# Patient Record
Sex: FEMALE | Race: BLACK OR AFRICAN AMERICAN | Employment: UNEMPLOYED | URBAN - METROPOLITAN AREA
[De-identification: names, ages, dates, MRNs, and addresses within clinical notes are randomized per-mention and may not be internally consistent; named-entity substitution may affect disease eponyms.]

---

## 2018-04-25 ENCOUNTER — APPOINTMENT (OUTPATIENT)
Dept: GENERAL RADIOLOGY | Age: 63
End: 2018-04-25
Payer: MEDICARE

## 2018-04-25 ENCOUNTER — HOSPITAL ENCOUNTER (EMERGENCY)
Age: 63
Discharge: HOME | End: 2018-04-25
Attending: EMERGENCY MEDICINE
Payer: MEDICARE

## 2018-04-25 ENCOUNTER — APPOINTMENT (OUTPATIENT)
Dept: CT IMAGING | Age: 63
End: 2018-04-25
Payer: MEDICARE

## 2018-04-25 VITALS
OXYGEN SATURATION: 98 % | TEMPERATURE: 97.8 F | WEIGHT: 110 LBS | HEART RATE: 90 BPM | HEIGHT: 64 IN | RESPIRATION RATE: 18 BRPM | BODY MASS INDEX: 18.78 KG/M2

## 2018-04-25 DIAGNOSIS — R51.9 NONINTRACTABLE HEADACHE, UNSPECIFIED CHRONICITY PATTERN, UNSPECIFIED HEADACHE TYPE: Primary | ICD-10-CM

## 2018-04-25 LAB
ABSOLUTE EOS #: 0.2 K/UL (ref 0–0.4)
ABSOLUTE IMMATURE GRANULOCYTE: ABNORMAL K/UL (ref 0–0.3)
ABSOLUTE LYMPH #: 1.6 K/UL (ref 1–4.8)
ABSOLUTE MONO #: 0.5 K/UL (ref 0.2–0.8)
ANION GAP SERPL CALCULATED.3IONS-SCNC: 14 MMOL/L (ref 9–17)
BASOPHILS # BLD: 0 % (ref 0–2)
BASOPHILS ABSOLUTE: 0 K/UL (ref 0–0.2)
BUN BLDV-MCNC: 23 MG/DL (ref 8–23)
BUN/CREAT BLD: 31 (ref 9–20)
CALCIUM SERPL-MCNC: 9.7 MG/DL (ref 8.6–10.4)
CHLORIDE BLD-SCNC: 98 MMOL/L (ref 98–107)
CO2: 26 MMOL/L (ref 20–31)
CREAT SERPL-MCNC: 0.75 MG/DL (ref 0.5–0.9)
DIFFERENTIAL TYPE: ABNORMAL
EKG ATRIAL RATE: 75 BPM
EKG P AXIS: 9 DEGREES
EKG P-R INTERVAL: 150 MS
EKG Q-T INTERVAL: 386 MS
EKG QRS DURATION: 84 MS
EKG QTC CALCULATION (BAZETT): 431 MS
EKG R AXIS: -42 DEGREES
EKG T AXIS: 28 DEGREES
EKG VENTRICULAR RATE: 75 BPM
EOSINOPHILS RELATIVE PERCENT: 3 % (ref 1–4)
GFR AFRICAN AMERICAN: >60 ML/MIN
GFR NON-AFRICAN AMERICAN: >60 ML/MIN
GFR SERPL CREATININE-BSD FRML MDRD: ABNORMAL ML/MIN/{1.73_M2}
GFR SERPL CREATININE-BSD FRML MDRD: ABNORMAL ML/MIN/{1.73_M2}
GLUCOSE BLD-MCNC: 188 MG/DL (ref 70–99)
HCT VFR BLD CALC: 39.7 % (ref 36–46)
HEMOGLOBIN: 13.3 G/DL (ref 12–16)
IMMATURE GRANULOCYTES: ABNORMAL %
INR BLD: 0.9
LYMPHOCYTES # BLD: 25 % (ref 24–44)
MCH RBC QN AUTO: 32.4 PG (ref 26–34)
MCHC RBC AUTO-ENTMCNC: 33.6 G/DL (ref 31–37)
MCV RBC AUTO: 96.4 FL (ref 80–100)
MONOCYTES # BLD: 8 % (ref 1–7)
MYOGLOBIN: <21 NG/ML (ref 25–58)
NRBC AUTOMATED: ABNORMAL PER 100 WBC
PARTIAL THROMBOPLASTIN TIME: 23.7 SEC (ref 23–31)
PDW BLD-RTO: 13.4 % (ref 11.5–14.5)
PLATELET # BLD: 247 K/UL (ref 130–400)
PLATELET ESTIMATE: ABNORMAL
PMV BLD AUTO: 8.6 FL (ref 6–12)
POTASSIUM SERPL-SCNC: 4 MMOL/L (ref 3.7–5.3)
PROTHROMBIN TIME: 9 SEC (ref 9.7–11.6)
RBC # BLD: 4.11 M/UL (ref 4–5.2)
RBC # BLD: ABNORMAL 10*6/UL
SEG NEUTROPHILS: 64 % (ref 36–66)
SEGMENTED NEUTROPHILS ABSOLUTE COUNT: 3.9 K/UL (ref 1.8–7.7)
SODIUM BLD-SCNC: 138 MMOL/L (ref 135–144)
TROPONIN INTERP: ABNORMAL
TROPONIN T: <0.03 NG/ML
WBC # BLD: 6.2 K/UL (ref 3.5–11)
WBC # BLD: ABNORMAL 10*3/UL

## 2018-04-25 PROCEDURE — 85730 THROMBOPLASTIN TIME PARTIAL: CPT

## 2018-04-25 PROCEDURE — 93005 ELECTROCARDIOGRAM TRACING: CPT

## 2018-04-25 PROCEDURE — 96374 THER/PROPH/DIAG INJ IV PUSH: CPT

## 2018-04-25 PROCEDURE — 84484 ASSAY OF TROPONIN QUANT: CPT

## 2018-04-25 PROCEDURE — 99284 EMERGENCY DEPT VISIT MOD MDM: CPT

## 2018-04-25 PROCEDURE — 70450 CT HEAD/BRAIN W/O DYE: CPT

## 2018-04-25 PROCEDURE — 83874 ASSAY OF MYOGLOBIN: CPT

## 2018-04-25 PROCEDURE — 6360000002 HC RX W HCPCS: Performed by: NURSE PRACTITIONER

## 2018-04-25 PROCEDURE — 80048 BASIC METABOLIC PNL TOTAL CA: CPT

## 2018-04-25 PROCEDURE — 85025 COMPLETE CBC W/AUTO DIFF WBC: CPT

## 2018-04-25 PROCEDURE — 85610 PROTHROMBIN TIME: CPT

## 2018-04-25 PROCEDURE — 71045 X-RAY EXAM CHEST 1 VIEW: CPT

## 2018-04-25 RX ORDER — BUTALBITAL, ACETAMINOPHEN AND CAFFEINE 300; 40; 50 MG/1; MG/1; MG/1
1 CAPSULE ORAL EVERY 4 HOURS PRN
Qty: 20 CAPSULE | Refills: 0 | Status: SHIPPED | OUTPATIENT
Start: 2018-04-25 | End: 2021-06-29

## 2018-04-25 RX ORDER — LISINOPRIL AND HYDROCHLOROTHIAZIDE 20; 12.5 MG/1; MG/1
1 TABLET ORAL DAILY
COMMUNITY

## 2018-04-25 RX ADMIN — Medication 1 MG: at 16:02

## 2018-04-25 ASSESSMENT — ENCOUNTER SYMPTOMS
VOMITING: 0
SHORTNESS OF BREATH: 0
NAUSEA: 0
PHOTOPHOBIA: 0
DIARRHEA: 0
ABDOMINAL PAIN: 0
COUGH: 0
BACK PAIN: 0

## 2018-04-25 ASSESSMENT — PAIN DESCRIPTION - LOCATION
LOCATION: HEAD
LOCATION: HEAD

## 2018-04-25 ASSESSMENT — PAIN SCALES - GENERAL
PAINLEVEL_OUTOF10: 6
PAINLEVEL_OUTOF10: 3
PAINLEVEL_OUTOF10: 6

## 2018-04-25 ASSESSMENT — PAIN DESCRIPTION - PAIN TYPE: TYPE: ACUTE PAIN

## 2018-04-25 ASSESSMENT — PAIN DESCRIPTION - DESCRIPTORS: DESCRIPTORS: POUNDING;PRESSURE

## 2020-09-16 ENCOUNTER — HOSPITAL ENCOUNTER (INPATIENT)
Age: 65
LOS: 7 days | Discharge: HOME | DRG: 065 | End: 2020-09-23
Attending: EMERGENCY MEDICINE | Admitting: PSYCHIATRY & NEUROLOGY
Payer: COMMERCIAL

## 2020-09-16 ENCOUNTER — APPOINTMENT (OUTPATIENT)
Dept: GENERAL RADIOLOGY | Age: 65
DRG: 065 | End: 2020-09-16
Payer: COMMERCIAL

## 2020-09-16 ENCOUNTER — APPOINTMENT (OUTPATIENT)
Dept: CT IMAGING | Age: 65
DRG: 065 | End: 2020-09-16
Payer: COMMERCIAL

## 2020-09-16 PROBLEM — I63.9 STROKE DETERMINED BY CLINICAL ASSESSMENT (HCC): Status: ACTIVE | Noted: 2020-09-16

## 2020-09-16 LAB
% CKMB: 3.7 % (ref 0–3)
-: NORMAL
ABSOLUTE EOS #: 0.18 K/UL (ref 0–0.44)
ABSOLUTE IMMATURE GRANULOCYTE: 0.07 K/UL (ref 0–0.3)
ABSOLUTE LYMPH #: 2.16 K/UL (ref 1.1–3.7)
ABSOLUTE MONO #: 0.54 K/UL (ref 0.1–1.2)
ANION GAP SERPL CALCULATED.3IONS-SCNC: 14 MMOL/L (ref 9–17)
BASOPHILS # BLD: 0 % (ref 0–2)
BASOPHILS ABSOLUTE: 0.03 K/UL (ref 0–0.2)
BUN BLDV-MCNC: 21 MG/DL (ref 8–23)
BUN/CREAT BLD: ABNORMAL (ref 9–20)
CALCIUM SERPL-MCNC: 9.5 MG/DL (ref 8.6–10.4)
CHLORIDE BLD-SCNC: 97 MMOL/L (ref 98–107)
CK MB: 2.3 NG/ML
CKMB INTERPRETATION: ABNORMAL
CO2: 22 MMOL/L (ref 20–31)
CREAT SERPL-MCNC: 0.75 MG/DL (ref 0.5–0.9)
DIFFERENTIAL TYPE: ABNORMAL
EOSINOPHILS RELATIVE PERCENT: 2 % (ref 1–4)
GFR AFRICAN AMERICAN: >60 ML/MIN
GFR NON-AFRICAN AMERICAN: >60 ML/MIN
GFR SERPL CREATININE-BSD FRML MDRD: ABNORMAL ML/MIN/{1.73_M2}
GFR SERPL CREATININE-BSD FRML MDRD: ABNORMAL ML/MIN/{1.73_M2}
GLUCOSE BLD-MCNC: 431 MG/DL (ref 70–99)
HCT VFR BLD CALC: 41.5 % (ref 36.3–47.1)
HEMOGLOBIN: 13.8 G/DL (ref 11.9–15.1)
IMMATURE GRANULOCYTES: 1 %
INR BLD: 0.9
INR BLD: ABNORMAL
LYMPHOCYTES # BLD: 25 % (ref 24–43)
MCH RBC QN AUTO: 31 PG (ref 25.2–33.5)
MCHC RBC AUTO-ENTMCNC: 33.3 G/DL (ref 28.4–34.8)
MCV RBC AUTO: 93.3 FL (ref 82.6–102.9)
MONOCYTES # BLD: 6 % (ref 3–12)
MYOGLOBIN: <21 NG/ML (ref 25–58)
NRBC AUTOMATED: 0 PER 100 WBC
PARTIAL THROMBOPLASTIN TIME: 19.7 SEC (ref 20.5–30.5)
PARTIAL THROMBOPLASTIN TIME: ABNORMAL SEC
PDW BLD-RTO: 12 % (ref 11.8–14.4)
PLATELET # BLD: ABNORMAL K/UL (ref 138–453)
PLATELET ESTIMATE: ABNORMAL
PLATELET, FLUORESCENCE: NORMAL K/UL (ref 138–453)
PLATELET, IMMATURE FRACTION: NORMAL % (ref 1.1–10.3)
PMV BLD AUTO: ABNORMAL FL (ref 8.1–13.5)
POTASSIUM SERPL-SCNC: 4.1 MMOL/L (ref 3.7–5.3)
PROTHROMBIN TIME: 9.4 SEC (ref 9–12)
PROTHROMBIN TIME: ABNORMAL SEC
RBC # BLD: 4.45 M/UL (ref 3.95–5.11)
RBC # BLD: ABNORMAL 10*6/UL
REASON FOR REJECTION: NORMAL
SEG NEUTROPHILS: 66 % (ref 36–65)
SEGMENTED NEUTROPHILS ABSOLUTE COUNT: 5.73 K/UL (ref 1.5–8.1)
SODIUM BLD-SCNC: 133 MMOL/L (ref 135–144)
TOTAL CK: 63 U/L (ref 26–192)
TROPONIN INTERP: ABNORMAL
TROPONIN T: ABNORMAL NG/ML
TROPONIN, HIGH SENSITIVITY: 6 NG/L (ref 0–14)
WBC # BLD: 8.7 K/UL (ref 3.5–11.3)
WBC # BLD: ABNORMAL 10*3/UL
ZZ NTE CLEAN UP: ORDERED TEST: NORMAL
ZZ NTE WITH NAME CLEAN UP: SPECIMEN SOURCE: NORMAL

## 2020-09-16 PROCEDURE — 80048 BASIC METABOLIC PNL TOTAL CA: CPT

## 2020-09-16 PROCEDURE — 2060000000 HC ICU INTERMEDIATE R&B

## 2020-09-16 PROCEDURE — 83874 ASSAY OF MYOGLOBIN: CPT

## 2020-09-16 PROCEDURE — 70496 CT ANGIOGRAPHY HEAD: CPT

## 2020-09-16 PROCEDURE — 85730 THROMBOPLASTIN TIME PARTIAL: CPT

## 2020-09-16 PROCEDURE — 82435 ASSAY OF BLOOD CHLORIDE: CPT

## 2020-09-16 PROCEDURE — 82553 CREATINE MB FRACTION: CPT

## 2020-09-16 PROCEDURE — 6370000000 HC RX 637 (ALT 250 FOR IP): Performed by: STUDENT IN AN ORGANIZED HEALTH CARE EDUCATION/TRAINING PROGRAM

## 2020-09-16 PROCEDURE — 82803 BLOOD GASES ANY COMBINATION: CPT

## 2020-09-16 PROCEDURE — 85055 RETICULATED PLATELET ASSAY: CPT

## 2020-09-16 PROCEDURE — 84295 ASSAY OF SERUM SODIUM: CPT

## 2020-09-16 PROCEDURE — 82330 ASSAY OF CALCIUM: CPT

## 2020-09-16 PROCEDURE — 82550 ASSAY OF CK (CPK): CPT

## 2020-09-16 PROCEDURE — 84132 ASSAY OF SERUM POTASSIUM: CPT

## 2020-09-16 PROCEDURE — 2580000003 HC RX 258: Performed by: STUDENT IN AN ORGANIZED HEALTH CARE EDUCATION/TRAINING PROGRAM

## 2020-09-16 PROCEDURE — 85014 HEMATOCRIT: CPT

## 2020-09-16 PROCEDURE — 70450 CT HEAD/BRAIN W/O DYE: CPT

## 2020-09-16 PROCEDURE — 84484 ASSAY OF TROPONIN QUANT: CPT

## 2020-09-16 PROCEDURE — 93005 ELECTROCARDIOGRAM TRACING: CPT | Performed by: STUDENT IN AN ORGANIZED HEALTH CARE EDUCATION/TRAINING PROGRAM

## 2020-09-16 PROCEDURE — 82565 ASSAY OF CREATININE: CPT

## 2020-09-16 PROCEDURE — 71045 X-RAY EXAM CHEST 1 VIEW: CPT

## 2020-09-16 PROCEDURE — 6360000004 HC RX CONTRAST MEDICATION: Performed by: STUDENT IN AN ORGANIZED HEALTH CARE EDUCATION/TRAINING PROGRAM

## 2020-09-16 PROCEDURE — 99222 1ST HOSP IP/OBS MODERATE 55: CPT | Performed by: PSYCHIATRY & NEUROLOGY

## 2020-09-16 PROCEDURE — 85610 PROTHROMBIN TIME: CPT

## 2020-09-16 PROCEDURE — 6360000002 HC RX W HCPCS: Performed by: STUDENT IN AN ORGANIZED HEALTH CARE EDUCATION/TRAINING PROGRAM

## 2020-09-16 PROCEDURE — 85027 COMPLETE CBC AUTOMATED: CPT

## 2020-09-16 PROCEDURE — 85025 COMPLETE CBC W/AUTO DIFF WBC: CPT

## 2020-09-16 PROCEDURE — 99285 EMERGENCY DEPT VISIT HI MDM: CPT

## 2020-09-16 PROCEDURE — 83605 ASSAY OF LACTIC ACID: CPT

## 2020-09-16 RX ORDER — ACETAMINOPHEN 500 MG
1000 TABLET ORAL ONCE
Status: COMPLETED | OUTPATIENT
Start: 2020-09-16 | End: 2020-09-16

## 2020-09-16 RX ORDER — HEPARIN SODIUM 1000 [USP'U]/ML
60 INJECTION, SOLUTION INTRAVENOUS; SUBCUTANEOUS PRN
Status: DISCONTINUED | OUTPATIENT
Start: 2020-09-16 | End: 2020-09-17

## 2020-09-16 RX ORDER — 0.9 % SODIUM CHLORIDE 0.9 %
1000 INTRAVENOUS SOLUTION INTRAVENOUS ONCE
Status: COMPLETED | OUTPATIENT
Start: 2020-09-16 | End: 2020-09-16

## 2020-09-16 RX ORDER — HEPARIN SODIUM 1000 [USP'U]/ML
60 INJECTION, SOLUTION INTRAVENOUS; SUBCUTANEOUS ONCE
Status: COMPLETED | OUTPATIENT
Start: 2020-09-16 | End: 2020-09-16

## 2020-09-16 RX ORDER — HEPARIN SODIUM 1000 [USP'U]/ML
30 INJECTION, SOLUTION INTRAVENOUS; SUBCUTANEOUS PRN
Status: DISCONTINUED | OUTPATIENT
Start: 2020-09-16 | End: 2020-09-17

## 2020-09-16 RX ORDER — CLOPIDOGREL 300 MG/1
300 TABLET, FILM COATED ORAL ONCE
Status: COMPLETED | OUTPATIENT
Start: 2020-09-16 | End: 2020-09-16

## 2020-09-16 RX ORDER — HEPARIN SODIUM 10000 [USP'U]/100ML
12 INJECTION, SOLUTION INTRAVENOUS CONTINUOUS
Status: DISCONTINUED | OUTPATIENT
Start: 2020-09-16 | End: 2020-09-23

## 2020-09-16 RX ADMIN — IOHEXOL 90 ML: 350 INJECTION, SOLUTION INTRAVENOUS at 21:22

## 2020-09-16 RX ADMIN — HEPARIN SODIUM 2990 UNITS: 1000 INJECTION INTRAVENOUS; SUBCUTANEOUS at 23:23

## 2020-09-16 RX ADMIN — ACETAMINOPHEN 1000 MG: 500 TABLET ORAL at 20:43

## 2020-09-16 RX ADMIN — SODIUM CHLORIDE 1000 ML: 9 INJECTION, SOLUTION INTRAVENOUS at 21:20

## 2020-09-16 RX ADMIN — CLOPIDOGREL BISULFATE 300 MG: 300 TABLET, FILM COATED ORAL at 23:22

## 2020-09-16 RX ADMIN — HEPARIN SODIUM AND DEXTROSE 12 UNITS/KG/HR: 10000; 5 INJECTION INTRAVENOUS at 23:23

## 2020-09-16 ASSESSMENT — PAIN DESCRIPTION - ORIENTATION: ORIENTATION: LEFT

## 2020-09-16 ASSESSMENT — PAIN SCALES - GENERAL
PAINLEVEL_OUTOF10: 5
PAINLEVEL_OUTOF10: 5

## 2020-09-16 ASSESSMENT — PAIN DESCRIPTION - LOCATION: LOCATION: SHOULDER

## 2020-09-17 ENCOUNTER — APPOINTMENT (OUTPATIENT)
Dept: MRI IMAGING | Age: 65
DRG: 065 | End: 2020-09-17
Payer: COMMERCIAL

## 2020-09-17 LAB
ALLEN TEST: ABNORMAL
ANION GAP SERPL CALCULATED.3IONS-SCNC: 12 MMOL/L (ref 9–17)
ANION GAP: 8 MMOL/L (ref 7–16)
BUN BLDV-MCNC: 11 MG/DL (ref 8–23)
BUN/CREAT BLD: ABNORMAL (ref 9–20)
CALCIUM SERPL-MCNC: 8.3 MG/DL (ref 8.6–10.4)
CHLORIDE BLD-SCNC: 108 MMOL/L (ref 98–107)
CHOLESTEROL/HDL RATIO: 2.3
CHOLESTEROL: 162 MG/DL
CO2: 19 MMOL/L (ref 20–31)
CREAT SERPL-MCNC: 0.46 MG/DL (ref 0.5–0.9)
ESTIMATED AVERAGE GLUCOSE: 303 MG/DL
FIO2: ABNORMAL
GFR AFRICAN AMERICAN: >60 ML/MIN
GFR NON-AFRICAN AMERICAN: >60 ML/MIN
GFR NON-AFRICAN AMERICAN: >60 ML/MIN
GFR SERPL CREATININE-BSD FRML MDRD: >60 ML/MIN
GFR SERPL CREATININE-BSD FRML MDRD: ABNORMAL ML/MIN/{1.73_M2}
GFR SERPL CREATININE-BSD FRML MDRD: ABNORMAL ML/MIN/{1.73_M2}
GFR SERPL CREATININE-BSD FRML MDRD: NORMAL ML/MIN/{1.73_M2}
GLUCOSE BLD-MCNC: 165 MG/DL (ref 70–99)
GLUCOSE BLD-MCNC: 171 MG/DL (ref 65–105)
GLUCOSE BLD-MCNC: 178 MG/DL (ref 65–105)
GLUCOSE BLD-MCNC: 192 MG/DL (ref 65–105)
GLUCOSE BLD-MCNC: 199 MG/DL (ref 65–105)
GLUCOSE BLD-MCNC: 464 MG/DL (ref 74–100)
HBA1C MFR BLD: 12.2 % (ref 4–6)
HCO3 VENOUS: 25.7 MMOL/L (ref 22–29)
HCT VFR BLD CALC: 37.1 % (ref 36.3–47.1)
HCT VFR BLD CALC: 39.4 % (ref 36.3–47.1)
HDLC SERPL-MCNC: 69 MG/DL
HEMOGLOBIN: 12.1 G/DL (ref 11.9–15.1)
HEMOGLOBIN: 12.9 G/DL (ref 11.9–15.1)
LDL CHOLESTEROL: 76 MG/DL (ref 0–130)
LV EF: 50 %
LVEF MODALITY: NORMAL
MCH RBC QN AUTO: 31.5 PG (ref 25.2–33.5)
MCH RBC QN AUTO: 31.8 PG (ref 25.2–33.5)
MCHC RBC AUTO-ENTMCNC: 32.6 G/DL (ref 28.4–34.8)
MCHC RBC AUTO-ENTMCNC: 32.7 G/DL (ref 28.4–34.8)
MCV RBC AUTO: 96.1 FL (ref 82.6–102.9)
MCV RBC AUTO: 97.6 FL (ref 82.6–102.9)
MODE: ABNORMAL
NEGATIVE BASE EXCESS, VEN: ABNORMAL (ref 0–2)
NRBC AUTOMATED: 0 PER 100 WBC
NRBC AUTOMATED: 0 PER 100 WBC
O2 DEVICE/FLOW/%: ABNORMAL
O2 SAT, VEN: 91 % (ref 60–85)
PARTIAL THROMBOPLASTIN TIME: 23.5 SEC (ref 20.5–30.5)
PARTIAL THROMBOPLASTIN TIME: 30 SEC (ref 20.5–30.5)
PARTIAL THROMBOPLASTIN TIME: 36.5 SEC (ref 20.5–30.5)
PARTIAL THROMBOPLASTIN TIME: 47.9 SEC (ref 20.5–30.5)
PATIENT TEMP: ABNORMAL
PCO2, VEN: 41.5 MM HG (ref 41–51)
PDW BLD-RTO: 12.1 % (ref 11.8–14.4)
PDW BLD-RTO: 12.1 % (ref 11.8–14.4)
PH VENOUS: 7.4 (ref 7.32–7.43)
PLATELET # BLD: 271 K/UL (ref 138–453)
PLATELET # BLD: 286 K/UL (ref 138–453)
PMV BLD AUTO: 10.5 FL (ref 8.1–13.5)
PMV BLD AUTO: 9.9 FL (ref 8.1–13.5)
PO2, VEN: 62.1 MM HG (ref 30–50)
POC CHLORIDE: 99 MMOL/L (ref 98–107)
POC CREATININE: 0.58 MG/DL (ref 0.51–1.19)
POC HEMATOCRIT: 44 % (ref 36–46)
POC HEMOGLOBIN: 15.1 G/DL (ref 12–16)
POC IONIZED CALCIUM: 1.18 MMOL/L (ref 1.15–1.33)
POC LACTIC ACID: 1.15 MMOL/L (ref 0.56–1.39)
POC PCO2 TEMP: ABNORMAL MM HG
POC PH TEMP: ABNORMAL
POC PO2 TEMP: ABNORMAL MM HG
POC POTASSIUM: 5.5 MMOL/L (ref 3.5–4.5)
POC SODIUM: 133 MMOL/L (ref 138–146)
POSITIVE BASE EXCESS, VEN: 1 (ref 0–3)
POTASSIUM SERPL-SCNC: 3.7 MMOL/L (ref 3.7–5.3)
RBC # BLD: 3.8 M/UL (ref 3.95–5.11)
RBC # BLD: 4.1 M/UL (ref 3.95–5.11)
SAMPLE SITE: ABNORMAL
SARS-COV-2, RAPID: NORMAL
SARS-COV-2: NORMAL
SARS-COV-2: NOT DETECTED
SODIUM BLD-SCNC: 139 MMOL/L (ref 135–144)
SOURCE: NORMAL
TOTAL CO2, VENOUS: 27 MMOL/L (ref 23–30)
TRIGL SERPL-MCNC: 85 MG/DL
VLDLC SERPL CALC-MCNC: NORMAL MG/DL (ref 1–30)
WBC # BLD: 8.2 K/UL (ref 3.5–11.3)
WBC # BLD: 9 K/UL (ref 3.5–11.3)

## 2020-09-17 PROCEDURE — 6370000000 HC RX 637 (ALT 250 FOR IP): Performed by: NURSE PRACTITIONER

## 2020-09-17 PROCEDURE — 36415 COLL VENOUS BLD VENIPUNCTURE: CPT

## 2020-09-17 PROCEDURE — 92523 SPEECH SOUND LANG COMPREHEN: CPT

## 2020-09-17 PROCEDURE — 2000000003 HC NEURO ICU R&B

## 2020-09-17 PROCEDURE — 93306 TTE W/DOPPLER COMPLETE: CPT

## 2020-09-17 PROCEDURE — 80048 BASIC METABOLIC PNL TOTAL CA: CPT

## 2020-09-17 PROCEDURE — 97535 SELF CARE MNGMENT TRAINING: CPT

## 2020-09-17 PROCEDURE — 99233 SBSQ HOSP IP/OBS HIGH 50: CPT | Performed by: PSYCHIATRY & NEUROLOGY

## 2020-09-17 PROCEDURE — 85730 THROMBOPLASTIN TIME PARTIAL: CPT

## 2020-09-17 PROCEDURE — 80061 LIPID PANEL: CPT

## 2020-09-17 PROCEDURE — 2580000003 HC RX 258: Performed by: STUDENT IN AN ORGANIZED HEALTH CARE EDUCATION/TRAINING PROGRAM

## 2020-09-17 PROCEDURE — U0003 INFECTIOUS AGENT DETECTION BY NUCLEIC ACID (DNA OR RNA); SEVERE ACUTE RESPIRATORY SYNDROME CORONAVIRUS 2 (SARS-COV-2) (CORONAVIRUS DISEASE [COVID-19]), AMPLIFIED PROBE TECHNIQUE, MAKING USE OF HIGH THROUGHPUT TECHNOLOGIES AS DESCRIBED BY CMS-2020-01-R: HCPCS

## 2020-09-17 PROCEDURE — 6370000000 HC RX 637 (ALT 250 FOR IP): Performed by: STUDENT IN AN ORGANIZED HEALTH CARE EDUCATION/TRAINING PROGRAM

## 2020-09-17 PROCEDURE — 97166 OT EVAL MOD COMPLEX 45 MIN: CPT

## 2020-09-17 PROCEDURE — APPNB45 APP NON BILLABLE 31-45 MINUTES: Performed by: NURSE PRACTITIONER

## 2020-09-17 PROCEDURE — 85027 COMPLETE CBC AUTOMATED: CPT

## 2020-09-17 PROCEDURE — 70551 MRI BRAIN STEM W/O DYE: CPT

## 2020-09-17 PROCEDURE — 83036 HEMOGLOBIN GLYCOSYLATED A1C: CPT

## 2020-09-17 PROCEDURE — 82947 ASSAY GLUCOSE BLOOD QUANT: CPT

## 2020-09-17 RX ORDER — ASPIRIN 81 MG/1
81 TABLET ORAL DAILY
Status: DISCONTINUED | OUTPATIENT
Start: 2020-09-17 | End: 2020-09-23 | Stop reason: HOSPADM

## 2020-09-17 RX ORDER — BUTALBITAL, ACETAMINOPHEN AND CAFFEINE 50; 325; 40 MG/1; MG/1; MG/1
1 TABLET ORAL EVERY 4 HOURS PRN
Status: DISCONTINUED | OUTPATIENT
Start: 2020-09-17 | End: 2020-09-23 | Stop reason: HOSPADM

## 2020-09-17 RX ORDER — ATORVASTATIN CALCIUM 80 MG/1
80 TABLET, FILM COATED ORAL NIGHTLY
Status: DISCONTINUED | OUTPATIENT
Start: 2020-09-17 | End: 2020-09-23 | Stop reason: HOSPADM

## 2020-09-17 RX ORDER — SODIUM CHLORIDE 0.9 % (FLUSH) 0.9 %
10 SYRINGE (ML) INJECTION EVERY 12 HOURS SCHEDULED
Status: DISCONTINUED | OUTPATIENT
Start: 2020-09-17 | End: 2020-09-23 | Stop reason: HOSPADM

## 2020-09-17 RX ORDER — NICOTINE POLACRILEX 4 MG
15 LOZENGE BUCCAL PRN
Status: DISCONTINUED | OUTPATIENT
Start: 2020-09-17 | End: 2020-09-23 | Stop reason: HOSPADM

## 2020-09-17 RX ORDER — SODIUM CHLORIDE 0.9 % (FLUSH) 0.9 %
10 SYRINGE (ML) INJECTION PRN
Status: DISCONTINUED | OUTPATIENT
Start: 2020-09-17 | End: 2020-09-23 | Stop reason: HOSPADM

## 2020-09-17 RX ORDER — LISINOPRIL AND HYDROCHLOROTHIAZIDE 20; 12.5 MG/1; MG/1
1 TABLET ORAL DAILY
Status: DISCONTINUED | OUTPATIENT
Start: 2020-09-17 | End: 2020-09-23 | Stop reason: HOSPADM

## 2020-09-17 RX ORDER — POTASSIUM CHLORIDE 7.45 MG/ML
10 INJECTION INTRAVENOUS PRN
Status: DISCONTINUED | OUTPATIENT
Start: 2020-09-17 | End: 2020-09-23 | Stop reason: HOSPADM

## 2020-09-17 RX ORDER — PROMETHAZINE HYDROCHLORIDE 12.5 MG/1
12.5 TABLET ORAL EVERY 6 HOURS PRN
Status: DISCONTINUED | OUTPATIENT
Start: 2020-09-17 | End: 2020-09-23 | Stop reason: HOSPADM

## 2020-09-17 RX ORDER — DEXTROSE MONOHYDRATE 50 MG/ML
100 INJECTION, SOLUTION INTRAVENOUS PRN
Status: DISCONTINUED | OUTPATIENT
Start: 2020-09-17 | End: 2020-09-23 | Stop reason: HOSPADM

## 2020-09-17 RX ORDER — ONDANSETRON 2 MG/ML
4 INJECTION INTRAMUSCULAR; INTRAVENOUS EVERY 6 HOURS PRN
Status: DISCONTINUED | OUTPATIENT
Start: 2020-09-17 | End: 2020-09-23 | Stop reason: HOSPADM

## 2020-09-17 RX ORDER — SODIUM CHLORIDE 9 MG/ML
INJECTION, SOLUTION INTRAVENOUS CONTINUOUS
Status: DISCONTINUED | OUTPATIENT
Start: 2020-09-17 | End: 2020-09-20

## 2020-09-17 RX ORDER — METOPROLOL TARTRATE 50 MG/1
50 TABLET, FILM COATED ORAL DAILY
Status: DISCONTINUED | OUTPATIENT
Start: 2020-09-17 | End: 2020-09-23 | Stop reason: HOSPADM

## 2020-09-17 RX ORDER — M-VIT,TX,IRON,MINS/CALC/FOLIC 27MG-0.4MG
1 TABLET ORAL DAILY
Status: DISCONTINUED | OUTPATIENT
Start: 2020-09-17 | End: 2020-09-23 | Stop reason: HOSPADM

## 2020-09-17 RX ORDER — POLYETHYLENE GLYCOL 3350 17 G/17G
17 POWDER, FOR SOLUTION ORAL DAILY PRN
Status: DISCONTINUED | OUTPATIENT
Start: 2020-09-17 | End: 2020-09-23 | Stop reason: HOSPADM

## 2020-09-17 RX ORDER — FOLIC ACID 1 MG/1
1 TABLET ORAL DAILY
Status: DISCONTINUED | OUTPATIENT
Start: 2020-09-17 | End: 2020-09-23 | Stop reason: HOSPADM

## 2020-09-17 RX ORDER — CLOPIDOGREL BISULFATE 75 MG/1
75 TABLET ORAL DAILY
Status: DISCONTINUED | OUTPATIENT
Start: 2020-09-17 | End: 2020-09-23 | Stop reason: HOSPADM

## 2020-09-17 RX ORDER — DEXTROSE MONOHYDRATE 25 G/50ML
12.5 INJECTION, SOLUTION INTRAVENOUS PRN
Status: DISCONTINUED | OUTPATIENT
Start: 2020-09-17 | End: 2020-09-23 | Stop reason: HOSPADM

## 2020-09-17 RX ORDER — POTASSIUM CHLORIDE 20 MEQ/1
40 TABLET, EXTENDED RELEASE ORAL PRN
Status: DISCONTINUED | OUTPATIENT
Start: 2020-09-17 | End: 2020-09-23 | Stop reason: HOSPADM

## 2020-09-17 RX ORDER — CITALOPRAM 20 MG/1
20 TABLET ORAL DAILY
Status: DISCONTINUED | OUTPATIENT
Start: 2020-09-17 | End: 2020-09-23 | Stop reason: HOSPADM

## 2020-09-17 RX ADMIN — BUTALBITAL, ACETAMINOPHEN AND CAFFEINE 1 TABLET: 50; 325; 40 TABLET ORAL at 10:48

## 2020-09-17 RX ADMIN — SODIUM CHLORIDE, PRESERVATIVE FREE 10 ML: 5 INJECTION INTRAVENOUS at 21:31

## 2020-09-17 RX ADMIN — CITALOPRAM HYDROBROMIDE 20 MG: 20 TABLET ORAL at 10:49

## 2020-09-17 RX ADMIN — INSULIN LISPRO 3 UNITS: 100 INJECTION, SOLUTION INTRAVENOUS; SUBCUTANEOUS at 18:18

## 2020-09-17 RX ADMIN — MULTIPLE VITAMINS W/ MINERALS TAB 1 TABLET: TAB at 10:49

## 2020-09-17 RX ADMIN — SODIUM CHLORIDE: 9 INJECTION, SOLUTION INTRAVENOUS at 02:00

## 2020-09-17 RX ADMIN — FOLIC ACID 1 MG: 1 TABLET ORAL at 10:49

## 2020-09-17 RX ADMIN — CLOPIDOGREL 75 MG: 75 TABLET, FILM COATED ORAL at 10:49

## 2020-09-17 RX ADMIN — INSULIN LISPRO 3 UNITS: 100 INJECTION, SOLUTION INTRAVENOUS; SUBCUTANEOUS at 13:22

## 2020-09-17 RX ADMIN — SODIUM CHLORIDE, PRESERVATIVE FREE 10 ML: 5 INJECTION INTRAVENOUS at 10:50

## 2020-09-17 RX ADMIN — Medication 81 MG: at 10:49

## 2020-09-17 RX ADMIN — INSULIN LISPRO 2 UNITS: 100 INJECTION, SOLUTION INTRAVENOUS; SUBCUTANEOUS at 21:33

## 2020-09-17 RX ADMIN — ATORVASTATIN CALCIUM 80 MG: 80 TABLET, FILM COATED ORAL at 21:31

## 2020-09-17 ASSESSMENT — PAIN SCALES - GENERAL
PAINLEVEL_OUTOF10: 4
PAINLEVEL_OUTOF10: 3
PAINLEVEL_OUTOF10: 0
PAINLEVEL_OUTOF10: 2

## 2020-09-17 ASSESSMENT — PAIN DESCRIPTION - LOCATION
LOCATION: HEAD
LOCATION: HEAD

## 2020-09-17 ASSESSMENT — ENCOUNTER SYMPTOMS
CHEST TIGHTNESS: 0
ABDOMINAL PAIN: 0
SHORTNESS OF BREATH: 0
EYE REDNESS: 0
EYE DISCHARGE: 0

## 2020-09-17 NOTE — PROGRESS NOTES
Writer found number for cardiology in regards to the loop recorder. Attempt was made to call, but office is closed for lunch until 1pm. Will try again later.

## 2020-09-17 NOTE — ED PROVIDER NOTES
[address]     Emergency Department     Faculty Note/ Attestation      Pt Name: Dwayne Paige                                       MRN: [id number]  [place] 1955  Date of evaluation: 9/16/2020    Patients PCP:    ASH Melissa - [facility]      Attestation  I performed a history and physical examination of the patient and discussed management with the resident. I reviewed the residents note and agree with the documented findings and plan of care. Any areas of disagreement are noted on the chart. I was personally present for the key portions of any procedures. I have documented in the chart those procedures where I was not present during the key portions. I have reviewed the emergency nurses triage note. I agree with the chief complaint, past medical history, past surgical history, allergies, medications, social and family history as documented unless otherwise noted below. For Physician Assistant/ Nurse Practitioner cases/documentation I have personally evaluated this patient and have completed at least one if not all key elements of the E/M (history, physical exam, and MDM). Additional findings are as noted. Initial Screens:  NIH Stroke Scale  Interval: Pre-Treatment  Level of Consciousness (1a. ): Alert  LOC Questions (1b. ):  Answers both correctly  Best Gaze (2. ): Normal  Visual (3. ): No visual loss  Facial Palsy (4. ): Normal symmetrical movement  Motor Arm, Left (5a. ): No drift  Motor Arm, Right (5b. ): Drift, but does not hit bed  Motor Leg, Left (6a. ): No drift  Motor Leg, Right (6b. ): No drift  Limb Ataxia (7. ): Absent  Sensory (8. ): Normal  Best Language (9. ): No aphasia  Dysarthria (10. ): Normal  Extinction and Inattention (11): No abnormality          Vitals:    Vitals:    09/16/20 2024   BP: (!) 141/102   Pulse: 97   Resp: 18   Temp: 98.6 °F (37 °C)   TempSrc: Oral   SpO2: 98%   Weight: 110 lb (49.9 kg)   Height: 4' 11\" (1.499 m)       CHIEF COMPLAINT Chief Complaint   Patient presents with    Cerebrovascular Accident             DIAGNOSTIC RESULTS             RADIOLOGY:   CT Head WO Contrast   Final Result   Age-indeterminate right posterior MCA infarction. No acute intracranial   hemorrhage. Critical results were called by Dr. Alisa Yan MD to [facility]3] on   9/16/2020 at 20:33.          XR CHEST PORTABLE    (Results Pending)   CTA HEAD NECK W CONTRAST    (Results Pending)         LABS:  Labs Reviewed   STROKE PANEL         EMERGENCY DEPARTMENT COURSE:     -------------------------  BP: (!) 141/102, Temp: 98.6 °F (37 °C), Pulse: 97, Resp: 18      Comments    59 yo with prior CVA, mild L sided wkness residual  Today with RUE wkness, L forehead headache    BIBMSU  CT neg, NIH 1  brought in as stroke alert    LKN 1900  Plan for CTA and f/u stroke reccs    (Please note that portions of this note were completed with a voice recognition program.  Efforts were made to edit the dictations but occasionally words are mis-transcribed.)      Barrera MD  Attending Emergency Physician          Corinna Guerrero MD  09/16/20 2040

## 2020-09-17 NOTE — PROGRESS NOTES
limits           Objective:     Oral/Motor  Oral Motor: Within functional limits  Motor Speech  Motor Speech: Within Functional Limits         Cognition:      Orientation  Overall Orientation Status: Within Normal Limits  Attention  Attention: Within Functional Limits  Memory  Memory: Within Funtional Limits  Problem Solving  Problem Solving: Within Functional Limits  Safety/Judgement  Safety/Judgement: Within Functional Limits   Word associations: Within Functional Limits  Sequencing: Within Functional Limits  Word Generation: Within Functional Limits       Prognosis:  Speech Therapy Prognosis  Prognosis: Good  Individuals consulted  Consulted and agree with results and recommendations: Patient    Education:  Patient Education: yes  Patient Education Response: Verbalizes understanding          Therapy Time:   Individual Concurrent Group Co-treatment   Time In 9295         Time Out 1331         Minutes 7               Completed by: Arleth Mejia  Clinician    Cosigned By: Cally Smith A.CCC/SLP    9/17/2020 2:51 PM

## 2020-09-17 NOTE — ED PROVIDER NOTES
Faculty Sign-Out Attestation  Handoff taken on the following patient from prior Attending Physician: Hector Soliman was available and discussed any additional care issues that arose and coordinated the management plans with the resident(s) caring for the patient during my duty period. Any areas of disagreement with residents documentation of care or procedures are noted on the chart. I was personally present for the key portions of any/all procedures during my duty period. I have documented in the chart those procedures where I was not present during the key portions.     Cva, stroke team has admitted,     Rosa Braxton DO  Attending Physician      Rosa Braxton DO  09/16/20 2864

## 2020-09-17 NOTE — CARE COORDINATION
Case Management Initial Discharge Plan  [place],             Met with:patient to discuss discharge plans. Information verified: address, contacts, phone number, , insurance Yes    Emergency Contact/Next of Kin name & number: Trent Eller #1 call, [phone], Daughter Yanelis Marquis [phone]    PCP: Katlyn Goddard, APRN - CNP  Date of last visit: Dr Kelvin Greene one week    Insurance Provider: [facility]    Discharge Planning    Living Arrangements:    Lives by self in duplex  Support Systems:       Home has 2 stories  15 stairs to climb to get into front door, no stairs to climb to reach second floor  Location of bedroom/bathroom in home main    Patient able to perform ADL's:Independent    Current Services (outpatient & in home) none  DME equipment: glucometer  DME provider: na    Receiving oral anticoagulation therapy? No    If indicated: baby asa  Physician managing anticoagulation treatment: na  Where does patient obtain lab work for ATC treatment? na      Potential Assistance Needed:       Patient agreeable to home care: No  [place] of choice provided:  n/a    Prior SNF/Rehab Placement and Facility: none  Agreeable to SNF/Rehab: Yes IP rehab if needed  Freedom of choice provided: will obtain pt.ot notes first     Evaluation: no    Expected Discharge date:       Patient expects to be discharged to: Follow Up Appointment: Best Day/ Time:      Transportation provider: n  Transportation arrangements needed for discharge: No    Readmission Risk              Risk of Unplanned Readmission:        15             Does patient have a readmission risk score greater than 14?: Yes  If yes, follow-up appointment must be made within 7 days of discharge.      Goals of Care: self care      Discharge Plan: monitor PT/OT evals for PMR needs, may need choice, otherwise home independent, will need one week f/u          Electronically signed by Robert Burton RN on 20 at 4:03 PM EDT

## 2020-09-17 NOTE — ED PROVIDER NOTES
[facility]  ED  Emergency Department Encounter  Emergency Medicine Resident     Pt Name: Dwayne Paige  MRN: [id number]  [place] 1955  Date of evaluation: 9/17/20  PCP:  Sandrita Melissa       Chief Complaint   Patient presents with    Cerebrovascular Accident       HISTORY OFPRESENT ILLNESS  (Location/Symptom, Timing/Onset, Context/Setting, Quality, Duration, Modifying Christen Race.)      Dwayne Paige is a 60 y.o. female who presents with numbness and tingling on the right arm and leg. This occurred while she was gardening approximately 7 PM.  Does have a history of stroke with left-sided deficits. She states that vastly improved from her stroke in the past but does notice some slight weakness on the left side from previous stroke. Does endorse some right-sided weakness which is new since 7 PM.  Denies any falls or recent injury. Does also endorse intermittent, moderate, left frontal headache. PAST MEDICAL / SURGICAL / SOCIAL / FAMILY HISTORY      has a past medical history of Breast cancer ([facility] [address] Depression, HTN (hypertension), Hyperlipidemia, Ovarian cyst, bilateral, Pancreatitis, and Renal stone. has a past surgical history that includes Inguinal hernia repair; Appendectomy; lymphadenectomy (Bilateral); Tunneled venous port placement; Breast lumpectomy; and Breast surgery.     Social History     Socioeconomic History    Marital status: Single     Spouse name: Not on file    Number of children: Not on file    Years of education: Not on file    Highest education level: Not on file   Occupational History     Employer: NONE   Social Needs    Financial resource strain: Not on file    Food insecurity     Worry: Not on file     Inability: Not on file    Transportation needs     Medical: Not on file     Non-medical: Not on file   Tobacco Use    Smoking status: Never Smoker    Smokeless tobacco: Never Used   Substance and Sexual Activity    Alcohol use: Yes     Comment: rare, USED TO DRINK 2 BOTTLES OF WINE/WK QUIT LAST FRI    Drug use: No    Sexual activity: Yes     Partners: Male   Lifestyle    Physical activity     Days per week: Not on file     Minutes per session: Not on file    Stress: Not on file   Relationships    Social connections     Talks on phone: Not on file     Gets together: Not on file     Attends Yazdanism service: Not on file     Active member of club or organization: Not on file     Attends meetings of clubs or organizations: Not on file     Relationship status: Not on file    Intimate partner violence     Fear of current or ex partner: Not on file     Emotionally abused: Not on file     Physically abused: Not on file     Forced sexual activity: Not on file   Other Topics Concern    Not on file   Social History Narrative    ** Merged History Encounter **            Family History   Problem Relation Age of Onset    Coronary Art Dis Mother     Heart Disease Mother     Coronary Art Dis Father     Diabetes Father     High Blood Pressure Father     Coronary Art Dis Sister        Allergies:  Compazine [prochlorperazine maleate]; Prochlorperazine edisylate; Reglan [metoclopramide]; and Vicodin [hydrocodone-acetaminophen]    Home Medications:  Prior to Admission medications    Medication Sig Start Date End Date Taking? Authorizing Provider   lisinopril-hydrochlorothiazide (PRINZIDE;ZESTORETIC) 20-12.5 MG per tablet Take 1 tablet by mouth daily    Historical Provider, MD   aspirin 81 MG tablet Take 81 mg by mouth daily    Historical Provider, MD   butalbital-APAP-caffeine (FIORICET) -40 MG CAPS per capsule Take 1 capsule by mouth every 4 hours as needed for Headaches 4/25/18   Kendy Oden, APRN - CNP   metoprolol (LOPRESSOR) 50 MG tablet Take 1 tablet by mouth daily. 12/12/14   Cornelia Henderson MD   Multiple Vitamin (MULTIVITAMIN) tablet Take 1 tablet by mouth daily.  12/12/14   Cornelia Henderson MD citalopram (CELEXA) 20 MG tablet Take 1 tablet by mouth daily. 12/12/14   Anuja Roper MD   Blood Pressure KIT Check blood pressure daily. 3/13/14   Shawanda Spencer MD       REVIEW OF SYSTEMS    (2-9 systems for level 4, 10 or more for level 5)      Review of Systems   Constitutional: Negative for chills and fever. Eyes: Negative for discharge and redness. Respiratory: Negative for chest tightness and shortness of breath. Cardiovascular: Negative for chest pain. Gastrointestinal: Negative for abdominal pain. Genitourinary: Negative for dysuria and flank pain. Musculoskeletal: Negative for myalgias. Skin: Negative for rash. Allergic/Immunologic: Positive for environmental allergies. Neurological: Positive for weakness, numbness and headaches. Psychiatric/Behavioral: Negative for agitation and confusion. PHYSICAL EXAM   (up to 7 for level 4, 8 or more for level 5)     INITIAL VITALS:    height is 4' 11\" (1.499 m) and weight is 110 lb (49.9 kg). Her oral temperature is 97.9 °F (36.6 °C). Her blood pressure is 148/84 (abnormal) and her pulse is 75. Her respiration is 11 and oxygen saturation is 97%. Physical Exam  Vitals signs and nursing note reviewed. Constitutional:       Appearance: She is well-developed. HENT:      Head: Normocephalic and atraumatic. Nose: Nose normal.      Mouth/Throat:      Mouth: Mucous membranes are moist.   Eyes:      General: No scleral icterus. Conjunctiva/sclera: Conjunctivae normal.      Pupils: Pupils are equal, round, and reactive to light. Neck:      Musculoskeletal: Neck supple. Trachea: No tracheal deviation. Cardiovascular:      Rate and Rhythm: Normal rate and regular rhythm. Heart sounds: Normal heart sounds. No murmur. No friction rub. No gallop. Pulmonary:      Effort: Pulmonary effort is normal. No respiratory distress. Breath sounds: Normal breath sounds. No wheezing or rales.    Abdominal: General: Bowel sounds are normal. There is no distension. Palpations: Abdomen is soft. There is no mass. Tenderness: There is no abdominal tenderness. There is no guarding or rebound. Musculoskeletal: Normal range of motion. Skin:     General: Skin is warm and dry. Findings: No erythema or rash. Neurological:      Mental Status: She is alert and oriented to person, place, and time. Comments: 4+/5 strength to upper right extremity with 5/5 strength to right lower and left upper and lower extremity. Subjective change in sensation to bilateral lower extremities. Tongue midline protrusion, no facial asymmetry. Psychiatric:         Behavior: Behavior normal.       NIH Stroke Scale  Interval: Pre-Treatment  Level of Consciousness (1a. ): Alert  LOC Questions (1b. ):  Answers both correctly  LOC Commands (1c. ): Performs both tasks correctly  Best Gaze (2. ): Normal  Visual (3. ): No visual loss  Facial Palsy (4. ): Normal symmetrical movement  Motor Arm, Left (5a. ): No drift  Motor Arm, Right (5b. ): No drift  Motor Leg, Left (6a. ): No drift  Motor Leg, Right (6b. ): No drift  Limb Ataxia (7. ): Absent  Sensory (8. ): Normal  Best Language (9. ): No aphasia  Dysarthria (10. ): Normal  Extinction and Inattention (11): No abnormality  Total: 0      DIFFERENTIAL  DIAGNOSIS     PLAN (LABS / IMAGING / EKG):  Orders Placed This Encounter   Procedures    CT Head WO Contrast    XR CHEST PORTABLE    CTA HEAD NECK W CONTRAST    MRI brain without contrast    STROKE PANEL    Immature Platelet Fraction    Protime-INR    APTT    PREVIOUS SPECIMEN    SPECIMEN REJECTION    Hemoglobin A1c    Lipid panel - fasting    CBC    CBC    CBC    APTT    Diet NPO Effective Now    Vital signs    Up as tolerated    Adv Diet as Tolerated (nurse communication)  Diet Carb Control    NIHSS/Neuro Checks    Tobacco cessation education    Swallow screen by nursing before diet and oral medications candidate due to mild symptoms. DIAGNOSTIC RESULTS / EMERGENCY DEPARTMENT COURSE / MDM     LABS:  Labs Reviewed   STROKE PANEL - Abnormal; Notable for the following components:       Result Value    Glucose 431 (*)     Sodium 133 (*)     Chloride 97 (*)     % CKMB 3.7 (*)     Seg Neutrophils 66 (*)     Immature Granulocytes 1 (*)     Myoglobin <21 (*)     All other components within normal limits   APTT - Abnormal; Notable for the following components:    PTT 19.7 (*)     All other components within normal limits   CBC - Abnormal; Notable for the following components:    RBC 3.80 (*)     All other components within normal limits   APTT - Abnormal; Notable for the following components:    PTT 47.9 (*)     All other components within normal limits   IMMATURE PLATELET FRACTION   PROTIME-INR   SPECIMEN REJECTION   PREVIOUS SPECIMEN   APTT   APTT   HEMOGLOBIN A1C   LIPID PANEL   CBC         RADIOLOGY:  Ct Head Wo Contrast    Result Date: 9/16/2020  EXAMINATION: CT OF THE HEAD WITHOUT CONTRAST  9/16/2020 7:47 pm TECHNIQUE: CT of the head was performed without the administration of intravenous contrast. Dose modulation, iterative reconstruction, and/or weight based adjustment of the mA/kV was utilized to reduce the radiation dose to as low as reasonably achievable. COMPARISON: 04/25/2018 HISTORY: ORDERING SYSTEM PROVIDED HISTORY: Stroke TECHNOLOGIST PROVIDED HISTORY: Stroke FINDINGS: Examination is degraded by motion. BRAIN/VENTRICLES: There is no acute intracranial hemorrhage, mass effect or midline shift. No abnormal extra-axial fluid collection. There is no evidence of hydrocephalus. There is hypoattenuation and loss of gray-white differentiation within the right parieto-occipital lobe. This is in the area of previously seen infarction, but is larger/more extensive. ORBITS: The visualized portion of the orbits demonstrate no acute abnormality.  SINUSES: The visualized paranasal sinuses and mastoid air cells Bilateral carotid bifurcation internal and external carotid arteries are patent and otherwise unremarkable in appearance. No evidence of carotid dissection is identified. VERTEBRAL ARTERIES: No dissection, arterial injury, or significant stenosis. SOFT TISSUES: The lung apices are clear. No cervical or superior mediastinal lymphadenopathy. The larynx and pharynx are unremarkable. No acute abnormality of the salivary and thyroid glands. BONES: No acute osseous abnormality. CTA HEAD: ANTERIOR CIRCULATION: No significant stenosis of the intracranial internal carotid, anterior cerebral, or middle cerebral arteries. No aneurysm. POSTERIOR CIRCULATION: No significant stenosis of the vertebral, basilar, or posterior cerebral arteries. No aneurysm. OTHER: No dural venous sinus thrombosis on this non-dedicated study. BRAIN: No mass effect or midline shift. No extra-axial fluid collection. The gray-white differentiation is maintained. Redemonstration of hypoattenuation within the right parietal temporal lobe is noted. 1. Mid left common carotid artery lateral wall irregular atherosclerotic plaque versus clot which extends centrally within the lumen causing approximately 60% arterial stenosis over a segment measuring 1.5 cm. 2. Otherwise, unremarkable CT angiogram of the head and neck. 3. Redemonstration of right parietal temporal hypoattenuation and volume loss consistent with subacute to remote infarction. RECOMMENDATIONS: Recommend MRI brain with diffusion-weighted imaging for further evaluation of acute ischemia. EMERGENCY DEPARTMENT COURSE:  ED Course as of Sep 17 0307   Wed Sep 16, 2020   2214 Await CTA. Plan admission for complex migraine versus CVA. [MS]   1050 Admission for MRI.    [MS]      ED Course User Index  [MS] Edmar Higginbotham,      CTA showing area of focal stenosis approximately 60% over left common carotid. Patient to be admitted to neuro stepdown. Started on low-dose heparin drip.

## 2020-09-17 NOTE — CONSULTS
Department of Endovascular Neurosurgery                                         Resident Consult Note    Reason for Consult: Numbness and tingling right side arm and leg  Endovascular Neurosurgeon:   []Dr. Luis E Meredith  [x]Dr. Adail Wood    History Obtained From:  patient    CHIEF COMPLAINT:       Numbness and tingling right-sided arm and leg    HISTORY OF PRESENT ILLNESS:       The patient is a 60 y.o. female who presents with complaint of numbness and tingling on the right side of her arm and leg, patient was gardening around 7:00 PM, when the patient  noticed that she was not acting herself, she noticed that she had also some weakness on the right side, no facial droop or slurred speech, patient has a history of CVA with loop recorder in place, her old CVA affected her left side and fully recovered with little residual effects,      LKW: 7:00 PM,  TPA: Not a candidate for TPA, NIH 1, no disabling symptoms  Endovascular:   No LVO  CT WO contrast: Unremarkable  -CTA head and neck: Mild left common carotid artery lateral wall irregularity atherosclerotic plaque versus clot which extends centrally within the lumen causing approximately 60% arterial stenosis over a segment measuring 1.5 cm, otherwise unremarkable  NIHSS: 1 for sensory loss on the right side,  Anticoagulation: No anticoagulation  LDL: Ordered  A1C: Ordered     PAST MEDICAL HISTORY :       Past Medical History:        Diagnosis Date    Breast cancer ([address]     Depression     HTN (hypertension)     Hyperlipidemia     Ovarian cyst, bilateral     Pancreatitis     Renal stone        Past Surgical History:        Procedure Laterality Date    APPENDECTOMY      BREAST LUMPECTOMY      bilateral breasts    BREAST SURGERY      INGUINAL HERNIA REPAIR      LYMPHADENECTOMY Bilateral     TUNNELED VENOUS PORT PLACEMENT         Social History:   Social History     Socioeconomic History    Marital status: Single     Spouse name: Not on file    Number of children: Not on file    Years of education: Not on file    Highest education level: Not on file   Occupational History     Employer: NONE   Social Needs    Financial resource strain: Not on file    Food insecurity     Worry: Not on file     Inability: Not on file    Transportation needs     Medical: Not on file     Non-medical: Not on file   Tobacco Use    Smoking status: Never Smoker    Smokeless tobacco: Never Used   Substance and Sexual Activity    Alcohol use: Yes     Comment: rare, USED TO DRINK 2 BOTTLES OF WINE/WK QUIT LAST FRI    Drug use: No    Sexual activity: Yes     Partners: Male   Lifestyle    Physical activity     Days per week: Not on file     Minutes per session: Not on file    Stress: Not on file   Relationships    Social connections     Talks on phone: Not on file     Gets together: Not on file     Attends Jain service: Not on file     Active member of club or organization: Not on file     Attends meetings of clubs or organizations: Not on file     Relationship status: Not on file    Intimate partner violence     Fear of current or ex partner: Not on file     Emotionally abused: Not on file     Physically abused: Not on file     Forced sexual activity: Not on file   Other Topics Concern    Not on file   Social History Narrative    ** Merged History Encounter **            Family History:       Problem Relation Age of Onset    Coronary Art Dis Mother     Heart Disease Mother     Coronary Art Dis Father     Diabetes Father     High Blood Pressure Father     Coronary Art Dis Sister        Allergies:  Compazine [prochlorperazine maleate]; Prochlorperazine edisylate; Reglan [metoclopramide]; and Vicodin [hydrocodone-acetaminophen]    Home Medications:  Prior to Admission medications    Medication Sig Start Date End Date Taking?  Authorizing Provider   lisinopril-hydrochlorothiazide (PRINZIDE;ZESTORETIC) 20-12.5 MG per tablet Take 1 tablet by mouth daily Historical Provider, MD   aspirin 81 MG tablet Take 81 mg by mouth daily    Historical Provider, MD   butalbital-APAP-caffeine (FIORICET) -40 MG CAPS per capsule Take 1 capsule by mouth every 4 hours as needed for Headaches 4/25/18   Kendy Oden, APRN - CNP   metoprolol (LOPRESSOR) 50 MG tablet Take 1 tablet by mouth daily. 12/12/14   Walker Sahu MD   Multiple Vitamin (MULTIVITAMIN) tablet Take 1 tablet by mouth daily. 12/12/14   Walker Sahu MD   citalopram (CELEXA) 20 MG tablet Take 1 tablet by mouth daily. 12/12/14   Walker Sahu MD   ibuprofen (ADVIL;MOTRIN) 400 MG tablet TAKE 1 TABLET BY MOUTH EVERY 6 HOURS AS NEEDED FOR PAIN. 5/19/14   Mirlande Devine MD   Blood Pressure KIT Check blood pressure daily. 3/13/14   Shawanda Mae MD       Current Medications:   Current Facility-Administered Medications: [START ON 9/17/2020] aspirin EC tablet 325 mg, 325 mg, Oral, Daily  heparin (porcine) injection 2,990 Units, 60 Units/kg, Intravenous, PRN  heparin (porcine) injection 1,500 Units, 30 Units/kg, Intravenous, PRN  heparin 25,000 units in dextrose 5% 250 mL infusion, 12 Units/kg/hr, Intravenous, Continuous    REVIEW OF SYSTEMS:       CONSTITUTIONAL: negative for fatigue and malaise   EYES: negative for double vision and photophobia    HEENT: negative for tinnitus and sore throat   RESPIRATORY: negative for cough, shortness of breath   CARDIOVASCULAR: negative for chest pain, palpitations   GASTROINTESTINAL: negative for nausea, vomiting   GENITOURINARY: negative for incontinence   MUSCULOSKELETAL: negative for neck or back pain   NEUROLOGICAL: negative for seizures   PSYCHIATRIC: negative for fatigue     Review of systems otherwise negative.     PHYSICAL EXAM:       /78   Pulse 76   Temp 98.6 °F (37 °C) (Oral)   Resp 12   Ht 4' 11\" (1.499 m)   Wt 110 lb (49.9 kg)   SpO2 96%   BMI 22.22 kg/m²     CONSTITUTIONAL:  Well developed, well nourished, alert and oriented x 3, in sensory loss; patient feels pinprick is less sharp or is dull on the affected side; there is a loss of superficial pain with pinprick but patient is aware of being touched   9. Best Language:  0 - no aphasia, normal  10. Dysarthria:  0 - normal  11.   Extinction and Inattention:  0 - no abnormality    TOTAL:  1     SKIN:  no rash      LABS AND IMAGING:     CBC with Differential:    Lab Results   Component Value Date    WBC 8.7 09/16/2020    RBC 4.45 09/16/2020    RBC 3.30 05/08/2012    HGB 13.8 09/16/2020    HCT 41.5 09/16/2020    PLT See Reflexed IPF Result 09/16/2020     05/08/2012    MCV 93.3 09/16/2020    MCH 31.0 09/16/2020    MCHC 33.3 09/16/2020    RDW 12.0 09/16/2020    LYMPHOPCT 25 09/16/2020    MONOPCT 6 09/16/2020    BASOPCT 0 09/16/2020    MONOSABS 0.54 09/16/2020    LYMPHSABS 2.16 09/16/2020    EOSABS 0.18 09/16/2020    BASOSABS 0.03 09/16/2020    DIFFTYPE NOT REPORTED 09/16/2020     BMP:    Lab Results   Component Value Date     09/16/2020    K 4.1 09/16/2020    CL 97 09/16/2020    CO2 22 09/16/2020    BUN 21 09/16/2020    LABALBU 4.5 03/08/2014    LABALBU 4.1 09/09/2011    CREATININE 0.75 09/16/2020    CALCIUM 9.5 09/16/2020    GFRAA >60 09/16/2020    LABGLOM >60 09/16/2020    GLUCOSE 431 09/16/2020    GLUCOSE 103 05/08/2012           ASSESSMENT AND PLAN:       Patient Active Problem List   Diagnosis    Ovarian cyst, bilateral    Renal stone    Breast cancer ([address]    Mass of lung    S/P lumpectomy of breast    Pain of right breast    Pleural fibrosis    Major depressive disorder, recurrent episode ([address]    Alcohol abuse    Alcohol-induced chronic pancreatitis ([address]    Hypertensive urgency    Uncontrolled hypertension    Gastritis    Mild dehydration    Elevated LFTs    Chronic ankle pain    Medication refill    Stroke determined by clinical assessment [facility])       The patient is a 60 y.o. female who presents with complaint of numbness and tingling on the right side of

## 2020-09-17 NOTE — H&P
Neuro ICU History & Physical    Patient Name: Srinath Ernandez  Patient : 1955  Room/Bed: 6431/1667-52  Code Status: FULL  Allergies: Allergies   Allergen Reactions    Compazine [Prochlorperazine Maleate]     Prochlorperazine Edisylate     Reglan [Metoclopramide]     Vicodin [Hydrocodone-Acetaminophen]        CHIEF COMPLAINT     Right hand paresthesias    HPI    History Obtained From: Patient, EMR    The patient is a 60 y.o. female with a history of HTN, HLD, breast CA and previous CVA (around 3 years ago) who presented with complaints of right hand paresthesias and ataxia. LKW around 1900. Per records, patient's  noticed patient was not acting like herself and had right sided weakness. Patient reports she was gardening and noticed her right hand wasn't working and felt like it had \"electricity\" running through it. She states she went inside and asked her daughter to call 911 as she had similar symptoms during her last CVA, but on her left side. On arrival to the ED, NIH 1 for sensory. CT Head with no acute abnormality. TPA not administered due to low NIH and no disabling symptoms. CTA Head/Neck showed left common carotid artery lateral wall irregularity, plaque vs clot, causing ~60@ stenosis. Patient was loaded with 300mg Plavix, continued on Aspirin and started on low dose Heparin infusion. She was initially admitted to [facility] under the General Neurology team.  Neuro Critical Care was asked to take over and transfer patient to Neuro ICU for close monitoring as she is on dual antiplatelet therapy and heparin infusion. Of note, patient reports a history of a previous CVA about 3 years ago. She reports her left arm was weak at that time, but has fully recovered. She has a Loop recorder.       Admitted to ICU From: Neuro Stepdown  Reason for ICU Admission: Close monitoring on dual antiplatelet and heparin infusion       PATIENT HISTORY   Past Medical History: Diagnosis Date    [facility] [address]     Depression     HTN (hypertension)     Hyperlipidemia     Ovarian cyst, bilateral     Pancreatitis     Renal stone        Past Surgical History:        Procedure Laterality Date    APPENDECTOMY      BREAST LUMPECTOMY      bilateral breasts    BREAST SURGERY      INGUINAL HERNIA REPAIR      LYMPHADENECTOMY Bilateral     TUNNELED VENOUS PORT PLACEMENT         Social History:   Social History     Socioeconomic History    Marital status: Single     Spouse name: Not on file    Number of children: Not on file    Years of education: Not on file    Highest education level: Not on file   Occupational History     Employer: NONE   Social Needs    Financial resource strain: Not on file    Food insecurity     Worry: Not on file     Inability: Not on file    Transportation needs     Medical: Not on file     Non-medical: Not on file   Tobacco Use    Smoking status: Never Smoker    Smokeless tobacco: Never Used   Substance and Sexual Activity    Alcohol use: Yes     Comment: rare, USED TO DRINK 2 BOTTLES OF WINE/WK QUIT LAST FRI    Drug use: No    Sexual activity: Yes     Partners: Male   Lifestyle    Physical activity     Days per week: Not on file     Minutes per session: Not on file    Stress: Not on file   Relationships    Social connections     Talks on phone: Not on file     Gets together: Not on file     Attends Caodaism service: Not on file     Active member of club or organization: Not on file     Attends meetings of clubs or organizations: Not on file     Relationship status: Not on file    Intimate partner violence     Fear of current or ex partner: Not on file     Emotionally abused: Not on file     Physically abused: Not on file     Forced sexual activity: Not on file   Other Topics Concern    Not on file   Social History Narrative    ** Merged History Encounter **            Family History:       Problem Relation Age of Onset    Coronary Art Dis Mother     Heart Disease Mother     Coronary Art Dis Father     Diabetes Father     High Blood Pressure Father     Coronary Art Dis Sister        Allergies:    Compazine [prochlorperazine maleate]; Prochlorperazine edisylate; Reglan [metoclopramide]; and Vicodin [hydrocodone-acetaminophen]    Medications Prior to Admission:    Medications Prior to Admission: lisinopril-hydrochlorothiazide (PRINZIDE;ZESTORETIC) 20-12.5 MG per tablet, Take 1 tablet by mouth daily  aspirin 81 MG tablet, Take 81 mg by mouth daily  butalbital-APAP-caffeine (FIORICET) -40 MG CAPS per capsule, Take 1 capsule by mouth every 4 hours as needed for Headaches  metoprolol (LOPRESSOR) 50 MG tablet, Take 1 tablet by mouth daily. Multiple Vitamin (MULTIVITAMIN) tablet, Take 1 tablet by mouth daily. citalopram (CELEXA) 20 MG tablet, Take 1 tablet by mouth daily. [DISCONTINUED] ibuprofen (ADVIL;MOTRIN) 400 MG tablet, TAKE 1 TABLET BY MOUTH EVERY 6 HOURS AS NEEDED FOR PAIN. Blood Pressure KIT, Check blood pressure daily.     Current Medications:  Current Facility-Administered Medications: aspirin EC tablet 81 mg, 81 mg, Oral, Daily  butalbital-acetaminophen-caffeine (FIORICET, ESGIC) per tablet 1 tablet, 1 tablet, Oral, Q4H PRN  citalopram (CELEXA) tablet 20 mg, 20 mg, Oral, Daily  metoprolol tartrate (LOPRESSOR) tablet 50 mg, 50 mg, Oral, Daily  lisinopril-hydroCHLOROthiazide (PRINZIDE;ZESTORETIC) 20-12.5 MG per tablet 1 tablet, 1 tablet, Oral, Daily  therapeutic multivitamin-minerals 1 tablet, 1 tablet, Oral, Daily  sodium chloride flush 0.9 % injection 10 mL, 10 mL, Intravenous, 2 times per day  sodium chloride flush 0.9 % injection 10 mL, 10 mL, Intravenous, PRN  polyethylene glycol (GLYCOLAX) packet 17 g, 17 g, Oral, Daily PRN  promethazine (PHENERGAN) tablet 12.5 mg, 12.5 mg, Oral, Q6H PRN **OR** ondansetron (ZOFRAN) injection 4 mg, 4 mg, Intravenous, Q6H PRN  clopidogrel (PLAVIX) tablet 75 mg, 75 mg, Oral, Daily  0.9 % sodium chloride infusion, , Intravenous, Continuous  atorvastatin (LIPITOR) tablet 80 mg, 80 mg, Oral, Nightly  folic acid (FOLVITE) tablet 1 mg, 1 mg, Oral, Daily  insulin lispro (HUMALOG) injection vial 0-12 Units, 0-12 Units, Subcutaneous, TID WC  insulin lispro (HUMALOG) injection vial 0-6 Units, 0-6 Units, Subcutaneous, Nightly  glucose (GLUTOSE) 40 % oral gel 15 g, 15 g, Oral, PRN  dextrose 50 % IV solution, 12.5 g, Intravenous, PRN  glucagon (rDNA) injection 1 mg, 1 mg, Intramuscular, PRN  dextrose 5 % solution, 100 mL/hr, Intravenous, PRN  potassium chloride (KLOR-CON M) extended release tablet 40 mEq, 40 mEq, Oral, PRN **OR** potassium bicarb-citric acid (EFFER-K) effervescent tablet 40 mEq, 40 mEq, Oral, PRN **OR** potassium chloride 10 mEq/100 mL IVPB (Peripheral Line), 10 mEq, Intravenous, PRN  heparin (porcine) injection 2,990 Units, 60 Units/kg, Intravenous, PRN  heparin (porcine) injection 1,500 Units, 30 Units/kg, Intravenous, PRN  heparin 25,000 units in dextrose 5% 250 mL infusion, 12 Units/kg/hr, Intravenous, Continuous    REVIEW OF SYSTEMS     CONSTITUTIONAL: negative for fatigue and malaise   EYES: negative for double vision and photophobia    HEENT: negative for tinnitus and sore throat   RESPIRATORY: negative for cough, shortness of breath   CARDIOVASCULAR: negative for chest pain, palpitations, or syncope   GASTROINTESTINAL: negative for abdominal pain, nausea, vomiting, diarrhea, or constipation    GENITOURINARY: negative for incontinence or retention    MUSCULOSKELETAL: negative for neck or back pain, negative for extremity pain   NEUROLOGICAL: Right hand weakness and numbness/tingling.   Negative for seizures, headaches, confusion, aphasia, dysarthria   PSYCHIATRIC: negative for agitation, hallucination, SI/HI   SKIN Negative for spontaneous contusions, rashes, or lesions      PHYSICAL EXAM:     BP (!) 148/84   Pulse 75   Temp 97.9 °F (36.6 °C) (Oral)   Resp 11   Ht 4' 11\" (1.499 m)   Wt 110 moderate sensory loss; patient feels pinprick is less sharp or is dull on the affected side; there is a loss of superficial pain with pinprick but patient is aware of being touched   9. Best Language:  0 - no aphasia, normal  10. Dysarthria:  0 - normal  11. Extinction and Inattention:  0 - no abnormality   TOTAL: 1    LABS AND IMAGING:     RECENT LABS:  CBC with Differential:    Lab Results   Component Value Date    WBC 8.2 09/17/2020    RBC 4.10 09/17/2020    RBC 3.30 05/08/2012    HGB 12.9 09/17/2020    HCT 39.4 09/17/2020     09/17/2020     05/08/2012    MCV 96.1 09/17/2020    MCH 31.5 09/17/2020    MCHC 32.7 09/17/2020    RDW 12.1 09/17/2020    LYMPHOPCT 25 09/16/2020    MONOPCT 6 09/16/2020    BASOPCT 0 09/16/2020    MONOSABS 0.54 09/16/2020    LYMPHSABS 2.16 09/16/2020    EOSABS 0.18 09/16/2020    BASOSABS 0.03 09/16/2020    DIFFTYPE NOT REPORTED 09/16/2020     BMP:    Lab Results   Component Value Date     09/16/2020    K 4.1 09/16/2020    CL 97 09/16/2020    CO2 22 09/16/2020    BUN 21 09/16/2020    LABALBU 4.5 03/08/2014    LABALBU 4.1 09/09/2011    CREATININE 0.75 09/16/2020    CALCIUM 9.5 09/16/2020    GFRAA >60 09/16/2020    LABGLOM >60 09/16/2020    GLUCOSE 431 09/16/2020    GLUCOSE 103 05/08/2012       RADIOLOGY:   Ct Head Wo Contrast  Result Date: 9/16/2020  Age-indeterminate right posterior MCA infarction. No acute intracranial hemorrhage. Critical results were called by Dr. Burton Goddard MD to [facility] on 9/16/2020 at 20:33. Xr Chest Portable  Result Date: 9/16/2020  No acute cardiopulmonary abnormality. Cta Head Neck W Contrast  Result Date: 9/16/2020  1. Mid left common carotid artery lateral wall irregular atherosclerotic plaque versus clot which extends centrally within the lumen causing approximately 60% arterial stenosis over a segment measuring 1.5 cm. 2. Otherwise, unremarkable CT angiogram of the head and neck.  3. Redemonstration of right parietal temporal hypoattenuation and volume loss consistent with subacute to remote infarction. RECOMMENDATIONS: Recommend MRI brain with diffusion-weighted imaging for further evaluation of acute ischemia. Mri Brain Without Contrast  Result Date: 9/17/2020  Multiple small acute infarcts within the left cerebral hemisphere. The findings were sent to the Radiology Results [address] at 3:33 pm on 9/17/2020to be communicated to a licensed caregiver. Labs and Images reviewed with:    [x] Jesse Mayers MD    [] Randy Watts MD  [] Shon Perez MD  --[] there are no new interval images to review. ASSESSMENT AND PLAN:       The patient is 61 yo female with a history of HTN, HLD, breast CA and previous CVA (around 3 years ago) who presented with complaints of right hand paresthesias and ataxia. Found to have left common carotid plaque vs thrombus with ~60% stenosis. Loaded with Plavix, continued on Aspirin and started on low dose Heparin infusion.      NEUROLOGIC:  - Multiple scattered small acute left hemispheric infarcts  - Left common carotid plaque vs thrombus with ~60% stenosis  - Continue low dose heparin infusion  - Continue Aspirin 81mg QD, Plavix 75mg QD and high dose Lipitor 80mg QHS  - Neuro Endovascular following; plan for repeat CTA at 72h  - Goal SBP<200  - Neuro checks per protocol    CARDIOVASCULAR:  - Goal SBP<200  - Hold home antihypertensives for now  - Troponin 6, EKG NSR  - Echo EF 93%, grade I diastolic dysfunction  - Per recent Cardiology note, previous EDGAR negative for PFO  - Loop recorder in place, Interrogate device  - Lipid panel; LDL 76, Cholesterol 162  - Lipitor 80mg QHS  - Continue telemetry    PULMONARY:  - Maintaining O2 sats on room air  - Baseline CXR with no acute cardiopulmonary abnormality    RENAL/FLUID/ELECTROLYTE:  - Normal renal functioning  - BUN 11/ Creatinine 0.46  - Monitor I&O  - Tiffany@[employer]  - Replace electrolytes PRN  - Daily BMP    GI/NUTRITION:  NUTRITION:  Diet NPO Effective Now   - Passed nursing bedside swallow  - Started on Carb Control diet  - Bowel regimen: Milk of Mag PRN  - GI prophylaxis: Not indicated    ID:  - Afebrile  - No leukocytosis, WBC 8.2  - COVID-19 negative  - Continue to monitor for fevers  - Daily CBC    HEME:   - H&H 12.9/39.4  - Platelets 403  - Daily CBC    ENDOCRINE:  - Continue to monitor blood glucose, goal <180  - History of Type 2 diabetes  - Hemoglobin A1C 12.2  - Hyperglycemia in ED in the 400's  - High dose insulin sliding scale  - Clarify home diabetic meds    OTHER:  - PT/OT/ST    PROPHYLAXIS:  Stress ulcer: N/A    DVT PROPHYLAXIS:  - SCD sleeves - Thigh High   - On Heparin infusion    DISPOSITION: Admit to Neuro ICU for close neurological monitoring.         [facility] - [id number] [facility]  Neuro Critical Care Service   Pager [phone]  9/17/2020     8:46 AM

## 2020-09-17 NOTE — ED NOTES
..    9/16/2020 8:48 PM    Patient: Lali Hurst, 60 y.o., female Race:   Patient Address: [address] [place] [address]  Incident Address:  [address] [place] [address]     [x] [facility]  [] [facility]  [] [facility] ORTHOPEDIC AND SPINE [facility] AT [place]   [] [facility]  [] [facility]  Patient Phone #: [phone]   Insurance: Payor: Demian Keene /  /  /   Vivian Traylor:  []  Yes   [x]  No  Emergency Contact: Extended Emergency Contact Information  Primary Emergency Contact: [address] Phone: [phone]  Relation: Child  MRN: [id number]  [facility]#  [id number]  YOB: 1955  Primary Care Physician: ASH Solorzano - CNP  Advance Directive: [x] Full Code   []DNR-CC   []DNR-CCA    MSU Crew: ANNETTE Christianson - CT Tech, MICHAEL Henao - Paramedic, Sara Pierson - SANJEEV    Pt Transported To:  [x] [place] [address]  [] [facility]  [] [facility]  [] [facility]  [] [facility]  [] [facility]  [facility] [] [place]     Was patient transported to closest facility? [x]Yes  []No (if No specify reason)   []   Patient/family request    []   Divert to specialist       Response Code   [] 2  [x] 3  []   Change  [] 2  [] 3   Transport Code   [x] 2  [] 3  []   Change  [] 2  [] 3     Mileage:  [address]   Total Miles 0.8     Call Received 9/16/2020 1924   Dispatched 9/16/2020 1924   Enroute 9/16/2020 1927   Arrived Scene 9/16/2020 1937   At Patient 9/16/2020 1945   Decision to Scan 9/16/2020 1946   Scan 9/16/2020 1951   Departed Scene 9/16/2020 2001   [facility] 9/16/2020 2005   Departed Hospital 9/16/2020 2023   In Service 9/16/2020 2023         Allergies  [] Updated/Reviewed by MSU  [x] Historical Data Only  [] Unavailable  is allergic to compazine [prochlorperazine maleate]; prochlorperazine edisylate; reglan [metoclopramide]; and vicodin [hydrocodone-acetaminophen].   Medications  [] Updated/Reviewed by MSU  [x] Historical Data Only  [] Unavailable  Prior to Admission medications    Medication Sig Start Date 0      8. Sensory 1      9. Language/Aphasia 0      10. Dysarthria 0      11. Neglect 0      TOTAL 1          Research:    RACE Score: Neg Time: 1945  StrokeVAN Score: Neg Time: 1945    Time Last Known Well: 1900 9/16/2020    Narrative:    Dispatched to possible CVA per [facility]. Arrived to find Cleo Solares, 60 y.o., female c/o Right sided numbness and left frontal headache. Report received from [address] [place] [address] at patients side. Pt states she was planting flowers when she suddenly didn't feel well. She went upstairs to tell her  and he called 911 because she didn't look right. Pt c/o right sided arm numbness and tingling and a left frontal headache that cam on suddenly. Pt has decreased sensation to right side, decreased hand grasp on right sided and generalized weakness to right side. Swelling noted to right arm. Denies injury. C/o pain to right shoulder. Denies dizziness or blurry vision. No facial droop or slurred speech noted. Pt has hx of CVA with loop recorder in place. States old CVA affected left side and is mostly fully recovered with little residual affects. Decision was made to scan. Dr. Koch Lay at patient side via telemedicine unit. Patient received the following medications prior to MSU arrival: NONE  Patient has the following lines prior to MSU arrival: 22g IV Right hand  Patient has the following airway placed prior to MSU arrival: NONE    Patient was transferred to cot via 2 person assist and secured with straps x3. Zoll ECG, SpO2, and NIBP applied and monitored throughout encounter. HOB maintained at 30 degrees. Patient was transferred to ambulance, cot secured in scan position. Non contrast CT scan performed. After scan cot secured in transport position. IV tPA inclusion/exclusion criteria reviewed with patient and physician. Candidate for IV tPA therapy?   [] Yes   [x] No - due to the following exclusion criteria:    [] ICH   [] Taking anticoagulant -   [] Beyond last time known well window  [] At or returned to baseline   [] Marked improvement of symptoms  [x] No disabling symptoms  [x] Other - NIHSS of 1    Patient is being transported to [place] [address] . During transport patient rests comfortably. Cabin temp maintained at 70-72 °F throughout transport. Patient was transferred to bed 23 via 4 person sheet lift. Patient care handoff completed with Kathya Paz RN at bedside. Imaging:  Images were obtained onboard the MSU including:  [x] CT brain without contrast - see results below:      Ct Head Wo Contrast    Result Date: 9/16/2020  EXAMINATION: CT OF THE HEAD WITHOUT CONTRAST  9/16/2020 7:47 pm TECHNIQUE: CT of the head was performed without the administration of intravenous contrast. Dose modulation, iterative reconstruction, and/or weight based adjustment of the mA/kV was utilized to reduce the radiation dose to as low as reasonably achievable. COMPARISON: 04/25/2018 HISTORY: ORDERING SYSTEM PROVIDED HISTORY: Stroke TECHNOLOGIST PROVIDED HISTORY: Stroke FINDINGS: Examination is degraded by motion. BRAIN/VENTRICLES: There is no acute intracranial hemorrhage, mass effect or midline shift. No abnormal extra-axial fluid collection. There is no evidence of hydrocephalus. There is hypoattenuation and loss of gray-white differentiation within the right parieto-occipital lobe. This is in the area of previously seen infarction, but is larger/more extensive. ORBITS: The visualized portion of the orbits demonstrate no acute abnormality. SINUSES: The visualized paranasal sinuses and mastoid air cells demonstrate no acute abnormality. SOFT TISSUES/SKULL:  No acute abnormality of the visualized skull or soft tissues. Age-indeterminate right posterior MCA infarction. No acute intracranial hemorrhage. Critical results were called by Dr. Luis Alberto Chavez MD to [facility] on 9/16/2020 at 20:33.        Physical assessment performed:    Neuro: Right arm numbness and tingling, Left frontal Channel:     []    Cell Phone     Med Control Orders Received:   [x] No  []  Yes:     Med Control Physician (if on line medical direction received)  -        Hospital Team Alert:   []    Trauma Alert    []    STEMI Alert         []    Stroke Alert    []    RACE Alert      Description Of Valuables: Earrings x's 4 in plastic bag given to patient  Patient Valuables:   [x]    With Patient    []    Not Received    [x]    ER / Lab     Call Outcome:   [x]    Transport to Facility    []    Care Transferred to [facility]    []    Cancelled    []    Patient Refusal    []                           Mobile Stroke Unit    Electronically signed by Aminta Mcintyre RN on 9/16/20 at 8:48 PM EDT     Aminta Mcintyre RN  09/16/20 5788

## 2020-09-17 NOTE — PROGRESS NOTES
ENDOVASCULAR NEUROSURGERY PROGRESS NOTE  9/17/2020 11:04 AM  Subjective:   Admit Date: 9/16/2020  PCP: ASH Robbins - CNP    CT head obtained no blood. CT head and neck with left common carotid artery clot noted. She was loaded with Plavix 300 mg and continued on home dose aspirin 81 mg daily. Started on heparin drip. Objective:   Vitals: BP (!) 140/90   Pulse 68   Temp 98.1 °F (36.7 °C) (Oral)   Resp 12   Ht 4' 11\" (1.499 m)   Wt 110 lb (49.9 kg)   SpO2 97%   BMI 22.22 kg/m²   General appearance: NAD. HEENT: Atraumatic. Neck: Neck is supple. Lungs: No respiratory distress noted. Heart: normal sinus rhythm on tele. .   Abdomen: Soft nontender. Extremities: No lower limb edema noted. Neurologic:awake, following simple commands appropriately, able to name simple objects, intact comprehension, no dysarthria noted. CN: Has intact extraocular muscles movements, no facial droop noted, decreased sensation to simple touch noted on right face. MOTOR: Has good strength in both upper and lower extremities, moving both upper and lower extremities against gravity with no drift. SENSORY: Decreased sensation to simple touch in right upper extremity. COORDINATION: Intact finger-nose test bilaterally with no dysmetria noted.     Medications and labs:   Scheduled Meds:   aspirin  81 mg Oral Daily    citalopram  20 mg Oral Daily    [Held by provider] metoprolol tartrate  50 mg Oral Daily    [Held by provider] lisinopril-hydroCHLOROthiazide  1 tablet Oral Daily    therapeutic multivitamin-minerals  1 tablet Oral Daily    sodium chloride flush  10 mL Intravenous 2 times per day    clopidogrel  75 mg Oral Daily    atorvastatin  80 mg Oral Nightly    folic acid  1 mg Oral Daily    insulin lispro  0-9 Units Subcutaneous Nightly    insulin lispro  0-18 Units Subcutaneous TID WC     Continuous Infusions:   sodium chloride 100 mL/hr at 09/17/20 0200    dextrose      heparin (porcine) 12 Units/kg/hr (09/16/20 2323)     CBC:   Recent Labs     09/16/20 2025 09/17/20  0116 09/17/20  0719   WBC 8.7 9.0 8.2   HGB 13.8 12.1 12.9   PLT See Reflexed IPF Result 271 [id number]     BMP:    Recent Labs     09/16/20 2021 09/16/20 2025 09/17/20  0719   NA  --  133* 139   K  --  4.1 3.7   CL  --  97* 108*   CO2  --  22 19*   BUN  --  21 11   CREATININE 0.58 0.75 0.46*   GLUCOSE  --  431* 165*     Hepatic: No results for input(s): AST, ALT, ALB, BILITOT, ALKPHOS in the last 72 hours. Troponin: No results for input(s): TROPONINI in the last 72 hours. BNP: No results for input(s): BNP in the last 72 hours. Lipids:   Recent Labs     09/17/20  0719   CHOL 162   HDL 69     INR:   Recent Labs     09/16/20 2025 09/16/20  2151   INR Unable to perform testing: Specimen clotted. RN WALT NOTIFIED 0.9       Assessment and Recommendations:     67-year-old female who is -American with a past medical history including hypertension, depression, hyperlipidemia and history of breast cancer. She had prior history of right MCA/parietal ischemic stroke. She is on aspirin 81 mg daily at home. She presented with acute onset right hand numbness. NIH stroke scale at 1. No TPA given. Vessel images with left common carotid artery clot noted. She was loaded with Plavix 300 mg, continued on aspirin 81 mg daily and started on heparin drip. This morning exam is stable. She has right upper extremity and right face numbness noted. LDL at 76, HDL at 69, total cholesterol at 162. 1. Close monitoring for neurological status. Consider moving the patient to neuro ICU for close monitoring. 2. Continue heparin drip. 3. CTA neck in 3 days to reevaluate left common carotid clot. 4. Continue dual antiplatelet therapy with aspirin Plavix. 5. Statin therapy. 6. IV hydration. 7. Transthoracic echo.       Placido Kelly MD  Stroke, [facility] Stroke [address] [facility] Comprehensive Stroke [place] [address]  Electronically signed 9/17/2020 at 11:04 AM

## 2020-09-17 NOTE — PROGRESS NOTES
Neurointervention requesting patient to be in neuro ICU . Discussed with Maryana Barrios CNP .  Transferred to neurocritical care service

## 2020-09-17 NOTE — PROGRESS NOTES
Physical Therapy  DATE: 2020    NAME: Jeanette Quinn  MRN: [id number]   : 1955    Patient not seen this date for Physical Therapy due to:  [] Blood transfusion in progress  [] Hemodialysis  []  Patient Declined  [] Spine Precautions   [] Strict Bedrest  [] Surgery/ Procedure  [] Testing      [x] Other--pt moved from [facility] to neuro ICU; will check pt status 20 and evaluate as appropriate. [] PT being discontinued at this time. Patient independent. No further needs. [] PT being discontinued at this time as the patient has been transferred to palliative care. No further needs.     Sarita Williamson, PT

## 2020-09-17 NOTE — ED NOTES
Report called to nurse on [facility], nurse denies any further questions      Robinson Landon RN  09/17/20 0000

## 2020-09-17 NOTE — PROGRESS NOTES
Occupational Therapy   Occupational Therapy Initial Assessment  Date: 2020   Patient Name: Ernesto Leon  MRN: [id number]     : 1955    Date of Service: 2020  Chief Complaint   Patient presents with    Cerebrovascular Accident     Copied from H&P  The patient is a 65 y. o. female who presents with complaint of numbness and tingling on the right side of her arm and leg, patient was gardening around 7:00 PM, when the patient  noticed that she was not acting herself, she noticed that she had also some weakness on the right side, no facial droop or slurred speech, patient has a history of CVA with loop recorder in place, her old CVA affected her left side and fully recovered with little residual effects,    Discharge Recommendations:  Patient would benefit from continued therapy after discharge     Assessment   Performance deficits / Impairments: Decreased functional mobility ; Decreased endurance;Decreased ADL status; Decreased sensation;Decreased high-level IADLs;Decreased fine motor control  Assessment: Pt would benefit from continued acute care and post acute care OT to address impairments in ADLs, IADLs, functional mobility, endurance, sensation and fine motor control. Pt would benefit from education on fall prevention, safety awareness and ECWS. Pt would benefit from increased fine motor coordination in R hand to improve I in ADLs/functional activities. Prognosis: Good  Decision Making: Medium Complexity  OT Education: OT Role;Plan of Care  Patient Education: pt educated on role of OT, POC, pt demo good understanding  REQUIRES OT FOLLOW UP: Yes  Activity Tolerance  Activity Tolerance: Patient Tolerated treatment well  Safety Devices  Safety Devices in place: Yes  Type of devices: Left in bed;Nurse notified;Call light within reach  Restraints  Initially in place: No         Patient Diagnosis(es): The encounter diagnosis was Cerebrovascular accident (CV[facility]), [facility] has a past medical history of Breast cancer ([facility] Depression, HTN (hypertension), Hyperlipidemia, Ovarian cyst, bilateral, Pancreatitis, and Renal stone. has a past surgical history that includes Inguinal hernia repair; Appendectomy; lymphadenectomy (Bilateral); Tunneled venous port placement; Breast lumpectomy; and Breast surgery.          Restrictions  Restrictions/Precautions  Restrictions/Precautions: General Precautions, Up as Tolerated  Required Braces or Orthoses?: No    Subjective   General  Patient assessed for rehabilitation services?: Yes  Family / Caregiver Present: No  Patient Currently in Pain: Denies(pt reported no pain but experiencing nausea)    Social/Functional History  Social/Functional History  Lives With: Alone  Type of Home: House(upper floor of duplex)  Home Layout: Performs ADL's on one level, Two level  Home Access: Stairs to enter with rails  Entrance Stairs - Number of Steps: 15 GEORGE  Entrance Stairs - Rails: (pt unable to recall)  Bathroom Shower/Tub: Tub/Shower unit  Bathroom Toilet: Standard  Bathroom Equipment: Grab bars in shower, Shower chair  Home Equipment: Cane(pt reports does not use cane)  ADL Assistance: Independent  Homemaking Assistance: Independent  Homemaking Responsibilities: Yes(pt reports cooks, cleans, does laundry)  Ambulation Assistance: Independent(no device)  Transfer Assistance: Independent  Active : Yes  Mode of Transportation: Car  Occupation: On disability  Leisure & Hobbies: movies, walk dog, go to park  Additional Comments: pt has 1 small dog, pt reports friend lives in lower portion [facility], always home, can provide A if needed, pt is R hand dominant     Objective   Vision: Impaired  Vision Exceptions: Wears glasses for distance  Hearing: Within functional limits    Orientation  Overall Orientation Status: Within Functional Limits  Observation/Palpation  Posture: Good    Balance  Sitting Balance: Supervision(~30 min seated EOB for ADLs)  Standing Balance: Stand by assistance  Standing Balance  Time: ~3-4 min  Activity: standing EOB for LB dressing, functional mobility  Comment: no device    Functional Mobility  Functional - Mobility Device: No device  Activity: Other(steps EOB)  Assist Level: Stand by assistance    ADL  Feeding: Independent;Setup; Increased time to complete  Grooming: Increased time to complete;Setup;Minimal assistance(pt performed oral hygiene seated EOB, pt demo difficulties with fine motor control in R hand, pt required A to open and close containers, pt demo spilled water cup, pt demo numerous dropped items)  UE Bathing: Minimal assistance; Increased time to complete;Setup(pt performed full bathing ADL seated EOB, pt required A to open and dispense soap d/t fine motor impairments in R hand)  LE Bathing: Minimal assistance; Increased time to complete;Setup  UE Dressing: Minimal assistance; Increased time to complete;Setup(pt performed pericare seated EOB, pt required A to dispense soap d/t fine motor impairments in R hand)  LE Dressing: Contact guard assistance; Increased time to complete;Setup(pt donned/doffed pants and underwear seated EOB, pt demo difficulty grasping clothing with R hand)  Toileting: Minimal assistance; Increased time to complete;Setup  Additional Comments: Pt supine in bed on OT arrival. Pt performed bed mobility to sit EOB. Pt performed UB dressing, UB bathing and oral hygiene seated EOB. Pt stood EOB to perform LB dressing with no device. Pt performed bed mobility to return to supine in bed. Pt left supine, call light within reach and RN notified on OT exit. Tone RUE  RUE Tone: Normotonic  Tone LUE  LUE Tone: Normotonic  Coordination  Movements Are Fluid And Coordinated: No  Coordination and Movement description: Fine motor impairments;Decreased accuracy; Right UE     Bed mobility  Rolling to Left: Independent  Supine to Sit: Supervision  Sit to Supine: Supervision  Scooting: Supervision  Comment: HOB elevated Transfers  Stand Step Transfers: Stand by assistance  Sit to stand: Stand by assistance  Stand to sit: Stand by assistance     Cognition  Overall Cognitive Status: WFL  Perception  Overall Perceptual Status: WFL     Sensation  Overall Sensation Status: Impaired(pt reports numbness/tingling in B hands and feet)      LUE AROM (degrees)  LUE AROM : WFL  Left Hand AROM (degrees)  Left Hand AROM: WFL  RUE AROM (degrees)  RUE AROM : WFL  Right Hand AROM (degrees)  Right Hand AROM: WFL     LUE Strength  Gross LUE Strength: WFL  RUE Strength  Gross RUE Strength: WFL     Plan   Plan  Times per week: 3-4x/week  Current Treatment Recommendations: Functional Mobility Training, Endurance Training, Safety Education & Training, Self-Care / ADL(fine motor coordination)    AM-PAC Score  AM-PAC Inpatient Daily Activity Raw Score: 19 (09/17/20 1519)  AM-PAC Inpatient ADL T-Scale Score : 40.22 (09/17/20 1519)  ADL Inpatient CMS 0-100% Score: 42.8 (09/17/20 1519)  ADL Inpatient CMS G-Code Modifier : CK (09/17/20 1519)    Goals  Short term goals  Time Frame for Short term goals: By discharge, pt will  Short term goal 1: demo I in UE/LE ADLs  Short term goal 2: demo I in functional mobility/transfers with good safety awareness  Short term goal 3: demo WFL fine motor coordination in R hand during ADLs/functional activities  Short term goal 4: demo 8+ min dynamic standing during ADLs/functional activities  Short term goal 5: demo understanding of and I in safety awareness, fall prevention and ECWS during ADLs/ functional activities       Therapy Time   Individual Concurrent Group Co-treatment   Time In 1319         Time Out 1358         Minutes 39         Timed Code Treatment Minutes: 38 Minutes   See above for LOF. RN reports patient is medically stable for therapy treatment this date. Chart reviewed prior to treatment and patient is agreeable for therapy. All lines intact and patient positioned comfortably at end of treatment.   All patient needs addressed prior to ending therapy session.       Narcisa Falk, OTS

## 2020-09-17 NOTE — H&P
[facility]   [address] [place] [address]    HISTORY AND PHYSICAL EXAMINATION            Date:   9/17/2020  Patient name:  Heidi Almanzar  Date of admission:  9/16/2020  8:09 PM  MRN:   [id number]  Account:  [de-identified]  YOB: 1955  PCP:    ASH Desai CNP  Room:   23/23  Code Status:    Prior    Chief Complaint:     Chief Complaint   Patient presents with    Cerebrovascular Accident   Numbness and tingling right-sided arm and leg    History Obtained From:     patient    History of Present Illness:    The patient is a 60 y.o. female who presents with complaint of numbness and tingling on the right side of her arm and leg, patient was gardening around 7:00 PM, when the patient  noticed that she was not acting herself, she noticed that she had also some weakness on the right side, no facial droop or slurred speech, patient has a history of CVA with loop recorder in place, her old CVA affected her left side and fully recovered with little residual effects,      LKW: 7:00 PM,  TPA: Not a candidate for TPA, NIH 1, no disabling symptoms  Endovascular:   No LVO  CT WO contrast: Unremarkable  -CTA head and neck: Mild left common carotid artery lateral wall irregularity atherosclerotic plaque versus clot which extends centrally within the lumen causing approximately 60% arterial stenosis over a segment measuring 1.5 cm, otherwise unremarkable  NIHSS: 1 for sensory loss on the right side,  Anticoagulation: No anticoagulation  LDL: Ordered  A1C: Ordered      Past Medical History:     Past Medical History:   Diagnosis Date    Breast cancer ([address]     Depression     HTN (hypertension)     Hyperlipidemia     Ovarian cyst, bilateral     Pancreatitis     Renal stone         Past Surgical History:     Past Surgical History:   Procedure Laterality Date    APPENDECTOMY      BREAST LUMPECTOMY      bilateral breasts    BREAST SURGERY      INGUINAL HERNIA REPAIR chest pain, palpitations   GASTROINTESTINAL: negative for nausea, vomiting   GENITOURINARY: negative for incontinence   MUSCULOSKELETAL: negative for neck or back pain   NEUROLOGICAL: negative for seizures   PSYCHIATRIC: negative for fatigue      Review of systems otherwise negative.       Physical Exam:   BP (!) 143/81   Pulse 72   Temp 98.6 °F (37 °C) (Oral)   Resp 12   Ht 4' 11\" (1.499 m)   Wt 110 lb (49.9 kg)   SpO2 97%   BMI 22.22 kg/m²   Temp (24hrs), Av.6 °F (37 °C), Min:98.6 °F (37 °C), Max:98.6 °F (37 °C)    No results for input(s): POCGLU in the last 72 hours. No intake or output data in the 24 hours ending 20 0020      CONSTITUTIONAL:  Well developed, well nourished, alert and oriented x 3, in no acute distress. GCS 15, nontoxic. No dysarthria, no aphasia. EOMI. HEAD:  normocephalic, atraumatic    EYES:  PERRLA, EOMI.   ENT:  moist mucous membranes   NECK:  supple, symmetric, no midline tenderness to palpation    BACK:  without midline tenderness, step-offs or deformities    LUNGS:  Equal air entry bilaterally   CARDIOVASCULAR:  normal s1 / s2   ABDOMEN:  Soft, no rigidity   NEUROLOGIC:  Mental Status:  A & O x3,awake             Cranial Nerves:    cranial nerves II-XII are grossly intact     Motor Exam:    Drift:  absent  Tone:  normal     Motor exam is symmetrical 5 out of 5 all extremities bilaterally     Sensory:    Touch:    Right Upper Extremity:  Loss of sensation  Left Upper Extremity:  normal  Right Lower Extremity:  abnormal - loss of sensation   Left Lower Extremity:  normal     Deep Tendon Reflexes:    Right Bicep:  2+  Left Bicep:  2+  Right Knee:  2+  Left Knee:  2+     Plantar Response:  Right:  equivocal  Left:  equivocal     Clonus:  N/A  Walsh's:  N/A     Coordination/Dysmetria:  Heel to Shin:  Right:  normal  Left:  normal  Finger to Nose:   Right:  normal  Left:  normal   Dysdiadochokinesia:  N/A     Gait:  Not assessed     INITIAL NIH STROKE SCALE:           1a. Level of consciousness:  0 - alert; keenly responsive  1b. Level of consciousness questions:  0 - answers both questions correctly  1c. Level of consciousness questions:  0 - performs both tasks correctly  2. Best Gaze:  0 - normal  3. Visual:  0 - no visual loss  4. Facial Palsy:  0 - normal symmetric movement  5a. Motor left arm:  0 - no drift, limb holds 90 (or 45) degrees for full 10 seconds  5b. Motor right arm:  0 - no drift, limb holds 90 (or 45) degrees for full 10 seconds  6a. Motor left le - no drift; leg holds 30 degree position for full 5 seconds  6b. Motor right le - no drift; leg holds 30 degree position for full 5 seconds  7. Limb Ataxia:  0 - absent  8. Sensory:  1 - mild to moderate sensory loss; patient feels pinprick is less sharp or is dull on the affected side; there is a loss of superficial pain with pinprick but patient is aware of being touched   9. Best Language:  0 - no aphasia, normal  10. Dysarthria:  0 - normal  11.   Extinction and Inattention:  0 - no abnormality     TOTAL:  1      SKIN:  no rash           Investigations:      Laboratory Testing:  Recent Results (from the past 24 hour(s))   STROKE PANEL    Collection Time: 20  8:25 PM   Result Value Ref Range    Glucose 431 (HH) 70 - 99 mg/dL    BUN 21 8 - 23 mg/dL    CREATININE 0.75 0.50 - 0.90 mg/dL    Bun/Cre Ratio NOT REPORTED 9 - 20    Calcium 9.5 8.6 - 10.4 mg/dL    Sodium 133 (L) 135 - 144 mmol/L    Potassium 4.1 3.7 - 5.3 mmol/L    Chloride 97 (L) 98 - 107 mmol/L    CO2 22 20 - 31 mmol/L    Anion Gap 14 9 - 17 mmol/L    GFR Non-African American >60 >60 mL/min    GFR African American >60 >60 mL/min    GFR Comment          GFR Staging NOT REPORTED     WBC 8.7 3.5 - 11.3 k/uL    RBC 4.45 3.95 - 5.11 m/uL    Hemoglobin 13.8 11.9 - 15.1 g/dL    Hematocrit 41.5 36.3 - 47.1 %    MCV 93.3 82.6 - 102.9 fL    MCH 31.0 25.2 - 33.5 pg    MCHC 33.3 28.4 - 34.8 g/dL    RDW 12.0 11.8 - 14.4 % Platelets See Reflexed IPF Result 138 - 453 k/uL    MPV NOT REPORTED 8.1 - 13.5 fL    NRBC Automated 0.0 0.0 per 100 WBC    Total CK 63 26 - 192 U/L    CK-MB 2.3 <5.4 ng/mL    % CKMB 3.7 (H) 0.0 - 3.0 %    CKMB Interpretation NORMAL ISOENZYME PATTERN     Differential Type NOT REPORTED     WBC Morphology NOT REPORTED     RBC Morphology NOT REPORTED     Platelet Estimate NOT REPORTED     Seg Neutrophils 66 (H) 36 - 65 %    Lymphocytes 25 24 - 43 %    Monocytes 6 3 - 12 %    Eosinophils % 2 1 - 4 %    Basophils 0 0 - 2 %    Immature Granulocytes 1 (H) 0 %    Segs Absolute 5.73 1.50 - 8.10 k/uL    Absolute Lymph # 2.16 1.10 - 3.70 k/uL    Absolute Mono # 0.54 0.10 - 1.20 k/uL    Absolute Eos # 0.18 0.00 - 0.44 k/uL    Basophils Absolute 0.03 0.00 - 0.20 k/uL    Absolute Immature Granulocyte 0.07 0.00 - 0.30 k/uL    Myoglobin <21 (L) 25 - 58 ng/mL    Protime  sec     Unable to perform testing: Specimen clotted. SANJEEV White NOTIFIED    INR       Unable to perform testing: Specimen clotted. SANJEEV GODOY NOTIFIED    PTT  sec     Unable to perform testing: Specimen clotted. SANJEEV White NOTIFIED    Troponin, High Sensitivity 6 0 - 14 ng/L    Troponin T NOT REPORTED <0.03 ng/mL    Troponin Interp NOT REPORTED    Immature Platelet Fraction    Collection Time: 09/16/20  8:25 PM   Result Value Ref Range    Platelet, Immature Fraction NOT REPORTED 1.1 - 10.3 %    Platelet, Fluorescence Platelet clumps present, count appears adequate. 138 - 453 k/uL   SPECIMEN REJECTION    Collection Time: 09/16/20  8:25 PM   Result Value Ref Range    Specimen Source . BLOOD     Ordered Test PT,PTT     Reason for Rejection Unable to perform testing: Specimen clotted.      - NOT REPORTED    Protime-INR    Collection Time: 09/16/20  9:51 PM   Result Value Ref Range    Protime 9.4 9.0 - 12.0 sec    INR 0.9    APTT    Collection Time: 09/16/20  9:51 PM   Result Value Ref Range    PTT 19.7 (L) 20.5 - 30.5 sec       Imaging/Diagnostics:  Ct Head Wo Contrast    Result Date: 9/16/2020  EXAMINATION: CT OF THE HEAD WITHOUT CONTRAST  9/16/2020 7:47 pm TECHNIQUE: CT of the head was performed without the administration of intravenous contrast. Dose modulation, iterative reconstruction, and/or weight based adjustment of the mA/kV was utilized to reduce the radiation dose to as low as reasonably achievable. COMPARISON: 04/25/2018 HISTORY: ORDERING SYSTEM PROVIDED HISTORY: Stroke TECHNOLOGIST PROVIDED HISTORY: Stroke FINDINGS: Examination is degraded by motion. BRAIN/VENTRICLES: There is no acute intracranial hemorrhage, mass effect or midline shift. No abnormal extra-axial fluid collection. There is no evidence of hydrocephalus. There is hypoattenuation and loss of gray-white differentiation within the right parieto-occipital lobe. This is in the area of previously seen infarction, but is larger/more extensive. ORBITS: The visualized portion of the orbits demonstrate no acute abnormality. SINUSES: The visualized paranasal sinuses and mastoid air cells demonstrate no acute abnormality. SOFT TISSUES/SKULL:  No acute abnormality of the visualized skull or soft tissues. Age-indeterminate right posterior MCA infarction. No acute intracranial hemorrhage. Critical results were called by Dr. Yvette Avery MD to [facility] on 9/16/2020 at 20:33. Xr Chest Portable    Result Date: 9/16/2020  EXAMINATION: ONE XRAY VIEW OF THE CHEST 9/16/2020 9:33 pm COMPARISON: 04/25/2018 HISTORY: ORDERING SYSTEM PROVIDED HISTORY: CVA TECHNOLOGIST PROVIDED HISTORY: CVA Reason for Exam: portable upright/ stroke alert Acuity: Acute Type of Exam: Initial FINDINGS: Cardiomediastinal silhouette is not significantly changed in size. Loop recorder is noted. No pulmonary consolidation, pleural effusion, or pneumothorax. No acute osseous abnormality. Suspected hiatal hernia. Surgical clips in the left axilla. No acute cardiopulmonary abnormality.      Cta Head Neck W Contrast    Result Date: 9/16/2020  EXAMINATION: CTA OF THE HEAD AND NECK WITH CONTRAST 9/16/2020 8:26 pm: TECHNIQUE: CTA of the head and neck was performed with the administration of intravenous contrast. Multiplanar reformatted images are provided for review. MIP images are provided for review. Stenosis of the internal carotid arteries measured using NASCET criteria. Dose modulation, iterative reconstruction, and/or weight based adjustment of the mA/kV was utilized to reduce the radiation dose to as low as reasonably achievable. COMPARISON: None. HISTORY: ORDERING SYSTEM PROVIDED HISTORY: CVA TECHNOLOGIST PROVIDED HISTORY: CVA Reason for Exam: CVA Acuity: Acute Type of Exam: Initial FINDINGS: CTA NECK: AORTIC ARCH/ARCH VESSELS: No dissection or arterial injury. No significant stenosis of the brachiocephalic or subclavian arteries. CAROTID ARTERIES: Mid left common carotid artery lateral wall atherosclerotic plaque versus clot is noted which extends into the lumen causing approximately 60% arterial stenosis. Bilateral carotid bifurcation internal and external carotid arteries are patent and otherwise unremarkable in appearance. No evidence of carotid dissection is identified. VERTEBRAL ARTERIES: No dissection, arterial injury, or significant stenosis. SOFT TISSUES: The lung apices are clear. No cervical or superior mediastinal lymphadenopathy. The larynx and pharynx are unremarkable. No acute abnormality of the salivary and thyroid glands. BONES: No acute osseous abnormality. CTA HEAD: ANTERIOR CIRCULATION: No significant stenosis of the intracranial internal carotid, anterior cerebral, or middle cerebral arteries. No aneurysm. POSTERIOR CIRCULATION: No significant stenosis of the vertebral, basilar, or posterior cerebral arteries. No aneurysm. OTHER: No dural venous sinus thrombosis on this non-dedicated study. BRAIN: No mass effect or midline shift. No extra-axial fluid collection.  The gray-white differentiation is common carotid artery lateral wall irregularity atherosclerotic plaque versus clot which extends centrally within the lumen causing approximately 60% arterial stenosis over a segment measuring 1.5 cm, otherwise unremarkable              - MRI Brain WO[de-identified] ordered               - ECHO: ordered                  Medications              -Loaded with aspirin 325 and Plavix 300              -Maintenance dose of aspirin 81              -Maintenance dose of Plavix 75 mg for 21 days              -Heparin drip low intensity PTT goal 50-70              - Folic acid 1mg BID              - Lipitor 80mg nightly                  Labs                 - A1C, Fasting Lipid panel              Orders              - PT, OT, Speech eval, PMR c/s              - Telemetry               - Neuro checks per protocol  - We recommend SBP <200  - Blood glucose goal less than 180  - Please avoid dextrose containing solutions        Consultations:   IP CONSULT TO STROKE TEAM  IP CONSULT TO HOSPITALIST     Patient is admitted as inpatient status because of co-morbidities listed above, severity of signs and symptoms as outlined, requirement for current medical therapies and most importantly because of direct risk to patient if care not provided in a hospital setting.     Delilah Weir MD  Neurology Resident PGY-2  9/17/2020 at 12:20 AM

## 2020-09-17 NOTE — FLOWSHEET NOTE
[facility] CARE DEPARTMENT - [address]     Emergency/Trauma Note    PATIENT NAME: Mamie Smith    Shift date: 9.16.2020  Shift day: Wednesday   Shift # 2    Room # 3942/5595-90   Name: Mamie Smith            Age: 60 y.o. Gender: female          [place] [place]: [address] [facility]: unknown    Trauma/Incident type: Stroke Alert  Admit Date & Time: 9/16/2020  8:09 PM  TRAUMA NAME: None        PATIENT/EVENT DESCRIPTION:  Mamie Smith is a 60 y.o. female who arrived as a STROKE ALERT for stroke-like symptoms. Pt to be admitted to [id number]/9233-90. SPIRITUAL ASSESSMENT/INTERVENTION:  Patient appeared to be tired and somewhat anxious. States that she has a fiance and daughter for support. Daughter has been bedside with patient. Patient states that daughter is scared for her mother and the condition. Patient was receptive to both spiritual care and prayer.  prayed and provided space for patient to express feelings, thoughts, and concerns. PATIENT BELONGINGS:  No belongings noted    ANY BELONGINGS OF SIGNIFICANT VALUE NOTED:  None    REGISTRATION STAFF NOTIFIED? Yes      WHAT IS YOUR SPIRITUAL CARE PLAN FOR THIS PATIENT?:  Chaplains will remain available to offer spiritual and emotional support as needed. Electronically signed by Doe Chaudhari on 9/17/2020 at 1:28 AM.  Ben Santizo  [phone]       09/16/20 2040   Encounter Summary   Services provided to: Patient   Referral/Consult From: Multi-disciplinary team   Support System Children;Significant other   Continue Visiting   ([phone])   Complexity of Encounter Moderate   Length of Encounter 30 minutes   Spiritual Assessment Completed Yes   Crisis   Type Stroke Alert   Assessment Fearful; Anxious   Intervention Active listening;Explored feelings, thoughts, concerns;Explored coping resources; Discussed illness/injury and it's impact   Outcome Expressed gratitude;Engaged in conversation;Expressed feelings/needs/concerns     Electronically signed by Timmy Clemente on 9/17/2020 at 1:28 AM

## 2020-09-18 LAB
ABSOLUTE EOS #: 0.08 K/UL (ref 0–0.44)
ABSOLUTE IMMATURE GRANULOCYTE: 0.04 K/UL (ref 0–0.3)
ABSOLUTE LYMPH #: 1.68 K/UL (ref 1.1–3.7)
ABSOLUTE MONO #: 0.51 K/UL (ref 0.1–1.2)
ANION GAP SERPL CALCULATED.3IONS-SCNC: 11 MMOL/L (ref 9–17)
BASOPHILS # BLD: 0 % (ref 0–2)
BASOPHILS ABSOLUTE: 0.03 K/UL (ref 0–0.2)
BUN BLDV-MCNC: 9 MG/DL (ref 8–23)
BUN/CREAT BLD: ABNORMAL (ref 9–20)
CALCIUM SERPL-MCNC: 8 MG/DL (ref 8.6–10.4)
CHLORIDE BLD-SCNC: 100 MMOL/L (ref 98–107)
CO2: 19 MMOL/L (ref 20–31)
CREAT SERPL-MCNC: 0.43 MG/DL (ref 0.5–0.9)
DIFFERENTIAL TYPE: ABNORMAL
EKG ATRIAL RATE: 90 BPM
EKG P AXIS: 35 DEGREES
EKG P-R INTERVAL: 144 MS
EKG Q-T INTERVAL: 378 MS
EKG QRS DURATION: 86 MS
EKG QTC CALCULATION (BAZETT): 462 MS
EKG R AXIS: -39 DEGREES
EKG T AXIS: 35 DEGREES
EKG VENTRICULAR RATE: 90 BPM
EOSINOPHILS RELATIVE PERCENT: 1 % (ref 1–4)
GFR AFRICAN AMERICAN: >60 ML/MIN
GFR NON-AFRICAN AMERICAN: >60 ML/MIN
GFR SERPL CREATININE-BSD FRML MDRD: ABNORMAL ML/MIN/{1.73_M2}
GFR SERPL CREATININE-BSD FRML MDRD: ABNORMAL ML/MIN/{1.73_M2}
GLUCOSE BLD-MCNC: 156 MG/DL (ref 65–105)
GLUCOSE BLD-MCNC: 236 MG/DL (ref 65–105)
GLUCOSE BLD-MCNC: 240 MG/DL (ref 70–99)
HCT VFR BLD CALC: 40.1 % (ref 36.3–47.1)
HEMOGLOBIN: 13.1 G/DL (ref 11.9–15.1)
IMMATURE GRANULOCYTES: 1 %
LYMPHOCYTES # BLD: 20 % (ref 24–43)
MCH RBC QN AUTO: 31.4 PG (ref 25.2–33.5)
MCHC RBC AUTO-ENTMCNC: 32.7 G/DL (ref 28.4–34.8)
MCV RBC AUTO: 96.2 FL (ref 82.6–102.9)
MONOCYTES # BLD: 6 % (ref 3–12)
NRBC AUTOMATED: 0 PER 100 WBC
PARTIAL THROMBOPLASTIN TIME: 48.7 SEC (ref 20.5–30.5)
PARTIAL THROMBOPLASTIN TIME: 54.6 SEC (ref 20.5–30.5)
PARTIAL THROMBOPLASTIN TIME: 59.2 SEC (ref 20.5–30.5)
PARTIAL THROMBOPLASTIN TIME: 72.5 SEC (ref 20.5–30.5)
PDW BLD-RTO: 12 % (ref 11.8–14.4)
PLATELET # BLD: 267 K/UL (ref 138–453)
PLATELET ESTIMATE: ABNORMAL
PMV BLD AUTO: 10.1 FL (ref 8.1–13.5)
POTASSIUM SERPL-SCNC: 3.7 MMOL/L (ref 3.7–5.3)
RBC # BLD: 4.17 M/UL (ref 3.95–5.11)
RBC # BLD: ABNORMAL 10*6/UL
SEG NEUTROPHILS: 72 % (ref 36–65)
SEGMENTED NEUTROPHILS ABSOLUTE COUNT: 5.99 K/UL (ref 1.5–8.1)
SODIUM BLD-SCNC: 130 MMOL/L (ref 135–144)
SODIUM BLD-SCNC: 132 MMOL/L (ref 135–144)
WBC # BLD: 8.3 K/UL (ref 3.5–11.3)
WBC # BLD: ABNORMAL 10*3/UL

## 2020-09-18 PROCEDURE — 6370000000 HC RX 637 (ALT 250 FOR IP): Performed by: NURSE PRACTITIONER

## 2020-09-18 PROCEDURE — 6370000000 HC RX 637 (ALT 250 FOR IP): Performed by: STUDENT IN AN ORGANIZED HEALTH CARE EDUCATION/TRAINING PROGRAM

## 2020-09-18 PROCEDURE — 99223 1ST HOSP IP/OBS HIGH 75: CPT | Performed by: PSYCHIATRY & NEUROLOGY

## 2020-09-18 PROCEDURE — 82947 ASSAY GLUCOSE BLOOD QUANT: CPT

## 2020-09-18 PROCEDURE — 2000000003 HC NEURO ICU R&B

## 2020-09-18 PROCEDURE — 6360000002 HC RX W HCPCS: Performed by: NURSE PRACTITIONER

## 2020-09-18 PROCEDURE — 85730 THROMBOPLASTIN TIME PARTIAL: CPT

## 2020-09-18 PROCEDURE — APPNB45 APP NON BILLABLE 31-45 MINUTES: Performed by: NURSE PRACTITIONER

## 2020-09-18 PROCEDURE — 97162 PT EVAL MOD COMPLEX 30 MIN: CPT

## 2020-09-18 PROCEDURE — 97530 THERAPEUTIC ACTIVITIES: CPT

## 2020-09-18 PROCEDURE — 85025 COMPLETE CBC W/AUTO DIFF WBC: CPT

## 2020-09-18 PROCEDURE — 80048 BASIC METABOLIC PNL TOTAL CA: CPT

## 2020-09-18 PROCEDURE — 84295 ASSAY OF SERUM SODIUM: CPT

## 2020-09-18 PROCEDURE — 36415 COLL VENOUS BLD VENIPUNCTURE: CPT

## 2020-09-18 PROCEDURE — 99233 SBSQ HOSP IP/OBS HIGH 50: CPT | Performed by: PSYCHIATRY & NEUROLOGY

## 2020-09-18 PROCEDURE — 2580000003 HC RX 258: Performed by: STUDENT IN AN ORGANIZED HEALTH CARE EDUCATION/TRAINING PROGRAM

## 2020-09-18 PROCEDURE — 93010 ELECTROCARDIOGRAM REPORT: CPT | Performed by: INTERNAL MEDICINE

## 2020-09-18 RX ADMIN — CLOPIDOGREL 75 MG: 75 TABLET, FILM COATED ORAL at 08:56

## 2020-09-18 RX ADMIN — INSULIN LISPRO 6 UNITS: 100 INJECTION, SOLUTION INTRAVENOUS; SUBCUTANEOUS at 18:53

## 2020-09-18 RX ADMIN — Medication 81 MG: at 08:56

## 2020-09-18 RX ADMIN — INSULIN LISPRO 3 UNITS: 100 INJECTION, SOLUTION INTRAVENOUS; SUBCUTANEOUS at 21:06

## 2020-09-18 RX ADMIN — INSULIN LISPRO 6 UNITS: 100 INJECTION, SOLUTION INTRAVENOUS; SUBCUTANEOUS at 09:00

## 2020-09-18 RX ADMIN — HEPARIN SODIUM AND DEXTROSE 20 UNITS/KG/HR: 10000; 5 INJECTION INTRAVENOUS at 02:53

## 2020-09-18 RX ADMIN — SODIUM CHLORIDE, PRESERVATIVE FREE 10 ML: 5 INJECTION INTRAVENOUS at 09:00

## 2020-09-18 RX ADMIN — FOLIC ACID 1 MG: 1 TABLET ORAL at 08:57

## 2020-09-18 RX ADMIN — MULTIPLE VITAMINS W/ MINERALS TAB 1 TABLET: TAB at 08:56

## 2020-09-18 RX ADMIN — CITALOPRAM HYDROBROMIDE 20 MG: 20 TABLET ORAL at 08:56

## 2020-09-18 RX ADMIN — SODIUM CHLORIDE, PRESERVATIVE FREE 10 ML: 5 INJECTION INTRAVENOUS at 21:03

## 2020-09-18 RX ADMIN — ATORVASTATIN CALCIUM 80 MG: 80 TABLET, FILM COATED ORAL at 21:03

## 2020-09-18 RX ADMIN — BUTALBITAL, ACETAMINOPHEN AND CAFFEINE 1 TABLET: 50; 325; 40 TABLET ORAL at 18:11

## 2020-09-18 ASSESSMENT — PAIN SCALES - GENERAL
PAINLEVEL_OUTOF10: 0
PAINLEVEL_OUTOF10: 0
PAINLEVEL_OUTOF10: 8
PAINLEVEL_OUTOF10: 0
PAINLEVEL_OUTOF10: 0

## 2020-09-18 NOTE — PLAN OF CARE
TODAY:  9/18/2020    AWAKE & FOLLOWING COMMANDS:  [] No   [x] Yes    INTUBATED:   [x] No   [] Yes    SEDATION/ANALGESIA:    [] Propofol gtt  [] Versed gtt  [] Ativan gtt   [x] No Sedation  Pain medications:       FEEDING: Able to take PO?  [] No:  [] NPO for:  [] NG/OG [] PEG  Tube Feeds:      [x] Yes:  Diet: Carb control  DVT Prophylaxis:  [x] Yes:   Aspirin, Heparin, plavix   [] No rationale:     Stress Ulcer Prophylaxis: [] Yes:   [x] Not indicated    VASOPRESSORS:  [x] No    [] Yes  [] Levophed [] Dopamine [] Vasopressin  [] Dobutamine [] Phenylephrine [] Epinephrine    CENTRAL/ARTERIAL LINES:  [x] No    [] Yes:  Location: , Date placed: , Indication:     ALBERTO CATHETER: [x] No    [] Yes:  Location: , Date placed: , Indication:     DRAINS: [x] No    [] Yes:  Location: , Date placed: , Output:     Head of Bed: [x] Elevated:          [] Flat    Glucose management: [] Not indicated, consistently less than 180 [x] Sliding Scale : High dose           [] Long Acting:    Secondary Stroke PPX: [] Antiplatelet:  ASA/plavix              [] Statin:    Jamaal Mendez DO, PGY-2  9/18/2020     10:25 AM

## 2020-09-18 NOTE — PROGRESS NOTES
ENDOVASCULAR NEUROSURGERY PROGRESS NOTE  9/18/2020 6:38 AM  Subjective:   Admit Date: 9/16/2020  PCP: ASH Desai - CNP    No acute events overnight. Neuro exam stable. Objective:   Vitals: /88   Pulse 78   Temp 98.2 °F (36.8 °C)   Resp 21   Ht 4' 11\" (1.499 m)   Wt 121 lb 4.1 oz (55 kg)   SpO2 96%   BMI 24.49 kg/m²   General appearance: NAD. HEENT: Atraumatic. Neck: Neck is supple. Lungs: No respiratory distress noted. Heart: normal sinus rhythm on tele. .   Abdomen: Soft nontender. Extremities: No lower limb edema noted. Neurologic:awake, following commands, no dysarthria  CN: Has intact extraocular muscles movements, no facial droop noted, no decreased sensation on R face and arm today  MOTOR: Has good strength in both upper and lower extremities, moving both upper and lower extremities against gravity with no drift. SENSORY: Normal sensation in both upper and lower extremities.     Medications and labs:   Scheduled Meds:   aspirin  81 mg Oral Daily    citalopram  20 mg Oral Daily    [Held by provider] metoprolol tartrate  50 mg Oral Daily    [Held by provider] lisinopril-hydroCHLOROthiazide  1 tablet Oral Daily    therapeutic multivitamin-minerals  1 tablet Oral Daily    sodium chloride flush  10 mL Intravenous 2 times per day    clopidogrel  75 mg Oral Daily    atorvastatin  80 mg Oral Nightly    folic acid  1 mg Oral Daily    insulin lispro  0-9 Units Subcutaneous Nightly    insulin lispro  0-18 Units Subcutaneous TID WC     Continuous Infusions:   sodium chloride 100 mL/hr at 09/17/20 0200    dextrose      heparin (porcine) 20 Units/kg/hr (09/18/20 0253)     CBC:   Recent Labs     09/16/20 2025 09/17/20  0116 09/17/20  0719   WBC 8.7 9.0 8.2   HGB 13.8 12.1 12.9   PLT See Reflexed IPF Result 271 286     BMP:    Recent Labs     09/16/20 2021 09/16/20 2025 09/17/20 0719   NA  --  133* 139   K  --  4.1 3.7   CL  --  97* 108*   CO2  --  22 19*   BUN  --  21 11 CREATININE 0.58 0.75 0.46*   GLUCOSE  --  431* 165*     Hepatic: No results for input(s): AST, ALT, ALB, BILITOT, ALKPHOS in the last 72 hours. Troponin: No results for input(s): TROPONINI in the last 72 hours. BNP: No results for input(s): BNP in the last 72 hours. Lipids:   Recent Labs     09/17/20  0719   CHOL 162   HDL 69     INR:   Recent Labs     09/16/20 2025 09/16/20  2151   INR Unable to perform testing: Specimen clotted. RN WALT NOTIFIED 0.9       Assessment and Recommendations:     70-year-old female who is -American with a past medical history including hypertension, depression, hyperlipidemia and history of breast cancer. She had prior history of right MCA/parietal ischemic stroke. She is on aspirin 81 mg daily at home. She presented with acute onset right hand numbness. NIH stroke scale at 1. No TPA given. Vessel images with left common carotid artery clot noted. She was loaded with Plavix 300 mg, continued on aspirin 81 mg daily and started on heparin drip. This morning exam is stable. She no longer tests as decreased sensation of R UE and R face    LDL at 76, HDL at 69, total cholesterol at 162. Transthoracic Echocardiography Report (TTE) 9/17/2020  Global left ventricular systolic function is normal.Estimated ejection   fraction is 50%. Mild inferior wall hypokinesis. Grade I (mild) left ventricular diastolic dysfunction. Normal right ventricular size and function. Calcified mitral valve leaflets with moderate regurgitation. Estimated right ventricular systolic pressure is 68GBDS. No pericardial effusion seen. 1. Close monitoring for neurological status. Consider moving the patient to neuro ICU for close monitoring. 2. Continue heparin drip. 3. CTA neck in 3 days to reevaluate left common carotid clot. 4. Continue dual antiplatelet therapy with aspirin Plavix. 5. Statin therapy. 6. IV hydration. 7. Transthoracic echo.     Note written by Sheri PEREZ and reviewed/edited appropriately by Everett Khalil. Patient was seen and examined this morning.     Oswald Vazquez MD  Stroke, [facility]  [address] [place]  Electronically signed 9/18/2020 at 6:38 AM

## 2020-09-18 NOTE — PLAN OF CARE
Problem: Falls - Risk of:  Goal: Will remain free from falls  Description: Will remain free from falls  Outcome: Ongoing     Problem: Falls - Risk of:  Goal: Absence of physical injury  Description: Absence of physical injury  Outcome: Ongoing     Problem: HEMODYNAMIC STATUS  Goal: Patient has stable vital signs and fluid balance  Outcome: Ongoing     Problem: ACTIVITY INTOLERANCE/IMPAIRED MOBILITY  Goal: Mobility/activity is maintained at optimum level for patient  Outcome: Ongoing     Problem: COMMUNICATION IMPAIRMENT  Goal: Ability to express needs and understand communication  Outcome: Ongoing     Problem: Musculor/Skeletal Functional Status  Goal: Highest potential functional level  Outcome: Ongoing     Problem: Musculor/Skeletal Functional Status  Goal: Absence of falls  Outcome: Ongoing

## 2020-09-18 NOTE — PROGRESS NOTES
Physical Therapy    Facility/Department: [address]  Initial Assessment    NAME: Marianna Camarillo  : 1955  MRN: [id number]  Chief Complaint   Patient presents with    Cerebrovascular Accident     Date of Service: 2020    Discharge Recommendations:    Further therapy recommended at discharge for higher level balance, endurance, and strengthening treatments. PT Equipment Recommendations  Equipment Needed: No    Assessment   Body structures, Functions, Activity limitations: Decreased functional mobility ; Decreased strength;Decreased endurance;Decreased balance  Assessment: Pt amb 100ft w CGA and no AD. Pt limited by fatigue. Pt would benefit from continued PT services to address dunctional and endurance deficits. Pt would benefit from physical assistance with mobility at discharge based on today's demonstrated mobility. Prognosis: Good  Decision Making: Medium Complexity  REQUIRES PT FOLLOW UP: Yes  Activity Tolerance  Activity Tolerance: Patient limited by fatigue       Patient Diagnosis(es): The encounter diagnosis was Cerebrovascular accident (CVA), unspecified mechanism ([facility] [address] has a past medical history of Breast cancer ([facility] [address] Depression, HTN (hypertension), Hyperlipidemia, Ovarian cyst, bilateral, Pancreatitis, and Renal stone. has a past surgical history that includes Inguinal hernia repair; Appendectomy; lymphadenectomy (Bilateral); Tunneled venous port placement; Breast lumpectomy; and Breast surgery. Restrictions  Restrictions/Precautions  Restrictions/Precautions: General Precautions, Up as Tolerated, Fall Risk  Required Braces or Orthoses?: No  Vision/Hearing  Vision: Impaired  Vision Exceptions: Wears glasses for distance  Hearing: Within functional limits     Subjective  General  Patient assessed for rehabilitation services?: Yes  Family / Caregiver Present: No  Follows Commands: Within Functional Limits  Subjective  Subjective: RN and pt in agreement to eval/treat.  Pt supine in bed upon arrival. Pt pleasant and cooperative throughout.   Pain Screening  Patient Currently in Pain: No  Pain Assessment  Pain Level: 0  Vital Signs  Patient Currently in Pain: No       Orientation  Orientation  Overall Orientation Status: Within Functional Limits  Social/Functional History  Social/Functional History  Lives With: Alone  Type of Home: (2nd floor of [facility])  Home Layout: Performs ADL's on one level, Two level(Will likely live with friend on first floor until comfortable with stairs)  Home Access: Stairs to enter with rails  Entrance Stairs - Number of Steps: 15 GEORGE  Entrance Stairs - Rails: None  Bathroom Shower/Tub: Tub/Shower unit  Bathroom Toilet: Standard  Bathroom Equipment: Grab bars in shower, Shower chair  Home Equipment: Cane(pt reports does not use cane)  ADL Assistance: Independent  Homemaking Assistance: Independent  Homemaking Responsibilities: Yes(pt reports cooks, cleans, does laundry)  Ambulation Assistance: Independent(no device)  Transfer Assistance: Independent  Active : Yes  Mode of Transportation: Car  Occupation: On disability  Leisure & Hobbies: movies, walk dog, go to park  Additional Comments: pt has 1 small dog, pt reports friend lives in [place] [facility], always home, can provide A if needed  Cognition   Cognition  Overall Cognitive Status: WFL    Objective     Observation/Palpation  Posture: Good    Joint Mobility  ROM RLE: WFL  ROM LLE: WFL  ROM RUE: WFL  ROM LUE: WFL  Strength RLE  Strength RLE: Exception  R Hip Flexion: 4-/5  R Knee Flexion: 4/5  R Knee Extension: 4/5  R Ankle Dorsiflexion: 4+/5  R Ankle Plantar flexion: 4+/5  Strength LLE  Strength LLE: Exception  L Hip Flexion: 4-/5  L Knee Flexion: 4/5  L Knee Extension: 4/5  L Ankle Dorsiflexion: 4+/5  L Ankle Plantar Flexion: 4+/5  Strength RUE  Strength RUE: WFL  Strength LUE  Strength LUE: WFL  Strength Other  Other: No differences noted R vs L  Tone RLE  RLE Tone: Normotonic  Tone LLE  LLE Tone: Normotonic  Motor Control  Gross Motor?: WFL  Sensation  Overall Sensation Status: Impaired(pt reports numbness/tingling in R hand)  Bed mobility  Supine to Sit: Independent  Sit to Supine: Independent  Scooting: Independent  Comment: HOB elevated  Transfers  Sit to Stand: Contact guard assistance  Stand to sit: Contact guard assistance  Comment: CGA for safety. No LOB noted. Performed w/o AD  Ambulation  Ambulation?: Yes  More Ambulation?: No  Ambulation 1  Surface: level tile  Device: No Device  Assistance: Contact guard assistance  Gait Deviations: Slow Elda;Decreased step length;Decreased step height  Distance: 100 ft  Comments: Pt required increased time to ambulate, reports gait is slower than baseline d/t fatigue  Stairs/Curb  Stairs?: No(did not formally assess d/t fatigue)     Balance  Posture: Good  Sitting - Static: Good  Sitting - Dynamic: Good  Standing - Static: Fair;+  Standing - Dynamic: Fair;+  Comments: Standing balance assessed w/o AD        Plan   Plan  Times per week: 5x/wk  Times per day: Daily  Current Treatment Recommendations: Strengthening, Balance Training, Functional Mobility Training, Transfer Training, Endurance Training, Home Exercise Program, Safety Education & Training, Patient/Caregiver Education & Training, Gait Training, Stair training  Safety Devices  Type of devices:  All fall risk precautions in place, Call light within reach, Left in bed, Nurse notified, Gait belt  Restraints  Initially in place: No      AM-PAC Score  AM-PAC Inpatient Mobility Raw Score : 22 (09/18/20 0913)  AM-PAC Inpatient T-Scale Score : 53.28 (09/18/20 0913)  Mobility Inpatient CMS 0-100% Score: 20.91 (09/18/20 0913)  Mobility Inpatient CMS G-Code Modifier : Marie Garvey (09/18/20 0913)          Goals  Short term goals  Time Frame for Short term goals: 6 visits  Short term goal 1: Pt to perform bed mobility and functional transfers with independence  Short term goal 2: Pt to ambulate 240 ft SBA and no AD to promote independence  Short term goal 3: Pt to navigate full flight of stairs (~15) w/o HR and SBA to simulate home environment  Short term goal 4: Pt to tolerate 30 min of PT to demo improved endurance/ decreased fatigue  Short term goal 5: Pt to demo good- standing dynamic balance to reduce risk of falls       Therapy Time   Individual Concurrent Group Co-treatment   Time In 0811         Time Out 0833         Minutes 22         Timed Code Treatment Minutes: [address]     Evaluation/treatment performed by Student PT under the supervision of co-signing PT who agrees with all evaluation/treatment and documentation.

## 2020-09-18 NOTE — PROGRESS NOTES
Neuro Critical Care  Daily Progress Note    Patient Name: Eddie Trinidad  Patient : 1955  Room/Bed: 7185/7947-84  Code Status: FULL  Allergies: Allergies   Allergen Reactions    Compazine [Prochlorperazine Maleate]     Prochlorperazine Edisylate     Reglan [Metoclopramide]     Vicodin [Hydrocodone-Acetaminophen]        CHIEF COMPLAINT     Right hand paresthesias    HPI    History Obtained From: Patient, EMR    The patient is a 60 y.o. female with a history of HTN, HLD, breast CA and previous CVA (around 3 years ago) who presented with complaints of right hand paresthesias and ataxia. LKW around 1900. Per records, patient's  noticed patient was not acting like herself and had right sided weakness. Patient reports she was gardening and noticed her right hand wasn't working and felt like it had \"electricity\" running through it. She states she went inside and asked her daughter to call 911 as she had similar symptoms during her last CVA, but on her left side. On arrival to the ED, NIH 1 for sensory. CT Head with no acute abnormality. TPA not administered due to low NIH and no disabling symptoms. CTA Head/Neck showed left common carotid artery lateral wall irregularity, plaque vs clot, causing ~60@ stenosis. Patient was loaded with 300mg Plavix, continued on Aspirin and started on low dose Heparin infusion. She was initially admitted to [facility] under the General Neurology team.  Neuro Critical Care was asked to take over and transfer patient to Neuro ICU for close monitoring as she is on dual antiplatelet therapy and heparin infusion. Of note, patient reports a history of a previous CVA about 3 years ago. She reports her left arm was weak at that time, but has fully recovered. She has a Loop recorder. Last 24h:  MRI Brain yesterday showed scattered infarcts within the left cerebral hemisphere consistent with watershed infarct. No acute events overnight.   Remains on heparin infusion, PTT therapeutic at 54.6 this morning. Clinical exam remains stable, NIH 1 for right hand sensory changes.      Admitted to ICU From: Neuro Stepdown  Reason for ICU Admission: Close monitoring on dual antiplatelet and heparin infusion       PATIENT HISTORY   Past Medical History:        Diagnosis Date    Breast cancer ([address]     Depression     HTN (hypertension)     Hyperlipidemia     Ovarian cyst, bilateral     Pancreatitis     Renal stone        Past Surgical History:        Procedure Laterality Date    APPENDECTOMY      BREAST LUMPECTOMY      bilateral breasts    BREAST SURGERY      INGUINAL HERNIA REPAIR      LYMPHADENECTOMY Bilateral     TUNNELED VENOUS PORT PLACEMENT         Social History:   Social History     Socioeconomic History    Marital status: Single     Spouse name: Not on file    Number of children: Not on file    Years of education: Not on file    Highest education level: Not on file   Occupational History     Employer: NONE   Social Needs    Financial resource strain: Not on file    Food insecurity     Worry: Not on file     Inability: Not on file    Transportation needs     Medical: Not on file     Non-medical: Not on file   Tobacco Use    Smoking status: Never Smoker    Smokeless tobacco: Never Used   Substance and Sexual Activity    Alcohol use: Yes     Comment: rare, USED TO DRINK 2 BOTTLES OF WINE/WK QUIT LAST FRI    Drug use: No    Sexual activity: Yes     Partners: Male   Lifestyle    Physical activity     Days per week: Not on file     Minutes per session: Not on file    Stress: Not on file   Relationships    Social connections     Talks on phone: Not on file     Gets together: Not on file     Attends [employer] service: Not on file     Active member of club or organization: Not on file     Attends meetings of clubs or organizations: Not on file     Relationship status: Not on file    Intimate partner violence     Fear of current or ex partner: Not on file     Emotionally abused: Not on file     Physically abused: Not on file     Forced sexual activity: Not on file   Other Topics Concern    Not on file   Social History Narrative    ** Merged History Encounter **            Family History:       Problem Relation Age of Onset    Coronary Art Dis Mother     Heart Disease Mother     Coronary Art Dis Father     Diabetes Father     High Blood Pressure Father     Coronary Art Dis Sister        Allergies:    Compazine [prochlorperazine maleate]; Prochlorperazine edisylate; Reglan [metoclopramide]; and Vicodin [hydrocodone-acetaminophen]    Medications Prior to Admission:    Medications Prior to Admission: lisinopril-hydrochlorothiazide (PRINZIDE;ZESTORETIC) 20-12.5 MG per tablet, Take 1 tablet by mouth daily  aspirin 81 MG tablet, Take 81 mg by mouth daily  butalbital-APAP-caffeine (FIORICET) -40 MG CAPS per capsule, Take 1 capsule by mouth every 4 hours as needed for Headaches  metoprolol (LOPRESSOR) 50 MG tablet, Take 1 tablet by mouth daily. Multiple Vitamin (MULTIVITAMIN) tablet, Take 1 tablet by mouth daily. citalopram (CELEXA) 20 MG tablet, Take 1 tablet by mouth daily. [DISCONTINUED] ibuprofen (ADVIL;MOTRIN) 400 MG tablet, TAKE 1 TABLET BY MOUTH EVERY 6 HOURS AS NEEDED FOR PAIN. Blood Pressure KIT, Check blood pressure daily.     Current Medications:  Current Facility-Administered Medications: aspirin EC tablet 81 mg, 81 mg, Oral, Daily  butalbital-acetaminophen-caffeine (FIORICET, ESGIC) per tablet 1 tablet, 1 tablet, Oral, Q4H PRN  citalopram (CELEXA) tablet 20 mg, 20 mg, Oral, Daily  [Held by provider] metoprolol tartrate (LOPRESSOR) tablet 50 mg, 50 mg, Oral, Daily  [Held by provider] lisinopril-hydroCHLOROthiazide (PRINZIDE;ZESTORETIC) 20-12.5 MG per tablet 1 tablet, 1 tablet, Oral, Daily  therapeutic multivitamin-minerals 1 tablet, 1 tablet, Oral, Daily  sodium chloride flush 0.9 % injection 10 mL, 10 mL, Intravenous, 2 times per day  sodium chloride flush 0.9 % injection 10 mL, 10 mL, Intravenous, PRN  polyethylene glycol (GLYCOLAX) packet 17 g, 17 g, Oral, Daily PRN  promethazine (PHENERGAN) tablet 12.5 mg, 12.5 mg, Oral, Q6H PRN **OR** ondansetron (ZOFRAN) injection 4 mg, 4 mg, Intravenous, Q6H PRN  clopidogrel (PLAVIX) tablet 75 mg, 75 mg, Oral, Daily  0.9 % sodium chloride infusion, , Intravenous, Continuous  atorvastatin (LIPITOR) tablet 80 mg, 80 mg, Oral, Nightly  folic acid (FOLVITE) tablet 1 mg, 1 mg, Oral, Daily  glucose (GLUTOSE) 40 % oral gel 15 g, 15 g, Oral, PRN  dextrose 50 % IV solution, 12.5 g, Intravenous, PRN  glucagon (rDNA) injection 1 mg, 1 mg, Intramuscular, PRN  dextrose 5 % solution, 100 mL/hr, Intravenous, PRN  potassium chloride (KLOR-CON M) extended release tablet 40 mEq, 40 mEq, Oral, PRN **OR** potassium bicarb-citric acid (EFFER-K) effervescent tablet 40 mEq, 40 mEq, Oral, PRN **OR** potassium chloride 10 mEq/100 mL IVPB (Peripheral Line), 10 mEq, Intravenous, PRN  insulin lispro (HUMALOG) injection vial 0-9 Units, 0-9 Units, Subcutaneous, Nightly  insulin lispro (HUMALOG) injection vial 0-18 Units, 0-18 Units, Subcutaneous, TID WC  heparin 25,000 units in dextrose 5% 250 mL infusion, 12 Units/kg/hr, Intravenous, Continuous    REVIEW OF SYSTEMS     CONSTITUTIONAL: negative for fatigue and malaise   EYES: negative for double vision and photophobia    HEENT: negative for tinnitus and sore throat   RESPIRATORY: negative for cough, shortness of breath   CARDIOVASCULAR: negative for chest pain, palpitations, or syncope   GASTROINTESTINAL: negative for abdominal pain, nausea, vomiting, diarrhea, or constipation    GENITOURINARY: negative for incontinence or retention    MUSCULOSKELETAL: negative for neck or back pain, negative for extremity pain   NEUROLOGICAL: Right hand weakness and numbness/tingling.   Negative for seizures, headaches, confusion, aphasia, dysarthria   PSYCHIATRIC: negative for agitation, no drift; leg holds 30 degree position for full 5 seconds  6b. Motor right le - no drift; leg holds 30 degree position for full 5 seconds  7. Limb Ataxia:  0 - absent  8. Sensory:  1 - mild to moderate sensory loss; patient feels pinprick is less sharp or is dull on the affected side; there is a loss of superficial pain with pinprick but patient is aware of being touched   9. Best Language:  0 - no aphasia, normal  10. Dysarthria:  0 - normal  11. Extinction and Inattention:  0 - no abnormality   TOTAL: 1    LABS AND IMAGING:     RECENT LABS:  CBC with Differential:    Lab Results   Component Value Date    WBC 8.2 2020    RBC 4.10 2020    RBC 3.30 2012    HGB 12.9 2020    HCT 39.4 2020     2020     2012    MCV 96.1 2020    MCH 31.5 2020    MCHC 32.7 2020    RDW 12.1 2020    LYMPHOPCT 25 2020    MONOPCT 6 2020    BASOPCT 0 2020    MONOSABS 0.54 2020    LYMPHSABS 2.16 2020    EOSABS 0.18 2020    BASOSABS 0.03 2020    DIFFTYPE NOT REPORTED 2020     BMP:    Lab Results   Component Value Date     2020    K 3.7 2020     2020    CO2 19 2020    BUN 11 2020    LABALBU 4.5 2014    LABALBU 4.1 2011    CREATININE 0.46 2020    CALCIUM 8.3 2020    GFRAA >60 2020    LABGLOM >60 2020    GLUCOSE 165 2020    GLUCOSE 103 2012       RADIOLOGY:   Ct Head Wo Contrast  Result Date: 2020  Age-indeterminate right posterior MCA infarction. No acute intracranial hemorrhage. Critical results were called by Dr. Mario Zaman MD to [facility] on 2020 at 20:33. Xr Chest Portable  Result Date: 2020  No acute cardiopulmonary abnormality. Cta Head Neck W Contrast  Result Date: 2020  1.  Mid left common carotid artery lateral wall irregular atherosclerotic plaque versus clot which extends centrally within the lumen causing approximately 60% arterial stenosis over a segment measuring 1.5 cm. 2. Otherwise, unremarkable CT angiogram of the head and neck. 3. Redemonstration of right parietal temporal hypoattenuation and volume loss consistent with subacute to remote infarction. RECOMMENDATIONS: Recommend MRI brain with diffusion-weighted imaging for further evaluation of acute ischemia. Mri Brain Without Contrast  Result Date: 9/17/2020  Multiple small acute infarcts within the left cerebral hemisphere. The findings were sent to the Radiology Results [address] at 3:33 pm on 9/17/2020to be communicated to a licensed caregiver. Labs and Images reviewed with:    [x] Jesse Pena MD    [] Love Ro MD  [] Fausto Mims MD  --[] there are no new interval images to review. ASSESSMENT AND PLAN:       The patient is 59 yo female with a history of HTN, HLD, breast CA and previous CVA (around 3 years ago) who presented with complaints of right hand paresthesias and ataxia. Found to have left common carotid plaque vs thrombus with ~60% stenosis. Loaded with Plavix, continued on Aspirin and started on low dose Heparin infusion.      NEUROLOGIC:  - Multiple scattered small acute left hemispheric infarcts  - Left common carotid plaque vs thrombus with ~60% stenosis  - Etiology appears cardioembolic   - MRI Brain showed multiple small acute infarcts within the left cerebral hemisphere, consistent with watershed infarct  - Continue low dose heparin infusion  - Continue Aspirin 81mg QD, Plavix 75mg QD and high dose Lipitor 80mg QHS  - Neuro Endovascular following; plan for repeat CTA at 72h (9/18 ~1900)  - Goal -200  - Consider hypercoag panel pending results of Loop interrogation   - Neuro checks per protocol    CARDIOVASCULAR:  - Goal -200  - Hold home antihypertensives for now  - Troponin 6, EKG NSR  - Echo EF 17%, grade I diastolic dysfunction  - Per recent Cardiology note, previous EDGAR negative for PFO  - Loop recorder in place, Interrogate device  - Lipid panel; LDL 76, Cholesterol 162  - Lipitor 80mg QHS  - Continue telemetry    PULMONARY:  - Maintaining O2 sats on room air  - Baseline CXR with no acute cardiopulmonary abnormality    RENAL/FLUID/ELECTROLYTE:  - Normal renal functioning  - BUN 9/ Creatinine 0.43  - Adequate urine output per nursing  - Continue [email or web address] to help keep SBP within goal  - Hyponatremia, sodium 130; monitor Q6h  - Replace electrolytes PRN  - Daily BMP    GI/NUTRITION:  NUTRITION:  DIET CARB CONTROL;   - Tolerating diet  - Bowel regimen: Start Senokot-S daily, Milk of Mag PRN  - GI prophylaxis: Not indicated    ID:  - Afebrile, Tmax 36.8  - No leukocytosis, WBC 8.3  - COVID-19 negative  - Continue to monitor for fevers  - Daily CBC    HEME:   - H&H 13.1/40.1  - Platelets 452  - Daily CBC    ENDOCRINE:  - Continue to monitor blood glucose, goal <180  - History of Type 2 diabetes  - Hemoglobin A1C 12.2  - Hyperglycemia improved  - Continue High dose insulin sliding scale  - On Tresiba 40u QD and Humalog 9u TID  - Last blood glucose 156, consider starting Lantus based on glucose trend    OTHER:  - PT/OT/ST    PROPHYLAXIS:  Stress ulcer: N/A    DVT PROPHYLAXIS:  - SCD sleeves - Thigh High   - On Heparin infusion    DISPOSITION: Admit to Neuro ICU for close neurological monitoring.         Candida Martel, APRN - [id number] [facility]  Neuro Critical Care Service   Pager [phone]  9/18/2020     7:57 AM

## 2020-09-19 LAB
ABSOLUTE EOS #: 0.07 K/UL (ref 0–0.44)
ABSOLUTE IMMATURE GRANULOCYTE: 0.04 K/UL (ref 0–0.3)
ABSOLUTE LYMPH #: 0.89 K/UL (ref 1.1–3.7)
ABSOLUTE MONO #: 0.56 K/UL (ref 0.1–1.2)
ANION GAP SERPL CALCULATED.3IONS-SCNC: 13 MMOL/L (ref 9–17)
BASOPHILS # BLD: 0 % (ref 0–2)
BASOPHILS ABSOLUTE: 0.03 K/UL (ref 0–0.2)
BUN BLDV-MCNC: 10 MG/DL (ref 8–23)
BUN/CREAT BLD: ABNORMAL (ref 9–20)
CALCIUM SERPL-MCNC: 8.4 MG/DL (ref 8.6–10.4)
CHLORIDE BLD-SCNC: 102 MMOL/L (ref 98–107)
CO2: 20 MMOL/L (ref 20–31)
CREAT SERPL-MCNC: 0.37 MG/DL (ref 0.5–0.9)
DIFFERENTIAL TYPE: ABNORMAL
EOSINOPHILS RELATIVE PERCENT: 1 % (ref 1–4)
GFR AFRICAN AMERICAN: >60 ML/MIN
GFR NON-AFRICAN AMERICAN: >60 ML/MIN
GFR SERPL CREATININE-BSD FRML MDRD: ABNORMAL ML/MIN/{1.73_M2}
GFR SERPL CREATININE-BSD FRML MDRD: ABNORMAL ML/MIN/{1.73_M2}
GLUCOSE BLD-MCNC: 125 MG/DL (ref 70–99)
GLUCOSE BLD-MCNC: 191 MG/DL (ref 65–105)
GLUCOSE BLD-MCNC: 251 MG/DL (ref 65–105)
GLUCOSE BLD-MCNC: 78 MG/DL (ref 65–105)
HCT VFR BLD CALC: 40.3 % (ref 36.3–47.1)
HEMOGLOBIN: 13.6 G/DL (ref 11.9–15.1)
IMMATURE GRANULOCYTES: 1 %
LYMPHOCYTES # BLD: 12 % (ref 24–43)
MCH RBC QN AUTO: 32.2 PG (ref 25.2–33.5)
MCHC RBC AUTO-ENTMCNC: 33.7 G/DL (ref 28.4–34.8)
MCV RBC AUTO: 95.5 FL (ref 82.6–102.9)
MONOCYTES # BLD: 8 % (ref 3–12)
NRBC AUTOMATED: 0 PER 100 WBC
PARTIAL THROMBOPLASTIN TIME: 23.2 SEC (ref 20.5–30.5)
PARTIAL THROMBOPLASTIN TIME: 38.5 SEC (ref 20.5–30.5)
PARTIAL THROMBOPLASTIN TIME: 42.9 SEC (ref 20.5–30.5)
PARTIAL THROMBOPLASTIN TIME: 54.8 SEC (ref 20.5–30.5)
PDW BLD-RTO: 12 % (ref 11.8–14.4)
PLATELET # BLD: 274 K/UL (ref 138–453)
PLATELET ESTIMATE: ABNORMAL
PMV BLD AUTO: 10.1 FL (ref 8.1–13.5)
POTASSIUM SERPL-SCNC: 3.9 MMOL/L (ref 3.7–5.3)
RBC # BLD: 4.22 M/UL (ref 3.95–5.11)
RBC # BLD: ABNORMAL 10*6/UL
SEG NEUTROPHILS: 78 % (ref 36–65)
SEGMENTED NEUTROPHILS ABSOLUTE COUNT: 5.8 K/UL (ref 1.5–8.1)
SODIUM BLD-SCNC: 135 MMOL/L (ref 135–144)
SODIUM BLD-SCNC: 136 MMOL/L (ref 135–144)
WBC # BLD: 7.4 K/UL (ref 3.5–11.3)
WBC # BLD: ABNORMAL 10*3/UL

## 2020-09-19 PROCEDURE — 82947 ASSAY GLUCOSE BLOOD QUANT: CPT

## 2020-09-19 PROCEDURE — 6370000000 HC RX 637 (ALT 250 FOR IP): Performed by: NURSE PRACTITIONER

## 2020-09-19 PROCEDURE — 6360000002 HC RX W HCPCS: Performed by: NURSE PRACTITIONER

## 2020-09-19 PROCEDURE — 99233 SBSQ HOSP IP/OBS HIGH 50: CPT | Performed by: PSYCHIATRY & NEUROLOGY

## 2020-09-19 PROCEDURE — 36415 COLL VENOUS BLD VENIPUNCTURE: CPT

## 2020-09-19 PROCEDURE — 85730 THROMBOPLASTIN TIME PARTIAL: CPT

## 2020-09-19 PROCEDURE — 97116 GAIT TRAINING THERAPY: CPT

## 2020-09-19 PROCEDURE — 2580000003 HC RX 258: Performed by: STUDENT IN AN ORGANIZED HEALTH CARE EDUCATION/TRAINING PROGRAM

## 2020-09-19 PROCEDURE — 6360000002 HC RX W HCPCS: Performed by: STUDENT IN AN ORGANIZED HEALTH CARE EDUCATION/TRAINING PROGRAM

## 2020-09-19 PROCEDURE — 2000000003 HC NEURO ICU R&B

## 2020-09-19 PROCEDURE — 97530 THERAPEUTIC ACTIVITIES: CPT

## 2020-09-19 PROCEDURE — 85025 COMPLETE CBC W/AUTO DIFF WBC: CPT

## 2020-09-19 PROCEDURE — 6370000000 HC RX 637 (ALT 250 FOR IP): Performed by: STUDENT IN AN ORGANIZED HEALTH CARE EDUCATION/TRAINING PROGRAM

## 2020-09-19 PROCEDURE — 80048 BASIC METABOLIC PNL TOTAL CA: CPT

## 2020-09-19 PROCEDURE — 84295 ASSAY OF SERUM SODIUM: CPT

## 2020-09-19 RX ADMIN — CITALOPRAM HYDROBROMIDE 20 MG: 20 TABLET ORAL at 08:38

## 2020-09-19 RX ADMIN — ONDANSETRON 4 MG: 2 INJECTION INTRAMUSCULAR; INTRAVENOUS at 00:06

## 2020-09-19 RX ADMIN — HEPARIN SODIUM AND DEXTROSE 20 UNITS/KG/HR: 10000; 5 INJECTION INTRAVENOUS at 03:30

## 2020-09-19 RX ADMIN — INSULIN LISPRO 5 UNITS: 100 INJECTION, SOLUTION INTRAVENOUS; SUBCUTANEOUS at 20:57

## 2020-09-19 RX ADMIN — Medication 81 MG: at 08:38

## 2020-09-19 RX ADMIN — FOLIC ACID 1 MG: 1 TABLET ORAL at 08:39

## 2020-09-19 RX ADMIN — INSULIN LISPRO 9 UNITS: 100 INJECTION, SOLUTION INTRAVENOUS; SUBCUTANEOUS at 13:45

## 2020-09-19 RX ADMIN — CLOPIDOGREL 75 MG: 75 TABLET, FILM COATED ORAL at 08:38

## 2020-09-19 RX ADMIN — ATORVASTATIN CALCIUM 80 MG: 80 TABLET, FILM COATED ORAL at 21:01

## 2020-09-19 RX ADMIN — SODIUM CHLORIDE, PRESERVATIVE FREE 10 ML: 5 INJECTION INTRAVENOUS at 20:57

## 2020-09-19 ASSESSMENT — PAIN SCALES - GENERAL
PAINLEVEL_OUTOF10: 0

## 2020-09-19 NOTE — PROGRESS NOTES
Neuro Critical Care  Daily Progress Note    Patient Name: Jonny Martines  Patient : 1955  Room/Bed: 9689/6150-84  Code Status: FULL  Allergies: Allergies   Allergen Reactions    Compazine [Prochlorperazine Maleate]     Prochlorperazine Edisylate     Reglan [Metoclopramide]     Vicodin [Hydrocodone-Acetaminophen]        CHIEF COMPLAINT     Right hand paresthesias    HPI    History Obtained From: Patient, EMR    The patient is a 60 y.o. female with a history of HTN, HLD, breast CA and previous CVA (around 3 years ago) who presented with complaints of right hand paresthesias and ataxia. LKW around 1900. Per records, patient's  noticed patient was not acting like herself and had right sided weakness. Patient reports she was gardening and noticed her right hand wasn't working and felt like it had \"electricity\" running through it. She states she went inside and asked her daughter to call 911 as she had similar symptoms during her last CVA, but on her left side. On arrival to the ED, NIH 1 for sensory. CT Head with no acute abnormality. TPA not administered due to low NIH and no disabling symptoms. CTA Head/Neck showed left common carotid artery lateral wall irregularity, plaque vs clot, causing ~60@ stenosis. Patient was loaded with 300mg Plavix, continued on Aspirin and started on low dose Heparin infusion. She was initially admitted to [facility] under the General Neurology team.  Neuro Critical Care was asked to take over and transfer patient to Neuro ICU for close monitoring as she is on dual antiplatelet therapy and heparin infusion. Of note, patient reports a history of a previous CVA about 3 years ago. She reports her left arm was weak at that time, but has fully recovered. She has a Loop recorder. :  MRI Brain yesterday showed scattered infarcts within the left cerebral hemisphere consistent with watershed infarct. No acute events overnight.   Remains on heparin QUIT LAST FRI    Drug use: No    Sexual activity: Yes     Partners: Male   Lifestyle    Physical activity     Days per week: Not on file     Minutes per session: Not on file    Stress: Not on file   Relationships    Social connections     Talks on phone: Not on file     Gets together: Not on file     Attends Anabaptist service: Not on file     Active member of club or organization: Not on file     Attends meetings of clubs or organizations: Not on file     Relationship status: Not on file    Intimate partner violence     Fear of current or ex partner: Not on file     Emotionally abused: Not on file     Physically abused: Not on file     Forced sexual activity: Not on file   Other Topics Concern    Not on file   Social History Narrative    ** Merged History Encounter **            Family History:       Problem Relation Age of Onset    Coronary Art Dis Mother     Heart Disease Mother     Coronary Art Dis Father     Diabetes Father     High Blood Pressure Father     Coronary Art Dis Sister        Allergies:    Compazine [prochlorperazine maleate]; Prochlorperazine edisylate; Reglan [metoclopramide]; and Vicodin [hydrocodone-acetaminophen]    Medications Prior to Admission:    Medications Prior to Admission: lisinopril-hydrochlorothiazide (PRINZIDE;ZESTORETIC) 20-12.5 MG per tablet, Take 1 tablet by mouth daily  aspirin 81 MG tablet, Take 81 mg by mouth daily  butalbital-APAP-caffeine (FIORICET) -40 MG CAPS per capsule, Take 1 capsule by mouth every 4 hours as needed for Headaches  metoprolol (LOPRESSOR) 50 MG tablet, Take 1 tablet by mouth daily. Multiple Vitamin (MULTIVITAMIN) tablet, Take 1 tablet by mouth daily. citalopram (CELEXA) 20 MG tablet, Take 1 tablet by mouth daily. [DISCONTINUED] ibuprofen (ADVIL;MOTRIN) 400 MG tablet, TAKE 1 TABLET BY MOUTH EVERY 6 HOURS AS NEEDED FOR PAIN. Blood Pressure KIT, Check blood pressure daily.     Current Medications:  Current Facility-Administered Medications: aspirin EC tablet 81 mg, 81 mg, Oral, Daily  butalbital-acetaminophen-caffeine (FIORICET, ESGIC) per tablet 1 tablet, 1 tablet, Oral, Q4H PRN  citalopram (CELEXA) tablet 20 mg, 20 mg, Oral, Daily  [Held by provider] metoprolol tartrate (LOPRESSOR) tablet 50 mg, 50 mg, Oral, Daily  [Held by provider] lisinopril-hydroCHLOROthiazide (PRINZIDE;ZESTORETIC) 20-12.5 MG per tablet 1 tablet, 1 tablet, Oral, Daily  therapeutic multivitamin-minerals 1 tablet, 1 tablet, Oral, Daily  sodium chloride flush 0.9 % injection 10 mL, 10 mL, Intravenous, 2 times per day  sodium chloride flush 0.9 % injection 10 mL, 10 mL, Intravenous, PRN  polyethylene glycol (GLYCOLAX) packet 17 g, 17 g, Oral, Daily PRN  promethazine (PHENERGAN) tablet 12.5 mg, 12.5 mg, Oral, Q6H PRN **OR** ondansetron (ZOFRAN) injection 4 mg, 4 mg, Intravenous, Q6H PRN  clopidogrel (PLAVIX) tablet 75 mg, 75 mg, Oral, Daily  0.9 % sodium chloride infusion, , Intravenous, Continuous  atorvastatin (LIPITOR) tablet 80 mg, 80 mg, Oral, Nightly  folic acid (FOLVITE) tablet 1 mg, 1 mg, Oral, Daily  glucose (GLUTOSE) 40 % oral gel 15 g, 15 g, Oral, PRN  dextrose 50 % IV solution, 12.5 g, Intravenous, PRN  glucagon (rDNA) injection 1 mg, 1 mg, Intramuscular, PRN  dextrose 5 % solution, 100 mL/hr, Intravenous, PRN  potassium chloride (KLOR-CON M) extended release tablet 40 mEq, 40 mEq, Oral, PRN **OR** potassium bicarb-citric acid (EFFER-K) effervescent tablet 40 mEq, 40 mEq, Oral, PRN **OR** potassium chloride 10 mEq/100 mL IVPB (Peripheral Line), 10 mEq, Intravenous, PRN  insulin lispro (HUMALOG) injection vial 0-9 Units, 0-9 Units, Subcutaneous, Nightly  insulin lispro (HUMALOG) injection vial 0-18 Units, 0-18 Units, Subcutaneous, TID WC  heparin 25,000 units in dextrose 5% 250 mL infusion, 12 Units/kg/hr, Intravenous, Continuous    REVIEW OF SYSTEMS     CONSTITUTIONAL: negative for fatigue and malaise   EYES: negative for double vision and photophobia HEENT: negative for tinnitus and sore throat   RESPIRATORY: negative for cough, shortness of breath   CARDIOVASCULAR: negative for chest pain, palpitations, or syncope   GASTROINTESTINAL: negative for abdominal pain, nausea, vomiting, diarrhea, or constipation    GENITOURINARY: negative for incontinence or retention    MUSCULOSKELETAL: negative for neck or back pain, negative for extremity pain   NEUROLOGICAL: Right hand weakness and numbness/tingling. Negative for seizures, headaches, confusion, aphasia, dysarthria   PSYCHIATRIC: negative for agitation, hallucination, SI/HI   SKIN Negative for spontaneous contusions, rashes, or lesions      PHYSICAL EXAM:     BP (!) 121/92   Pulse 92   Temp 98.3 °F (36.8 °C)   Resp 16   Ht 4' 11\" (1.499 m)   Wt 115 lb 11.9 oz (52.5 kg)   SpO2 95%   BMI 23.38 kg/m²     PHYSICAL EXAM:  CONSTITUTIONAL:  Well developed, well nourished. Alert and oriented x 3, in no acute distress. GCS 15. Nontoxic. No dysarthria. No aphasia. HEAD:  normocephalic, atraumatic    EYES:  PERRL, EOMI.   ENT:  moist mucous membranes   LUNGS:  Equal air entry bilaterally, clear   CARDIOVASCULAR:  normal s1 / s2, RRR   ABDOMEN:  Soft, no rigidity   NECK supple, symmetric   EXTREMITIES Normal ROM with no deformities   NEUROLOGIC:  Mental Status:  A & O x3, Awake             Cranial Nerves:    cranial nerves II-XII are grossly intact    Motor Exam:    Drift:  absent  Tone:  normal    Motor exam is 5 out of 5 all extremities with the exception of right upper extremity 4/5 strength without drift, moderate hand grasp.     Sensory:    Touch:    Right Upper Extremity:  abnormal - numbness/tingling to right hand  Left Upper Extremity:  normal  Right Lower Extremity:  normal  Left Lower Extremity:  normal    Coordination/Dysmetria:  Heel to Shin:  Right:  normal  Left:  normal  Finger to Nose:   Right:  normal  Left:  normal      SKIN No obvious ecchymosis, rashes, or lesions    NIH Stroke Scale Total (if not done complete detailed one below):    1a.  Level of consciousness:  0 - alert; keenly responsive  1b. Level of consciousness questions:  0 - answers both questions correctly  1c. Level of consciousness questions:  0 - performs both tasks correctly  2. Best Gaze:  0 - normal  3. Visual:  0 - no visual loss  4. Facial Palsy:  0 - normal symmetric movement  5a. Motor left arm:  0 - no drift, limb holds 90 (or 45) degrees for full 10 seconds  5b. Motor right arm:  0 - no drift, limb holds 90 (or 45) degrees for full 10 seconds  6a. Motor left le - no drift; leg holds 30 degree position for full 5 seconds  6b. Motor right le - no drift; leg holds 30 degree position for full 5 seconds  7. Limb Ataxia:  0 - absent  8. Sensory:  1 - mild to moderate sensory loss; patient feels pinprick is less sharp or is dull on the affected side; there is a loss of superficial pain with pinprick but patient is aware of being touched   9. Best Language:  0 - no aphasia, normal  10. Dysarthria:  0 - normal  11.   Extinction and Inattention:  0 - no abnormality   TOTAL: 1    LABS AND IMAGING:     RECENT LABS:  CBC with Differential:    Lab Results   Component Value Date    WBC 7.4 2020    RBC 4.22 2020    RBC 3.30 2012    HGB 13.6 2020    HCT 40.3 2020     2020     2012    MCV 95.5 2020    MCH 32.2 2020    MCHC 33.7 2020    RDW 12.0 2020    LYMPHOPCT 12 2020    MONOPCT 8 2020    BASOPCT 0 2020    MONOSABS 0.56 2020    LYMPHSABS 0.89 2020    EOSABS 0.07 2020    BASOSABS 0.03 2020    DIFFTYPE NOT REPORTED 2020     BMP:    Lab Results   Component Value Date     2020    K 3.9 2020     2020    CO2 20 2020    BUN 10 2020    LABALBU 4.5 2014    LABALBU 4.1 2011    CREATININE 0.37 2020    CALCIUM 8.4 2020    GFRAA >60 09/19/2020    LABGLOM >60 09/19/2020    GLUCOSE 125 09/19/2020    GLUCOSE 103 05/08/2012       RADIOLOGY:   Ct Head Wo Contrast  Result Date: 9/16/2020  Age-indeterminate right posterior MCA infarction. No acute intracranial hemorrhage. Critical results were called by Dr. Bradley Mccann MD to [facility] on 9/16/2020 at 20:33. Xr Chest Portable  Result Date: 9/16/2020  No acute cardiopulmonary abnormality. Cta Head Neck W Contrast  Result Date: 9/16/2020  1. Mid left common carotid artery lateral wall irregular atherosclerotic plaque versus clot which extends centrally within the lumen causing approximately 60% arterial stenosis over a segment measuring 1.5 cm. 2. Otherwise, unremarkable CT angiogram of the head and neck. 3. Redemonstration of right parietal temporal hypoattenuation and volume loss consistent with subacute to remote infarction. RECOMMENDATIONS: Recommend MRI brain with diffusion-weighted imaging for further evaluation of acute ischemia. Mri Brain Without Contrast  Result Date: 9/17/2020  Multiple small acute infarcts within the left cerebral hemisphere. The findings were sent to the Radiology Results [address] at 3:33 pm on 9/17/2020to be communicated to a licensed caregiver. Labs and Images reviewed with:    [x] Jesse Valera MD    [] Tami Amanda MD  [] Mildred Muller MD  --[] there are no new interval images to review. ASSESSMENT AND PLAN:       The patient is 59 yo female with a history of HTN, HLD, breast CA and previous CVA (around 3 years ago) who presented with complaints of right hand paresthesias and ataxia. Found to have left common carotid plaque vs thrombus with ~60% stenosis. Loaded with Plavix, continued on Aspirin and started on low dose Heparin infusion.      NEUROLOGIC:  - Multiple scattered small acute left hemispheric infarcts  - Left common carotid plaque vs thrombus with ~60% stenosis  - Etiology appears cardioembolic   - MRI Brain showed multiple small acute infarcts within the left cerebral hemisphere, consistent with watershed infarct    - Continue low dose heparin infusion  - Continue Aspirin 81mg QD, Plavix 75mg QD and high dose Lipitor 80mg QHS  - Neuro Endovascular following; CTA pending  - Goal -200  - Consider hypercoag panel pending results of Loop interrogation   - Neuro checks per protocol    CARDIOVASCULAR:  - Goal -200  - Hold home antihypertensives for now  - Troponin 6, EKG NSR  - Echo EF 91%, grade I diastolic dysfunction  - Per recent Cardiology note, previous EDGAR negative for PFO     - Loop recorder in place, Interrogate device today 9/19 (follow up)   - Lipid panel; LDL 76, Cholesterol 162  - Lipitor 80mg QHS  - Continue telemetry    PULMONARY:  - Maintaining O2 sats on room air  - Baseline CXR with no acute cardiopulmonary abnormality    RENAL/FLUID/ELECTROLYTE:  - Normal renal functioning  - BUN 9/ Creatinine 0.43  - Adequate urine output per nursing  - Continue [email or web address] to help keep SBP within goal    Hyponatremia, sodium 136(resolved) ; monitor Q6h discontinued   - Replace electrolytes PRN  - Daily BMP    GI/NUTRITION:  NUTRITION:  DIET CARB CONTROL;   - Tolerating diet  - Bowel regimen: Start Senokot-S daily, Milk of Mag PRN  - GI prophylaxis: Not indicated    ID:  - Afebrile, Tmax 36.8  - No leukocytosis, WBC 8.3  - COVID-19 negative  - Continue to monitor for fevers  - Daily CBC    HEME:   - H&H stable  - Platelets 298  - Daily CBC    ENDOCRINE:  - Continue to monitor blood glucose, goal <180  - History of Type 2 diabetes  - Hemoglobin A1C 12.2    - Hyperglycemia improved  - Continue High dose insulin sliding scale  - On Tresiba 40u QD and Humalog 9u TID  - Last blood glucose 156, consider starting Lantus based on glucose trend    OTHER:  - PT/OT/ST    PROPHYLAXIS:  Stress ulcer: N/A    DVT PROPHYLAXIS:  - SCD sleeves - Thigh High   - On Heparin infusion    DISPOSITION: ok for step down tomorrow  - working with  for home discharge with homecare, PT amb >200 ft        Lawrence Marie MD  Neuro Critical Care Service   Pager [phone]  9/19/2020     8:08 AM

## 2020-09-19 NOTE — PROGRESS NOTES
ENDOVASCULAR NEUROSURGERY PROGRESS NOTE  9/19/2020 10:24 AM  Subjective:   Admit Date: 9/16/2020  PCP: Princeton Mcburney, APRN - CNP    No acute events overnight. Neuro exam stable. Objective:   Vitals: BP (!) 121/92   Pulse 92   Temp 98.3 °F (36.8 °C)   Resp 16   Ht 4' 11\" (1.499 m)   Wt 115 lb 11.9 oz (52.5 kg)   SpO2 95%   BMI 23.38 kg/m²   General appearance: NAD. HEENT: Atraumatic. Neck: Neck is supple. Lungs: No respiratory distress noted. Heart: normal sinus rhythm on tele. .   Abdomen: Soft nontender. Extremities: No lower limb edema noted. Neurologic:awake, following commands, no dysarthria  CN: Has intact extraocular muscles movements, no facial droop noted, no decreased sensation on R face and arm today  MOTOR: Has good strength in both upper and lower extremities, moving both upper and lower extremities against gravity with no drift. SENSORY: Normal sensation in both upper and lower extremities.     Medications and labs:   Scheduled Meds:   aspirin  81 mg Oral Daily    citalopram  20 mg Oral Daily    [Held by provider] metoprolol tartrate  50 mg Oral Daily    [Held by provider] lisinopril-hydroCHLOROthiazide  1 tablet Oral Daily    therapeutic multivitamin-minerals  1 tablet Oral Daily    sodium chloride flush  10 mL Intravenous 2 times per day    clopidogrel  75 mg Oral Daily    atorvastatin  80 mg Oral Nightly    folic acid  1 mg Oral Daily    insulin lispro  0-9 Units Subcutaneous Nightly    insulin lispro  0-18 Units Subcutaneous TID WC     Continuous Infusions:   sodium chloride 100 mL/hr at 09/17/20 0200    dextrose      heparin (porcine) 20 Units/kg/hr (09/19/20 0330)     CBC:   Recent Labs     09/17/20 0719 09/18/20 0928 09/19/20  0222   WBC 8.2 8.3 7.4   HGB 12.9 13.1 13.6    267 274     BMP:    Recent Labs     09/17/20  0719 09/18/20  0928 09/18/20  1859 09/19/20  0222 09/19/20  0459    130* 132* 135 136   K 3.7 3.7  --  3.9  --    * 100  -- with aspirin Plavix. 5. Statin therapy. 6. IV hydration. 7. Transthoracic echo.       Joana Kelly MD  Stroke, [facility]  [address]  Electronically signed 9/19/2020 at 10:24 AM

## 2020-09-19 NOTE — PROGRESS NOTES
Physical Therapy  Facility/Department: [address]  Daily Treatment Note  NAME: Maryjane Song  : 1955  MRN: [id number]    Date of Service: 2020    Discharge Recommendations:  Patient would benefit from continued therapy after discharge    Assessment   Body structures, Functions, Activity limitations: Decreased functional mobility ; Decreased strength;Decreased endurance;Decreased balance  Assessment: Pt amb 220ft w CGA and no AD, performed 5steps CGA no LOB throughout. . Pt limited by fatigue. Pt would benefit from continued PT to address strength and endurance deficits. Prognosis: Good  PT Education: Goals;Plan of Care;Transfer Training;General Safety;Precautions;Gait Training;Functional Mobility Training  REQUIRES PT FOLLOW UP: Yes  Activity Tolerance  Activity Tolerance: Patient Tolerated treatment well;Patient limited by fatigue;Patient limited by endurance     Patient Diagnosis(es): The encounter diagnosis was Cerebrovascular accident (CVA), unspecified mechanism ([facility] [address] has a past medical history of Breast cancer ([facility] [address] Depression, HTN (hypertension), Hyperlipidemia, Ovarian cyst, bilateral, Pancreatitis, and Renal stone. has a past surgical history that includes Inguinal hernia repair; Appendectomy; lymphadenectomy (Bilateral); Tunneled venous port placement; Breast lumpectomy; and Breast surgery. Restrictions  Restrictions/Precautions  Restrictions/Precautions: General Precautions, Up as Tolerated, Fall Risk  Required Braces or Orthoses?: No  Position Activity Restriction  Other position/activity restrictions: Amb Pt 200ft  Subjective   General  Chart Reviewed: Yes  Response To Previous Treatment: Patient with no complaints from previous session. Family / Caregiver Present: No  Subjective  Subjective: RN and pt in agreement to PT. Pt supine in bed upon arrival. Pt pleasant and cooperative throughout.   Pain Screening  Patient Currently in Pain: Denies  Vital Signs  Patient Currently in Pain: Denies       Orientation  Orientation  Overall Orientation Status: Within Functional Limits  Cognition      Objective   Bed mobility  Supine to Sit: Independent  Sit to Supine: Independent  Scooting: Independent  Comment: HOB slightly elevated  Transfers  Sit to Stand: Stand by assistance  Stand to sit: Stand by assistance  Ambulation  Ambulation?: Yes  More Ambulation?: No  Ambulation 1  Surface: level tile  Device: No Device  Assistance: Contact guard assistance  Quality of Gait: Slow and cautious,grossly steady  Gait Deviations: Slow Elda;Decreased step length;Decreased step height  Distance: 220 ft  Comments: Pt required increased time to ambulate,  Stairs/Curb  Stairs?: Yes  Stairs  # Steps : 5  Stairs Height: 6\"  Rails: Right ascending  Device: No Device  Assistance: Contact guard assistance  Comment: Step to. descending backward. No LOB noted.      Balance  Posture: Good  Sitting - Static: Good  Sitting - Dynamic: Good  Standing - Static: Fair;+  Standing - Dynamic: Fair;+  Comments: Standing balance assessed w/o AD  Exercises  Comments: Marching x30      Goals  Short term goals  Time Frame for Short term goals: 6 visits  Short term goal 1: Pt to perform bed mobility and functional transfers with independence  Short term goal 2: Pt to ambulate 240 ft SBA and no AD to promote independence  Short term goal 3: Pt to navigate full flight of stairs (~15) w/o HR and SBA to simulate home environment  Short term goal 4: Pt to tolerate 30 min of PT to demo improved endurance/ decreased fatigue  Short term goal 5: Pt to demo good- standing dynamic balance to reduce risk of falls    Plan    Plan  Times per week: 5x/wk  Times per day: Daily  Specific instructions for Next Treatment: If endurance is sufficient to ensure safety, try stairs  Current Treatment Recommendations: Strengthening, Balance Training, Functional Mobility Training, Transfer Training, Endurance Training, Home Exercise Program, Safety Education & Training, Patient/Caregiver Education & Training, Gait Training, Stair training  Safety Devices  Type of devices:  All fall risk precautions in place, Call light within reach, Left in bed, Nurse notified, Gait belt  Restraints  Initially in place: No     Therapy Time   Individual Concurrent Group Co-treatment   Time In 1325         Time Out 1349         Minutes 24         Timed Code Treatment Minutes: 24 Minutes       Isaías June, PTA

## 2020-09-19 NOTE — CARE COORDINATION
Transitional planning. Spoke to pt about therapy recommendations. Pt is acceptable to home care, reviewed home care list. Referral sent to [address].

## 2020-09-19 NOTE — PLAN OF CARE
DATE: 9/19/2020    AWAKE & FOLLOWING COMMANDS:  [] No   [x] Yes    INTUBATED:   [x] No   [] Yes    SEDATION/ANALGESIA: [x] No Sedation or Not Applicable    VASOPRESSORS:  [x] No     CENTRAL LINES:  [x] No      ALBERTO CATHETER: [x] No      FEEDING: Able to take PO?   [x] Carb Control    DVT Prophylaxis:    [x] Anticoagulation - Heparin Drip    Stress Ulcer Prophylaxis:    [x] Not indicated    Blood Glucose:  [x] Blood glucose <180 consistently  [x] Insulin sliding scale      HOB >30 Degrees:  [x] Yes      Secondary Stroke Preventions:  [x] Aspirin 81mg       [x] Clopidogrel 75mg PO D      [x] Atorvastatin 40mg PO HS    Benja Meredith MD [facility]  Adult General Neurology Resident PGY-4  Administrative Chief Resident  9/19/2020 at 11:58 AM

## 2020-09-20 ENCOUNTER — APPOINTMENT (OUTPATIENT)
Dept: CT IMAGING | Age: 65
DRG: 065 | End: 2020-09-20
Payer: COMMERCIAL

## 2020-09-20 LAB
ABSOLUTE EOS #: 0.05 K/UL (ref 0–0.44)
ABSOLUTE IMMATURE GRANULOCYTE: 0.06 K/UL (ref 0–0.3)
ABSOLUTE LYMPH #: 1.21 K/UL (ref 1.1–3.7)
ABSOLUTE MONO #: 0.79 K/UL (ref 0.1–1.2)
ANION GAP SERPL CALCULATED.3IONS-SCNC: 11 MMOL/L (ref 9–17)
BASOPHILS # BLD: 0 % (ref 0–2)
BASOPHILS ABSOLUTE: 0.03 K/UL (ref 0–0.2)
BUN BLDV-MCNC: 9 MG/DL (ref 8–23)
BUN/CREAT BLD: ABNORMAL (ref 9–20)
CALCIUM SERPL-MCNC: 8.4 MG/DL (ref 8.6–10.4)
CHLORIDE BLD-SCNC: 101 MMOL/L (ref 98–107)
CO2: 20 MMOL/L (ref 20–31)
CREAT SERPL-MCNC: 0.57 MG/DL (ref 0.5–0.9)
DIFFERENTIAL TYPE: ABNORMAL
EOSINOPHILS RELATIVE PERCENT: 0 % (ref 1–4)
GFR AFRICAN AMERICAN: >60 ML/MIN
GFR NON-AFRICAN AMERICAN: >60 ML/MIN
GFR SERPL CREATININE-BSD FRML MDRD: ABNORMAL ML/MIN/{1.73_M2}
GFR SERPL CREATININE-BSD FRML MDRD: ABNORMAL ML/MIN/{1.73_M2}
GLUCOSE BLD-MCNC: 156 MG/DL (ref 65–105)
GLUCOSE BLD-MCNC: 198 MG/DL (ref 70–99)
GLUCOSE BLD-MCNC: 199 MG/DL (ref 65–105)
GLUCOSE BLD-MCNC: 200 MG/DL (ref 65–105)
GLUCOSE BLD-MCNC: 214 MG/DL (ref 65–105)
GLUCOSE BLD-MCNC: 237 MG/DL (ref 65–105)
GLUCOSE BLD-MCNC: 271 MG/DL (ref 65–105)
GLUCOSE BLD-MCNC: 271 MG/DL (ref 65–105)
HCT VFR BLD CALC: 40.9 % (ref 36.3–47.1)
HEMOGLOBIN: 13.7 G/DL (ref 11.9–15.1)
IMMATURE GRANULOCYTES: 1 %
LYMPHOCYTES # BLD: 10 % (ref 24–43)
MCH RBC QN AUTO: 31.5 PG (ref 25.2–33.5)
MCHC RBC AUTO-ENTMCNC: 33.5 G/DL (ref 28.4–34.8)
MCV RBC AUTO: 94 FL (ref 82.6–102.9)
MONOCYTES # BLD: 7 % (ref 3–12)
NRBC AUTOMATED: 0 PER 100 WBC
PARTIAL THROMBOPLASTIN TIME: 62.2 SEC (ref 20.5–30.5)
PARTIAL THROMBOPLASTIN TIME: 73.1 SEC (ref 20.5–30.5)
PARTIAL THROMBOPLASTIN TIME: 73.5 SEC (ref 20.5–30.5)
PARTIAL THROMBOPLASTIN TIME: 76.1 SEC (ref 20.5–30.5)
PDW BLD-RTO: 12 % (ref 11.8–14.4)
PLATELET # BLD: 283 K/UL (ref 138–453)
PLATELET ESTIMATE: ABNORMAL
PMV BLD AUTO: 10.2 FL (ref 8.1–13.5)
POTASSIUM SERPL-SCNC: 4.3 MMOL/L (ref 3.7–5.3)
RBC # BLD: 4.35 M/UL (ref 3.95–5.11)
RBC # BLD: ABNORMAL 10*6/UL
SEG NEUTROPHILS: 82 % (ref 36–65)
SEGMENTED NEUTROPHILS ABSOLUTE COUNT: 9.51 K/UL (ref 1.5–8.1)
SERUM OSMOLALITY: 287 MOSM/KG (ref 275–295)
SODIUM BLD-SCNC: 132 MMOL/L (ref 135–144)
WBC # BLD: 11.7 K/UL (ref 3.5–11.3)
WBC # BLD: ABNORMAL 10*3/UL

## 2020-09-20 PROCEDURE — 6370000000 HC RX 637 (ALT 250 FOR IP): Performed by: NURSE PRACTITIONER

## 2020-09-20 PROCEDURE — 83935 ASSAY OF URINE OSMOLALITY: CPT

## 2020-09-20 PROCEDURE — 6370000000 HC RX 637 (ALT 250 FOR IP): Performed by: STUDENT IN AN ORGANIZED HEALTH CARE EDUCATION/TRAINING PROGRAM

## 2020-09-20 PROCEDURE — 81240 F2 GENE: CPT

## 2020-09-20 PROCEDURE — 81291 MTHFR GENE: CPT

## 2020-09-20 PROCEDURE — 70498 CT ANGIOGRAPHY NECK: CPT

## 2020-09-20 PROCEDURE — 99233 SBSQ HOSP IP/OBS HIGH 50: CPT | Performed by: PSYCHIATRY & NEUROLOGY

## 2020-09-20 PROCEDURE — 6360000004 HC RX CONTRAST MEDICATION: Performed by: PSYCHIATRY & NEUROLOGY

## 2020-09-20 PROCEDURE — 84300 ASSAY OF URINE SODIUM: CPT

## 2020-09-20 PROCEDURE — 36415 COLL VENOUS BLD VENIPUNCTURE: CPT

## 2020-09-20 PROCEDURE — 81241 F5 GENE: CPT

## 2020-09-20 PROCEDURE — 2060000000 HC ICU INTERMEDIATE R&B

## 2020-09-20 PROCEDURE — 85025 COMPLETE CBC W/AUTO DIFF WBC: CPT

## 2020-09-20 PROCEDURE — 85730 THROMBOPLASTIN TIME PARTIAL: CPT

## 2020-09-20 PROCEDURE — 80048 BASIC METABOLIC PNL TOTAL CA: CPT

## 2020-09-20 PROCEDURE — 83930 ASSAY OF BLOOD OSMOLALITY: CPT

## 2020-09-20 RX ORDER — INSULIN GLARGINE 100 [IU]/ML
10 INJECTION, SOLUTION SUBCUTANEOUS 2 TIMES DAILY
Status: DISCONTINUED | OUTPATIENT
Start: 2020-09-20 | End: 2020-09-22

## 2020-09-20 RX ADMIN — INSULIN LISPRO 9 UNITS: 100 INJECTION, SOLUTION INTRAVENOUS; SUBCUTANEOUS at 09:00

## 2020-09-20 RX ADMIN — ATORVASTATIN CALCIUM 80 MG: 80 TABLET, FILM COATED ORAL at 21:19

## 2020-09-20 RX ADMIN — INSULIN LISPRO 3 UNITS: 100 INJECTION, SOLUTION INTRAVENOUS; SUBCUTANEOUS at 21:19

## 2020-09-20 RX ADMIN — CLOPIDOGREL 75 MG: 75 TABLET, FILM COATED ORAL at 09:41

## 2020-09-20 RX ADMIN — CITALOPRAM HYDROBROMIDE 20 MG: 20 TABLET ORAL at 09:41

## 2020-09-20 RX ADMIN — MULTIPLE VITAMINS W/ MINERALS TAB 1 TABLET: TAB at 11:00

## 2020-09-20 RX ADMIN — INSULIN GLARGINE 10 UNITS: 100 INJECTION, SOLUTION SUBCUTANEOUS at 14:45

## 2020-09-20 RX ADMIN — Medication 81 MG: at 09:41

## 2020-09-20 RX ADMIN — FOLIC ACID 1 MG: 1 TABLET ORAL at 09:42

## 2020-09-20 RX ADMIN — IOHEXOL 90 ML: 350 INJECTION, SOLUTION INTRAVENOUS at 00:24

## 2020-09-20 ASSESSMENT — PAIN SCALES - GENERAL
PAINLEVEL_OUTOF10: 0

## 2020-09-20 NOTE — PROGRESS NOTES
infusion, PTT therapeutic at 54.6 this morning. Clinical exam remains stable, NIH 1 for right hand sensory changes. 9/19:  Patient continues to improve on neurological exam, states that she is still weak on the right upper extremity, on exam better strength on handgrip, 4 out of 5, no drift on the right arm, able to ambulate is working with physical therapy, ambulated more than 200 feet. Working with  for discharge home with home care. Patient waiting on CTA, remains on low-dose heparin infusion. Remains on aspirin, Plavix. Hyponatremia resolved, discontinued every 6 sodium checks. Patient can be stepdown tomorrow. Loop recorder still needs to be interrogated. 9/20:  Patient exam continues to improve she has better strength on the right upper extremity, states that she is able to grab and hold things as she was not able to do before. No acute events overnight, states that she is progressively getting better, no fevers, denies chest pains or shortness of breath. Patient does have a uptrending white count and elevated glucose likely restart home insulin dose at this time. CTA of the head this morning reports improving left communicating artery stenosis, reduced to 30%, mural thrombus looks reduced. Waiting on endovascular Carlos after seeing this new imaging. Remains on heparin low-dose along with aspirin and Plavix. PTT this morning 62.2. Loop recorder device was interrogated 9/19, no recorded events.   Ordering hypercoagulation studies        Admitted to ICU From: Neuro Stepdown  Reason for ICU Admission: Close monitoring on dual antiplatelet and heparin infusion       PATIENT HISTORY   Past Medical History:        Diagnosis Date    Breast cancer ([facility] [address]     Depression     HTN (hypertension)     Hyperlipidemia     Ovarian cyst, bilateral     Pancreatitis     Renal stone        Past Surgical History:        Procedure Laterality Date    APPENDECTOMY      BREAST LUMPECTOMY bilateral breasts    BREAST SURGERY      INGUINAL HERNIA REPAIR      LYMPHADENECTOMY Bilateral     TUNNELED VENOUS PORT PLACEMENT         Social History:   Social History     Socioeconomic History    Marital status: Single     Spouse name: Not on file    Number of children: Not on file    Years of education: Not on file    Highest education level: Not on file   Occupational History     Employer: NONE   Social Needs    Financial resource strain: Not on file    Food insecurity     Worry: Not on file     Inability: Not on file    Transportation needs     Medical: Not on file     Non-medical: Not on file   Tobacco Use    Smoking status: Never Smoker    Smokeless tobacco: Never Used   Substance and Sexual Activity    Alcohol use: Yes     Comment: rare, USED TO DRINK 2 BOTTLES OF WINE/WK QUIT LAST FRI    Drug use: No    Sexual activity: Yes     Partners: Male   Lifestyle    Physical activity     Days per week: Not on file     Minutes per session: Not on file    Stress: Not on file   Relationships    Social connections     Talks on phone: Not on file     Gets together: Not on file     Attends [employer] service: Not on file     Active member of club or organization: Not on file     Attends meetings of clubs or organizations: Not on file     Relationship status: Not on file    Intimate partner violence     Fear of current or ex partner: Not on file     Emotionally abused: Not on file     Physically abused: Not on file     Forced sexual activity: Not on file   Other Topics Concern    Not on file   Social History Narrative    ** Merged History Encounter **            Family History:       Problem Relation Age of Onset    Coronary Art Dis Mother     Heart Disease Mother     Coronary Art Dis Father     Diabetes Father     High Blood Pressure Father     Coronary Art Dis Sister        Allergies:    Compazine [prochlorperazine maleate];  Prochlorperazine edisylate; Reglan [metoclopramide]; and Vicodin [hydrocodone-acetaminophen]    Medications Prior to Admission:    Medications Prior to Admission: lisinopril-hydrochlorothiazide (PRINZIDE;ZESTORETIC) 20-12.5 MG per tablet, Take 1 tablet by mouth daily  aspirin 81 MG tablet, Take 81 mg by mouth daily  butalbital-APAP-caffeine (FIORICET) -40 MG CAPS per capsule, Take 1 capsule by mouth every 4 hours as needed for Headaches  metoprolol (LOPRESSOR) 50 MG tablet, Take 1 tablet by mouth daily. Multiple Vitamin (MULTIVITAMIN) tablet, Take 1 tablet by mouth daily. citalopram (CELEXA) 20 MG tablet, Take 1 tablet by mouth daily. [DISCONTINUED] ibuprofen (ADVIL;MOTRIN) 400 MG tablet, TAKE 1 TABLET BY MOUTH EVERY 6 HOURS AS NEEDED FOR PAIN. Blood Pressure KIT, Check blood pressure daily.     Current Medications:  Current Facility-Administered Medications: insulin glargine (LANTUS) injection vial 10 Units, 10 Units, Subcutaneous, BID  aspirin EC tablet 81 mg, 81 mg, Oral, Daily  butalbital-acetaminophen-caffeine (FIORICET, ESGIC) per tablet 1 tablet, 1 tablet, Oral, Q4H PRN  citalopram (CELEXA) tablet 20 mg, 20 mg, Oral, Daily  [Held by provider] metoprolol tartrate (LOPRESSOR) tablet 50 mg, 50 mg, Oral, Daily  [Held by provider] lisinopril-hydroCHLOROthiazide (PRINZIDE;ZESTORETIC) 20-12.5 MG per tablet 1 tablet, 1 tablet, Oral, Daily  therapeutic multivitamin-minerals 1 tablet, 1 tablet, Oral, Daily  sodium chloride flush 0.9 % injection 10 mL, 10 mL, Intravenous, 2 times per day  sodium chloride flush 0.9 % injection 10 mL, 10 mL, Intravenous, PRN  polyethylene glycol (GLYCOLAX) packet 17 g, 17 g, Oral, Daily PRN  promethazine (PHENERGAN) tablet 12.5 mg, 12.5 mg, Oral, Q6H PRN **OR** ondansetron (ZOFRAN) injection 4 mg, 4 mg, Intravenous, Q6H PRN  clopidogrel (PLAVIX) tablet 75 mg, 75 mg, Oral, Daily  0.9 % sodium chloride infusion, , Intravenous, Continuous  atorvastatin (LIPITOR) tablet 80 mg, 80 mg, Oral, Nightly  folic acid (FOLVITE) tablet 1 mg, 1 mg, Oral, LUNGS:  Equal air entry bilaterally, clear   CARDIOVASCULAR:  normal s1 / s2, RRR   ABDOMEN:  Soft, no rigidity   NECK supple, symmetric   EXTREMITIES Normal ROM with no deformities   NEUROLOGIC:  Mental Status:  A & O x3, Awake             Cranial Nerves:    cranial nerves II-XII are grossly intact    Motor Exam:    Drift:  absent  Tone:  normal    Motor exam is 5 out of 5 all extremities with the exception of right upper extremity 4/5 strength without drift, moderate hand grasp. --- Right hand strength improving on exam, close to 5 out of 5 strength. Sensory:    Touch:    Right Upper Extremity:  abnormal - numbness/tingling to right hand  Left Upper Extremity:  normal  Right Lower Extremity:  normal  Left Lower Extremity:  normal    Coordination/Dysmetria:  Heel to Shin:  Right:  normal  Left:  normal  Finger to Nose:   Right:  normal  Left:  normal      SKIN No obvious ecchymosis, rashes, or lesions    NIH Stroke Scale Total (if not done complete detailed one below):    1a.  Level of consciousness:  0 - alert; keenly responsive  1b. Level of consciousness questions:  0 - answers both questions correctly  1c. Level of consciousness questions:  0 - performs both tasks correctly  2. Best Gaze:  0 - normal  3. Visual:  0 - no visual loss  4. Facial Palsy:  0 - normal symmetric movement  5a. Motor left arm:  0 - no drift, limb holds 90 (or 45) degrees for full 10 seconds  5b. Motor right arm:  0 - no drift, limb holds 90 (or 45) degrees for full 10 seconds  6a. Motor left le - no drift; leg holds 30 degree position for full 5 seconds  6b. Motor right le - no drift; leg holds 30 degree position for full 5 seconds  7. Limb Ataxia:  0 - absent  8. Sensory:  1 - mild to moderate sensory loss; patient feels pinprick is less sharp or is dull on the affected side; there is a loss of superficial pain with pinprick but patient is aware of being touched   9.     Best Language:  0 - no aphasia, normal  10. Dysarthria:  0 - normal  11. Extinction and Inattention:  0 - no abnormality   TOTAL: 1    LABS AND IMAGING:     RECENT LABS:  CBC with Differential:    Lab Results   Component Value Date    WBC 11.7 09/20/2020    RBC 4.35 09/20/2020    RBC 3.30 05/08/2012    HGB 13.7 09/20/2020    HCT 40.9 09/20/2020     09/20/2020     05/08/2012    MCV 94.0 09/20/2020    MCH 31.5 09/20/2020    MCHC 33.5 09/20/2020    RDW 12.0 09/20/2020    LYMPHOPCT 10 09/20/2020    MONOPCT 7 09/20/2020    BASOPCT 0 09/20/2020    MONOSABS 0.79 09/20/2020    LYMPHSABS 1.21 09/20/2020    EOSABS 0.05 09/20/2020    BASOSABS 0.03 09/20/2020    DIFFTYPE NOT REPORTED 09/20/2020     BMP:    Lab Results   Component Value Date     09/20/2020    K 4.3 09/20/2020     09/20/2020    CO2 20 09/20/2020    BUN 9 09/20/2020    LABALBU 4.5 03/08/2014    LABALBU 4.1 09/09/2011    CREATININE 0.57 09/20/2020    CALCIUM 8.4 09/20/2020    GFRAA >60 09/20/2020    LABGLOM >60 09/20/2020    GLUCOSE 198 09/20/2020    GLUCOSE 103 05/08/2012       RADIOLOGY:   Ct Head Wo Contrast  Result Date: 9/16/2020  Age-indeterminate right posterior MCA infarction. No acute intracranial hemorrhage. Critical results were called by Dr. Bradley Mccann MD to [facility] on 9/16/2020 at 20:33. Xr Chest Portable  Result Date: 9/16/2020  No acute cardiopulmonary abnormality. Cta Head Neck W Contrast  Result Date: 9/16/2020  1. Mid left common carotid artery lateral wall irregular atherosclerotic plaque versus clot which extends centrally within the lumen causing approximately 60% arterial stenosis over a segment measuring 1.5 cm. 2. Otherwise, unremarkable CT angiogram of the head and neck. 3. Redemonstration of right parietal temporal hypoattenuation and volume loss consistent with subacute to remote infarction. RECOMMENDATIONS: Recommend MRI brain with diffusion-weighted imaging for further evaluation of acute ischemia.      Mri Brain Without Contrast  Result Date: 9/17/2020  Multiple small acute infarcts within the left cerebral hemisphere. The findings were sent to the Radiology Results [address] at 3:33 pm on 9/17/2020to be communicated to a licensed caregiver. Labs and Images reviewed with:    [x] Jesse Combs MD    [] Rupert Goode MD  [] Roger Barillas MD  --[] there are no new interval images to review. ASSESSMENT AND PLAN:       The patient is 59 yo female with a history of HTN, HLD, breast CA and previous CVA (around 3 years ago) who presented with complaints of right hand paresthesias and ataxia. Found to have left common carotid plaque vs thrombus with ~60% stenosis. Loaded with Plavix, continued on Aspirin and started on low dose Heparin infusion.      NEUROLOGIC:  - Multiple scattered small acute left hemispheric infarcts: MRI Brain showed multiple small acute infarcts within the left cerebral hemisphere, consistent with watershed infarct  - Left common carotid plaque vs thrombus with ~60% stenosis   -- CTA showing improvement on L Comm --reduced stenosis to 30%  - Etiology appears cardioembolic   - nev wants Repeat CTA neck in Tuesday evening      - Continue low dose heparin infusion  - Continue Aspirin 81mg QD, Plavix 75mg QD and high dose Lipitor 80mg QHS    - Goal -200  - Ordered hypercoag panel --recorded events on loop recorder 9/20  - Neuro checks per protocol    CARDIOVASCULAR:  - Goal -200  - Hold home antihypertensives for now  - Troponin 6, EKG NSR  - Echo EF 69%, grade I diastolic dysfunction  - Per recent Cardiology note, previous EDGAR negative for PFO     - Loop recorder in place, interrogated-no recorded events   - Lipid panel; LDL 76, Cholesterol 162  - Lipitor 80mg QHS  - Continue telemetry    PULMONARY:  - Maintaining O2 sats on room air  - Baseline CXR with no acute cardiopulmonary abnormality    RENAL/FLUID/ELECTROLYTE:  - Normal renal functioning  - 1.2 fluid restriction   - Adequate urine output per nursing  - ivf dc    Hyponatremia, [id number]  - sodium urin  - serum osm   - Replace electrolytes PRN  - Daily BMP    GI/NUTRITION:  NUTRITION:  DIET CARB CONTROL;   - Tolerating diet  - Bowel regimen: Start Senokot-S daily, Milk of Mag PRN  - GI prophylaxis: Not indicated    ID:  - Afebrile, Tmax 36.8  - leukocyto  - COVID-19 negative  - Continue to monitor for fevers  - Daily CBC    HEME:   - Hyper coag work-up  - H&H stable  - Platelets 208  - Daily CBC    ENDOCRINE:  - Continue to monitor blood glucose, goal <180  - History of Type 2 diabetes  - Hemoglobin A1C 12.2    - Hyperglycemia improved  - Continue High dose insulin sliding scale  - On Tresiba 40u QD and Humalog 9u TID    --Worsening hyperglycemia, starting Lantus 10 BID       OTHER:  - PT/OT/ST    PROPHYLAXIS:  Stress ulcer: N/A    DVT PROPHYLAXIS:  - SCD sleeves - Thigh High   - On Heparin infusion    DISPOSITION: ok for step down tomorrow  - working with  for home discharge with homecare, PT amb >200 ft        Raymon Gruber MD  Neuro Critical Care Service   Pager [phone]  9/20/2020     9:57 AM

## 2020-09-20 NOTE — PROGRESS NOTES
ENDOVASCULAR NEUROSURGERY PROGRESS NOTE  9/20/2020 9:36 AM  Subjective:   Admit Date: 9/16/2020  PCP: ASH Celaya - CNP    No acute events overnight. Neuro exam stable. Repeat CTA neck showed improvement in left common carotid free-floating thrombus. Objective:   Vitals: /79   Pulse 91   Temp 98.2 °F (36.8 °C)   Resp 17   Ht 4' 11\" (1.499 m)   Wt 119 lb 11.4 oz (54.3 kg)   SpO2 96%   BMI 24.18 kg/m²   General appearance: NAD. HEENT: Atraumatic. Neck: Neck is supple. Lungs: No respiratory distress noted. Heart: normal sinus rhythm on tele. .   Abdomen: Soft nontender. Extremities: No lower limb edema noted. Neurologic:awake, following commands, no dysarthria  CN: Has intact extraocular muscles movements, no facial droop noted, no decreased sensation on R face and arm today  MOTOR: Has good strength in both upper and lower extremities, moving both upper and lower extremities against gravity with no drift. SENSORY: Normal sensation in both upper and lower extremities.     Medications and labs:   Scheduled Meds:   aspirin  81 mg Oral Daily    citalopram  20 mg Oral Daily    [Held by provider] metoprolol tartrate  50 mg Oral Daily    [Held by provider] lisinopril-hydroCHLOROthiazide  1 tablet Oral Daily    therapeutic multivitamin-minerals  1 tablet Oral Daily    sodium chloride flush  10 mL Intravenous 2 times per day    clopidogrel  75 mg Oral Daily    atorvastatin  80 mg Oral Nightly    folic acid  1 mg Oral Daily    insulin lispro  0-9 Units Subcutaneous Nightly    insulin lispro  0-18 Units Subcutaneous TID WC     Continuous Infusions:   sodium chloride 100 mL/hr at 09/17/20 0200    dextrose      heparin (porcine) 22 Units/kg/hr (09/19/20 2059)     CBC:   Recent Labs     09/18/20  0928 09/19/20  0222 09/20/20  0305   WBC 8.3 7.4 11.7*   HGB 13.1 13.6 13.7    274 283     BMP:    Recent Labs     09/18/20  0928  09/19/20  0222 09/19/20  0459 09/20/20  0305   NA 130*   < > 135 136 132*   K 3.7  --  3.9  --  4.3     --  102  --  101   CO2 19*  --  20  --  20   BUN 9  --  10  --  9   CREATININE 0.43*  --  0.37*  --  0.57   GLUCOSE 240*  --  125*  --  198*    < > = values in this interval not displayed. Hepatic: No results for input(s): AST, ALT, ALB, BILITOT, ALKPHOS in the last 72 hours. Troponin: No results for input(s): TROPONINI in the last 72 hours. BNP: No results for input(s): BNP in the last 72 hours. Lipids:   No results for input(s): CHOL, HDL in the last 72 hours. Invalid input(s): LDLCALCU  INR:   No results for input(s): INR in the last 72 hours. Assessment and Recommendations:     59-year-old female who is -American with a past medical history including hypertension, depression, hyperlipidemia and history of breast cancer. She had prior history of right MCA/parietal ischemic stroke. She is on aspirin 81 mg daily at home. She presented with acute onset right hand numbness. NIH stroke scale at 1. No TPA given. Vessel images with left common carotid artery clot noted. She was loaded with Plavix 300 mg, continued on aspirin 81 mg daily and started on heparin drip. This morning exam is stable. She no longer tests as decreased sensation of R UE and R face    LDL at 76, HDL at 69, total cholesterol at 162. Transthoracic Echocardiography Report (TTE) 9/17/2020  Global left ventricular systolic function is normal.Estimated ejection   fraction is 50%. Mild inferior wall hypokinesis. Grade I (mild) left ventricular diastolic dysfunction. Normal right ventricular size and function. Calcified mitral valve leaflets with moderate regurgitation. Estimated right ventricular systolic pressure is 27VVWS. No pericardial effusion seen. Repeat CTA neck September 20 showed improvement in left common carotid free-floating thrombus. Continue heparin drip in the meantime. Continue dual antiplatelet therapy.   Repeat CTA neck on Tuesday evening and if clot did not resolve will evaluate possible thrombectomy/stenting. 1. Close monitoring for neurological status. 2. Continue heparin drip. 3. Repeat CTA neck in Tuesday evening. 4. Continue dual antiplatelet therapy with aspirin Plavix. 5. Statin therapy. 6. IV hydration.       Arleth Howell MD  Stroke, [facility]  [id number] [place]  Electronically signed 9/20/2020 at 9:36 AM

## 2020-09-20 NOTE — PLAN OF CARE
DATE: 9/20/2020    AWAKE & FOLLOWING COMMANDS:       [x]? Yes     INTUBATED:              [x]? No        SEDATION/ANALGESIA:      [x]? No Sedation     VASOPRESSORS:    [x]? No      CENTRAL LINES:  [x]? No           ALBERTO CATHETER: [x]? No        FEEDING: Able to take PO? [x]? Carb Control     DVT Prophylaxis:    [x]? Anticoagulation - Heparin Drip     Stress Ulcer Prophylaxis:    [x]? Not indicated     Blood Glucose:  [x]? Blood glucose <180 consistently  [x]? Insulin sliding scale . Will start back Lantus     HOB >30 Degrees:  [x]? Yes       Secondary Stroke Preventions:  [x]? Aspirin 81mg       [x]? Clopidogrel 75mg PO D      [x]?  Atorvastatin 40mg PO HS    Benja Reyes MD [facility]  Adult General Neurology Resident PGY-4  Administrative Chief Resident  9/20/2020 at 1:42 PM

## 2020-09-21 LAB
ABSOLUTE EOS #: 0.12 K/UL (ref 0–0.44)
ABSOLUTE IMMATURE GRANULOCYTE: 0.03 K/UL (ref 0–0.3)
ABSOLUTE LYMPH #: 1.02 K/UL (ref 1.1–3.7)
ABSOLUTE MONO #: 0.54 K/UL (ref 0.1–1.2)
ANION GAP SERPL CALCULATED.3IONS-SCNC: 10 MMOL/L (ref 9–17)
BASOPHILS # BLD: 1 % (ref 0–2)
BASOPHILS ABSOLUTE: 0.04 K/UL (ref 0–0.2)
BUN BLDV-MCNC: 7 MG/DL (ref 8–23)
BUN/CREAT BLD: ABNORMAL (ref 9–20)
CALCIUM SERPL-MCNC: 7.9 MG/DL (ref 8.6–10.4)
CHLORIDE BLD-SCNC: 105 MMOL/L (ref 98–107)
CO2: 20 MMOL/L (ref 20–31)
CREAT SERPL-MCNC: 0.44 MG/DL (ref 0.5–0.9)
DIFFERENTIAL TYPE: ABNORMAL
EOSINOPHILS RELATIVE PERCENT: 2 % (ref 1–4)
GFR AFRICAN AMERICAN: >60 ML/MIN
GFR NON-AFRICAN AMERICAN: >60 ML/MIN
GFR SERPL CREATININE-BSD FRML MDRD: ABNORMAL ML/MIN/{1.73_M2}
GFR SERPL CREATININE-BSD FRML MDRD: ABNORMAL ML/MIN/{1.73_M2}
GLUCOSE BLD-MCNC: 162 MG/DL (ref 65–105)
GLUCOSE BLD-MCNC: 162 MG/DL (ref 65–105)
GLUCOSE BLD-MCNC: 171 MG/DL (ref 70–99)
GLUCOSE BLD-MCNC: 193 MG/DL (ref 65–105)
GLUCOSE BLD-MCNC: 233 MG/DL (ref 65–105)
GLUCOSE BLD-MCNC: 273 MG/DL (ref 65–105)
HCT VFR BLD CALC: 39.4 % (ref 36.3–47.1)
HEMOGLOBIN: 12.4 G/DL (ref 11.9–15.1)
IMMATURE GRANULOCYTES: 1 %
LYMPHOCYTES # BLD: 18 % (ref 24–43)
MCH RBC QN AUTO: 31.6 PG (ref 25.2–33.5)
MCHC RBC AUTO-ENTMCNC: 31.5 G/DL (ref 28.4–34.8)
MCV RBC AUTO: 100.5 FL (ref 82.6–102.9)
MONOCYTES # BLD: 9 % (ref 3–12)
NRBC AUTOMATED: 0 PER 100 WBC
OSMOLALITY URINE: 365 MOSM/KG (ref 80–1300)
PARTIAL THROMBOPLASTIN TIME: 57.7 SEC (ref 20.5–30.5)
PARTIAL THROMBOPLASTIN TIME: 58.8 SEC (ref 20.5–30.5)
PARTIAL THROMBOPLASTIN TIME: 71.7 SEC (ref 20.5–30.5)
PDW BLD-RTO: 12.1 % (ref 11.8–14.4)
PLATELET # BLD: 244 K/UL (ref 138–453)
PLATELET ESTIMATE: ABNORMAL
PMV BLD AUTO: 10.3 FL (ref 8.1–13.5)
POTASSIUM SERPL-SCNC: 3.9 MMOL/L (ref 3.7–5.3)
RBC # BLD: 3.92 M/UL (ref 3.95–5.11)
RBC # BLD: ABNORMAL 10*6/UL
SEG NEUTROPHILS: 70 % (ref 36–65)
SEGMENTED NEUTROPHILS ABSOLUTE COUNT: 4.04 K/UL (ref 1.5–8.1)
SODIUM BLD-SCNC: 135 MMOL/L (ref 135–144)
SODIUM,UR: 86 MMOL/L
WBC # BLD: 5.8 K/UL (ref 3.5–11.3)
WBC # BLD: ABNORMAL 10*3/UL

## 2020-09-21 PROCEDURE — 99232 SBSQ HOSP IP/OBS MODERATE 35: CPT | Performed by: PSYCHIATRY & NEUROLOGY

## 2020-09-21 PROCEDURE — 6370000000 HC RX 637 (ALT 250 FOR IP): Performed by: NURSE PRACTITIONER

## 2020-09-21 PROCEDURE — 97116 GAIT TRAINING THERAPY: CPT

## 2020-09-21 PROCEDURE — 2060000000 HC ICU INTERMEDIATE R&B

## 2020-09-21 PROCEDURE — 6370000000 HC RX 637 (ALT 250 FOR IP): Performed by: STUDENT IN AN ORGANIZED HEALTH CARE EDUCATION/TRAINING PROGRAM

## 2020-09-21 PROCEDURE — 80048 BASIC METABOLIC PNL TOTAL CA: CPT

## 2020-09-21 PROCEDURE — APPNB45 APP NON BILLABLE 31-45 MINUTES: Performed by: NURSE PRACTITIONER

## 2020-09-21 PROCEDURE — 36415 COLL VENOUS BLD VENIPUNCTURE: CPT

## 2020-09-21 PROCEDURE — 99233 SBSQ HOSP IP/OBS HIGH 50: CPT | Performed by: PSYCHIATRY & NEUROLOGY

## 2020-09-21 PROCEDURE — 82947 ASSAY GLUCOSE BLOOD QUANT: CPT

## 2020-09-21 PROCEDURE — 85025 COMPLETE CBC W/AUTO DIFF WBC: CPT

## 2020-09-21 PROCEDURE — 85730 THROMBOPLASTIN TIME PARTIAL: CPT

## 2020-09-21 PROCEDURE — 6360000002 HC RX W HCPCS: Performed by: NURSE PRACTITIONER

## 2020-09-21 RX ORDER — LOPERAMIDE HYDROCHLORIDE 2 MG/1
2 CAPSULE ORAL 4 TIMES DAILY PRN
Status: DISCONTINUED | OUTPATIENT
Start: 2020-09-21 | End: 2020-09-23 | Stop reason: HOSPADM

## 2020-09-21 RX ADMIN — Medication 81 MG: at 09:06

## 2020-09-21 RX ADMIN — CLOPIDOGREL 75 MG: 75 TABLET, FILM COATED ORAL at 09:06

## 2020-09-21 RX ADMIN — INSULIN LISPRO 3 UNITS: 100 INJECTION, SOLUTION INTRAVENOUS; SUBCUTANEOUS at 18:46

## 2020-09-21 RX ADMIN — INSULIN LISPRO 2 UNITS: 100 INJECTION, SOLUTION INTRAVENOUS; SUBCUTANEOUS at 13:27

## 2020-09-21 RX ADMIN — INSULIN GLARGINE 10 UNITS: 100 INJECTION, SOLUTION SUBCUTANEOUS at 09:36

## 2020-09-21 RX ADMIN — FOLIC ACID 1 MG: 1 TABLET ORAL at 09:06

## 2020-09-21 RX ADMIN — HEPARIN SODIUM AND DEXTROSE 18 UNITS/KG/HR: 10000; 5 INJECTION INTRAVENOUS at 06:12

## 2020-09-21 RX ADMIN — INSULIN GLARGINE 10 UNITS: 100 INJECTION, SOLUTION SUBCUTANEOUS at 22:00

## 2020-09-21 RX ADMIN — CITALOPRAM HYDROBROMIDE 20 MG: 20 TABLET ORAL at 09:06

## 2020-09-21 RX ADMIN — LOPERAMIDE HYDROCHLORIDE 2 MG: 2 CAPSULE ORAL at 22:00

## 2020-09-21 RX ADMIN — INSULIN LISPRO 1 UNITS: 100 INJECTION, SOLUTION INTRAVENOUS; SUBCUTANEOUS at 21:59

## 2020-09-21 RX ADMIN — ATORVASTATIN CALCIUM 80 MG: 80 TABLET, FILM COATED ORAL at 22:00

## 2020-09-21 NOTE — PROGRESS NOTES
with moderate regurgitation. Estimated right ventricular systolic pressure is 48LZNI. No pericardial effusion seen.      Repeat CTA neck September 20 showed improvement in left common carotid free-floating thrombus.     PLAN:   1. Close monitoring for neurological status. 2. Continue heparin drip. 3. Repeat CTA neck in Tuesday evening. If clot does not dissolved, will evaluate possible thrombectomy/stenting. 4. Continue dual antiplatelet therapy with aspirin Plavix. 5. Statin therapy. 6. IV hydration. Case discussed with Dr. Siddhartha Hamlin, fellow. Qamar Hong, OMS-4   Stroke, [facility]  [id number] [place]  Electronically signed 9/21/2020 at 9:12 AM     Addendum:     Agree with above. Pt with acute L cerebral strokes, L CCA thrombus. Exam nonfocal. L CCA thrombus improving with medical therapy. Pt will need to be switched to oral AC eventually. Plan as below:  --continue asa 81, plavix 75  --continue hep gtt  --CTA head and neck outpt. No additional imaging needed at this time. --no intervention or DSA at this time. --f/u dr Tim Bingham endovascular clinic 2w after d/c from hospital. F/u dr. Miguel Arora after d/c. CTA head and neck in 2w, prior to clinic visit.  Deanna Fletcher fellow  Case discussed with Dr. Curry Hopper attending.

## 2020-09-21 NOTE — PROGRESS NOTES
Physical Therapy  Facility/Department: [facility] NEURO  Daily Treatment Note  NAME: Ajith Carlson  : 1955  MRN: [id number]    Date of Service: 2020    Discharge Recommendations:  Home with assist PRN        Assessment   Body structures, Functions, Activity limitations: Decreased functional mobility ; Decreased strength;Decreased endurance;Decreased balance  Assessment: Pt amb 220ft SBA and no AD. Performed 5steps CGA no LOB throughout. Will continue to treat in acute with continued PT to address  deficits. PT Education: Goals;Plan of Care;General Safety;Precautions;Gait Training;Functional Mobility Training  REQUIRES PT FOLLOW UP: Yes  Activity Tolerance  Activity Tolerance: Patient Tolerated treatment well     Patient Diagnosis(es): The encounter diagnosis was Cerebrovascular accident (CVA), unspecified mechanism ([facility] [address] has a past medical history of Breast cancer ([facility] [address] Depression, HTN (hypertension), Hyperlipidemia, Ovarian cyst, bilateral, Pancreatitis, and Renal stone. has a past surgical history that includes Inguinal hernia repair; Appendectomy; lymphadenectomy (Bilateral); Tunneled venous port placement; Breast lumpectomy; and Breast surgery. Restrictions  Restrictions/Precautions  Restrictions/Precautions: Up as Tolerated, Fall Risk  Required Braces or Orthoses?: No  Position Activity Restriction  Other position/activity restrictions: 1.2 L fluid restriction  Subjective   General  Chart Reviewed: Yes  Response To Previous Treatment: Patient with no complaints from previous session. Family / Caregiver Present: No  Subjective  Subjective: RN and pt in agreement to PT. Pt supine in bed upon arrival. Pt pleasant and cooperative throughout.   Pain Screening  Patient Currently in Pain: Denies  Vital Signs  Pulse: 81  Patient Currently in Pain: Denies       Orientation  Orientation  Overall Orientation Status: Within Functional Limits  Cognition   Cognition  Overall Cognitive Status: WFL  Objective   Bed mobility  Supine to Sit: Independent  Sit to Supine: Independent  Scooting: Independent  Transfers  Sit to Stand: Supervision  Stand to sit: Supervision  Ambulation  Ambulation?: Yes  Ambulation 1  Surface: level tile  Device: No Device  Assistance: Stand by assistance  Gait Deviations: Slow Elda;Decreased step length;Decreased step height  Distance: 250'  Stairs/Curb  Stairs?: Yes  Stairs  # Steps : 5  Stairs Height: 6\"  Rails: Right ascending  Device: No Device  Assistance: Contact guard assistance  Comment: Ascending- she did step over step. Descending- she was cued to negotiate one at a time. No difficulty observed     Balance  Sitting - Static: Good  Sitting - Dynamic: Good  Standing - Static: Good;-  Standing - Dynamic: Fair;+               Goals  Short term goals  Time Frame for Short term goals: 6 visits  Short term goal 1: Pt to perform bed mobility and functional transfers with independence  Short term goal 2: Pt to ambulate 240 ft SBA and no AD to promote independence. STG 3 met  Short term goal 3: Pt to navigate full flight of stairs (~15) w/o HR and SBA to simulate home environment  Short term goal 4: Pt to tolerate 30 min of PT to demo improved endurance/ decreased fatigue  Short term goal 5: Pt to demo good- standing dynamic balance to reduce risk of falls    Plan    Plan  Times per week: 5x/wk  Times per day: Daily  Specific instructions for Next Treatment: If endurance is sufficient to ensure safety, try stairs  Current Treatment Recommendations: Strengthening, Balance Training, Functional Mobility Training, Endurance Training, Home Exercise Program, Safety Education & Training, Patient/Caregiver Education & Training, Gait Training, Stair training, Transfer Training  Safety Devices  Type of devices:  All fall risk precautions in place, Call light within reach, Left in bed, Gait belt  Restraints  Initially in place: No     Therapy Time   Individual Concurrent Group Co-treatment   Time In 1004         Time Out 1027         Minutes 23         Timed Code Treatment Minutes: DANIEL Henriquez

## 2020-09-21 NOTE — PROGRESS NOTES
Neuro Critical Care  Daily Progress Note    Patient Name: Mamie Smith  Patient : 1955  Room/Bed: 3028/2741-03  Code Status: FULL  Allergies: Allergies   Allergen Reactions    Compazine [Prochlorperazine Maleate]     Prochlorperazine Edisylate     Reglan [Metoclopramide]     Vicodin [Hydrocodone-Acetaminophen]        CHIEF COMPLAINT     Right hand paresthesias    HPI    History Obtained From: Patient, EMR    The patient is a 60 y.o. female with a history of HTN, HLD, breast CA and previous CVA (around 3 years ago) who presented with complaints of right hand paresthesias and ataxia. LKW around 1900. Per records, patient's  noticed patient was not acting like herself and had right sided weakness. Patient reports she was gardening and noticed her right hand wasn't working and felt like it had \"electricity\" running through it. She states she went inside and asked her daughter to call 911 as she had similar symptoms during her last CVA, but on her left side. On arrival to the ED, NIH 1 for sensory. CT Head with no acute abnormality. TPA not administered due to low NIH and no disabling symptoms. CTA Head/Neck showed left common carotid artery lateral wall irregularity, plaque vs clot, causing ~60@ stenosis. Patient was loaded with 300mg Plavix, continued on Aspirin and started on low dose Heparin infusion. She was initially admitted to [facility] under the General Neurology team.  Neuro Critical Care was asked to take over and transfer patient to Neuro ICU for close monitoring as she is on dual antiplatelet therapy and heparin infusion. Of note, patient reports a history of a previous CVA about 3 years ago. She reports her left arm was weak at that time, but has fully recovered. She has a Loop recorder. Hospital Course:   : MRI Brain yesterday showed scattered infarcts within the left cerebral hemisphere consistent with watershed infarct.   Remains on heparin infusion, PTT therapeutic at 54.6 this morning. Clinical exam remains stable, NIH 1 for right hand sensory changes. Q6h sodium checks for hyponatremia. 9/19: Clinical exam stable. Ambulated more than 200 feet with PT. Patient waiting on CTA, remains on low-dose heparin infusion. Remains on aspirin, Plavix. Hyponatremia resolved. Loop recorder with no events. 9/20: RUE strength continues to improve. Lantus started for hyperglycemia. Repeat CTA Head/Neck showed improved left common carotid stenosis down to 30% and decreased size of intraluminal thrombus. Last 24h:  No acute events overnight. Right upper extremity/hand strength improved, patient feels it is almost back to normal but mild sensation deficit persists. NIH 1. Remains on dual antiplatelet therapy and Heparin infusion (PTT 71.7). Plan to repeat CTA Head/Neck again tomorrow. Patient with loose stools, requesting PRN Imodium.       Admitted to ICU From: Neuro Stepdown  Reason for ICU Admission: Close monitoring on dual antiplatelet and heparin infusion       PATIENT HISTORY   Past Medical History:        Diagnosis Date    Breast cancer ([facility] [address]     Depression     HTN (hypertension)     Hyperlipidemia     Ovarian cyst, bilateral     Pancreatitis     Renal stone        Past Surgical History:        Procedure Laterality Date    APPENDECTOMY      BREAST LUMPECTOMY      bilateral breasts    BREAST SURGERY      INGUINAL HERNIA REPAIR      LYMPHADENECTOMY Bilateral     TUNNELED VENOUS PORT PLACEMENT         Social History:   Social History     Socioeconomic History    Marital status: Single     Spouse name: Not on file    Number of children: Not on file    Years of education: Not on file    Highest education level: Not on file   Occupational History     Employer: NONE   Social Needs    Financial resource strain: Not on file    Food insecurity     Worry: Not on file     Inability: Not on file    Transportation needs     Medical: Not on file daily.  [DISCONTINUED] ibuprofen (ADVIL;MOTRIN) 400 MG tablet, TAKE 1 TABLET BY MOUTH EVERY 6 HOURS AS NEEDED FOR PAIN. Blood Pressure KIT, Check blood pressure daily.     Current Medications:  Current Facility-Administered Medications: insulin glargine (LANTUS) injection vial 10 Units, 10 Units, Subcutaneous, BID  aspirin EC tablet 81 mg, 81 mg, Oral, Daily  butalbital-acetaminophen-caffeine (FIORICET, ESGIC) per tablet 1 tablet, 1 tablet, Oral, Q4H PRN  citalopram (CELEXA) tablet 20 mg, 20 mg, Oral, Daily  [Held by provider] metoprolol tartrate (LOPRESSOR) tablet 50 mg, 50 mg, Oral, Daily  [Held by provider] lisinopril-hydroCHLOROthiazide (PRINZIDE;ZESTORETIC) 20-12.5 MG per tablet 1 tablet, 1 tablet, Oral, Daily  therapeutic multivitamin-minerals 1 tablet, 1 tablet, Oral, Daily  sodium chloride flush 0.9 % injection 10 mL, 10 mL, Intravenous, 2 times per day  sodium chloride flush 0.9 % injection 10 mL, 10 mL, Intravenous, PRN  polyethylene glycol (GLYCOLAX) packet 17 g, 17 g, Oral, Daily PRN  promethazine (PHENERGAN) tablet 12.5 mg, 12.5 mg, Oral, Q6H PRN **OR** ondansetron (ZOFRAN) injection 4 mg, 4 mg, Intravenous, Q6H PRN  clopidogrel (PLAVIX) tablet 75 mg, 75 mg, Oral, Daily  atorvastatin (LIPITOR) tablet 80 mg, 80 mg, Oral, Nightly  folic acid (FOLVITE) tablet 1 mg, 1 mg, Oral, Daily  glucose (GLUTOSE) 40 % oral gel 15 g, 15 g, Oral, PRN  dextrose 50 % IV solution, 12.5 g, Intravenous, PRN  glucagon (rDNA) injection 1 mg, 1 mg, Intramuscular, PRN  dextrose 5 % solution, 100 mL/hr, Intravenous, PRN  potassium chloride (KLOR-CON M) extended release tablet 40 mEq, 40 mEq, Oral, PRN **OR** potassium bicarb-citric acid (EFFER-K) effervescent tablet 40 mEq, 40 mEq, Oral, PRN **OR** potassium chloride 10 mEq/100 mL IVPB (Peripheral Line), 10 mEq, Intravenous, PRN  insulin lispro (HUMALOG) injection vial 0-9 Units, 0-9 Units, Subcutaneous, Nightly  insulin lispro (HUMALOG) injection vial 0-18 Units, 0-18 Units, Subcutaneous, TID WC  heparin 25,000 units in dextrose 5% 250 mL infusion, 12 Units/kg/hr, Intravenous, Continuous    PHYSICAL EXAM:     /77   Pulse 81   Temp 98.5 °F (36.9 °C) (Oral)   Resp 12   Ht 4' 11\" (1.499 m)   Wt 119 lb 11.4 oz (54.3 kg)   SpO2 97%   BMI 24.18 kg/m²     PHYSICAL EXAM:  CONSTITUTIONAL:  Well developed, well nourished. Alert and oriented x 3, in no acute distress. GCS 15. Nontoxic. No dysarthria. No aphasia. HEAD:  normocephalic, atraumatic    EYES:  PERRL, EOMI.   ENT:  moist mucous membranes   LUNGS:  Equal air entry bilaterally, clear   CARDIOVASCULAR:  normal s1 / s2, RRR   ABDOMEN:  Soft, no rigidity   NECK supple, symmetric   EXTREMITIES Normal ROM with no deformities   NEUROLOGIC:  Mental Status:  A & O x3, Awake             Cranial Nerves:    cranial nerves II-XII are grossly intact    Motor Exam:    Drift:  absent  Tone:  normal    Motor exam is 5 out of 5 all extremities. Sensory:    Touch:    Right Upper Extremity:  abnormal - numbness/tingling to right hand  Left Upper Extremity:  normal  Right Lower Extremity:  normal  Left Lower Extremity:  normal    Coordination/Dysmetria:  Heel to Shin:  Right:  normal  Left:  normal  Finger to Nose:   Right:  normal  Left:  normal      SKIN No obvious ecchymosis, rashes, or lesions    NIH Stroke Scale Total (if not done complete detailed one below):    1a.  Level of consciousness:  0 - alert; keenly responsive  1b. Level of consciousness questions:  0 - answers both questions correctly  1c. Level of consciousness questions:  0 - performs both tasks correctly  2. Best Gaze:  0 - normal  3. Visual:  0 - no visual loss  4. Facial Palsy:  0 - normal symmetric movement  5a. Motor left arm:  0 - no drift, limb holds 90 (or 45) degrees for full 10 seconds  5b. Motor right arm:  0 - no drift, limb holds 90 (or 45) degrees for full 10 seconds  6a.   Motor left le - no drift; leg holds 30 degree position for full 5 seconds  6b. Motor right le - no drift; leg holds 30 degree position for full 5 seconds  7. Limb Ataxia:  0 - absent  8. Sensory:  1 - mild to moderate sensory loss; patient feels pinprick is less sharp or is dull on the affected side; there is a loss of superficial pain with pinprick but patient is aware of being touched   9. Best Language:  0 - no aphasia, normal  10. Dysarthria:  0 - normal  11. Extinction and Inattention:  0 - no abnormality   TOTAL: 1    LABS AND IMAGING:     RECENT LABS:  CBC with Differential:    Lab Results   Component Value Date    WBC 5.8 2020    RBC 3.92 2020    RBC 3.30 2012    HGB 12.4 2020    HCT 39.4 2020     2020     2012    .5 2020    MCH 31.6 2020    MCHC 31.5 2020    RDW 12.1 2020    LYMPHOPCT 18 2020    MONOPCT 9 2020    BASOPCT 1 2020    MONOSABS 0.54 2020    LYMPHSABS 1.02 2020    EOSABS 0.12 2020    BASOSABS 0.04 2020    DIFFTYPE NOT REPORTED 2020     BMP:    Lab Results   Component Value Date     2020    K 3.9 2020     2020    CO2 20 2020    BUN 7 2020    LABALBU 4.5 2014    LABALBU 4.1 2011    CREATININE 0.44 2020    CALCIUM 7.9 2020    GFRAA >60 2020    LABGLOM >60 2020    GLUCOSE 171 2020    GLUCOSE 103 2012       RADIOLOGY:   Ct Head Wo Contrast  Result Date: 2020  Age-indeterminate right posterior MCA infarction. No acute intracranial hemorrhage. Critical results were called by Dr. Karen Reynolds MD to [facility] on 2020 at 20:33. Cta Head Neck W Contrast  Result Date: 2020  Decrease in left common carotid artery stenosis, now measuring 30% and decrease in size of free-floating intraluminal thrombus. No additional hemodynamically significant stenosis or occlusion in the cervical arterial circulation.  Unremarkable CTA of the head. Cta Head Neck W Contrast  Result Date: 9/16/2020  1. Mid left common carotid artery lateral wall irregular atherosclerotic plaque versus clot which extends centrally within the lumen causing approximately 60% arterial stenosis over a segment measuring 1.5 cm. 2. Otherwise, unremarkable CT angiogram of the head and neck. 3. Redemonstration of right parietal temporal hypoattenuation and volume loss consistent with subacute to remote infarction. RECOMMENDATIONS: Recommend MRI brain with diffusion-weighted imaging for further evaluation of acute ischemia. Mri Brain Without Contrast  Result Date: 9/17/2020  Multiple small acute infarcts within the left cerebral hemisphere. The findings were sent to the Radiology Results [address] at 3:33 pm on 9/17/2020to be communicated to a licensed caregiver. Labs and Images reviewed with:    [] Jesse Mayers MD    [x] Flakito Hernandez MD  [] Shon Perez MD  --[] there are no new interval images to review. ASSESSMENT AND PLAN:       The patient is 59 yo female with a history of HTN, HLD, breast CA and previous CVA (around 3 years ago) who presented with complaints of right hand paresthesias and ataxia. Found to have left common carotid plaque vs thrombus with ~60% stenosis. Loaded with Plavix, continued on Aspirin and started on low dose Heparin infusion.      NEUROLOGIC:  - Multiple scattered small acute left hemispheric infarct  - Left common carotid plaque vs thrombus with ~60% stenosis  - Etiology appears cardioembolic   - Repeat CTA Head/Neck 9/20 showed decrease in size of free floating thrombus and stenosis (now 30%) in the left common carotid   - Continue low dose heparin infusion  - Continue Aspirin 81mg QD, Plavix 75mg QD and high dose Lipitor 80mg QHS  - Neuro Endovascular following; plan for repeat CTA Head/Neck 9/22  - Goal -200  - Loop recorder with no events, Hypercoag panel sent due to unclear etiology of recurrent CVAs  - Neuro checks per protocol    CARDIOVASCULAR:  - Goal -200, avoid symptomatic hypotension  - Hold home antihypertensives for now  - Troponin 6, EKG NSR  - Echo EF 81%, grade I diastolic dysfunction  - Per recent Cardiology note, previous EDGAR negative for PFO  - Loop recorder in place, interrogation showed no events  - Lipid panel; LDL 76, Cholesterol 162  - Lipitor 80mg QHS  - Continue telemetry    PULMONARY:  - Maintaining O2 sats on room air    RENAL/FLUID/ELECTROLYTE:  - Normal renal functioning  - BUN 7/ Creatinine 0.44  - Adequate urine output per nursing  - Hyponatremia possible secondary to SIAHD, resolved  - Sodium 135  - Continue on 1.2L free water restriction for now  - Replace electrolytes PRN  - Daily BMP    GI/NUTRITION:  NUTRITION:  DIET CARB CONTROL; Daily Fluid Restriction: 1200 ml   - Tolerating diet  - Complaining of loose stools; Imodium PRN  - Bowel regimen: HELD  - GI prophylaxis: Not indicated    ID:  - Afebrile, Tmax 37.5C  - No leukocytosis, WBC 5.8  - COVID-19 negative  - Continue to monitor for fevers  - Daily CBC    HEME:   - H&H 12.4/39.1  - Platelets 611  - Daily CBC    ENDOCRINE:  - Continue to monitor blood glucose, goal <180  - History of Type 2 diabetes  - Hemoglobin A1C 12.2  - Continued hyperglycemia  - Continue Lantus 10u BID, consider increasing based on glucose trend  - Low insulin sliding scale, patient refusing High dose    OTHER:  - PT/OT/ST  - Discharge planning; likely home with possible outpatient OT    PROPHYLAXIS:  Stress ulcer: N/A    DVT PROPHYLAXIS:  - SCD sleeves - Thigh High   - On Heparin infusion    DISPOSITION: Admit to Neuro ICU for close neurological monitoring.         [facility] [place]  Neuro Critical Care Service   Pager [phone]  9/21/2020     8:36 AM

## 2020-09-21 NOTE — PLAN OF CARE
Problem: Falls - Risk of:  Goal: Will remain free from falls  Description: Will remain free from falls  9/21/2020 1757 by Edwin Yee RN  Outcome: Met This Shift  9/21/2020 0632 by Lola Pritchett RN  Outcome: Met This Shift  Goal: Absence of physical injury  Description: Absence of physical injury  Outcome: Met This Shift     Problem: HEMODYNAMIC STATUS  Goal: Patient has stable vital signs and fluid balance  9/21/2020 0632 by Lola Pritchett RN  Outcome: Met This Shift     Problem: ACTIVITY INTOLERANCE/IMPAIRED MOBILITY  Goal: Mobility/activity is maintained at optimum level for patient  9/21/2020 1757 by Edwin Yee RN  Outcome: Met This Shift  9/21/2020 0632 by Lola Pritchett RN  Outcome: Met This Shift     Problem: COMMUNICATION IMPAIRMENT  Goal: Ability to express needs and understand communication  Outcome: Met This Shift     Problem: Musculor/Skeletal Functional Status  Goal: Highest potential functional level  Outcome: Met This Shift  Goal: Absence of falls  Outcome: Met This Shift

## 2020-09-22 ENCOUNTER — APPOINTMENT (OUTPATIENT)
Dept: CT IMAGING | Age: 65
DRG: 065 | End: 2020-09-22
Payer: COMMERCIAL

## 2020-09-22 LAB
ABSOLUTE EOS #: 0.16 K/UL (ref 0–0.44)
ABSOLUTE IMMATURE GRANULOCYTE: <0.03 K/UL (ref 0–0.3)
ABSOLUTE LYMPH #: 1.48 K/UL (ref 1.1–3.7)
ABSOLUTE MONO #: 0.68 K/UL (ref 0.1–1.2)
ANION GAP SERPL CALCULATED.3IONS-SCNC: 11 MMOL/L (ref 9–17)
BASOPHILS # BLD: 1 % (ref 0–2)
BASOPHILS ABSOLUTE: 0.04 K/UL (ref 0–0.2)
BUN BLDV-MCNC: 9 MG/DL (ref 8–23)
BUN/CREAT BLD: ABNORMAL (ref 9–20)
CALCIUM IONIZED: 1.05 MMOL/L (ref 1.13–1.33)
CALCIUM SERPL-MCNC: 8.2 MG/DL (ref 8.6–10.4)
CHLORIDE BLD-SCNC: 107 MMOL/L (ref 98–107)
CO2: 20 MMOL/L (ref 20–31)
CREAT SERPL-MCNC: 0.44 MG/DL (ref 0.5–0.9)
DIFFERENTIAL TYPE: ABNORMAL
EOSINOPHILS RELATIVE PERCENT: 3 % (ref 1–4)
GFR AFRICAN AMERICAN: >60 ML/MIN
GFR NON-AFRICAN AMERICAN: >60 ML/MIN
GFR SERPL CREATININE-BSD FRML MDRD: ABNORMAL ML/MIN/{1.73_M2}
GFR SERPL CREATININE-BSD FRML MDRD: ABNORMAL ML/MIN/{1.73_M2}
GLUCOSE BLD-MCNC: 123 MG/DL (ref 65–105)
GLUCOSE BLD-MCNC: 130 MG/DL (ref 70–99)
GLUCOSE BLD-MCNC: 196 MG/DL (ref 65–105)
GLUCOSE BLD-MCNC: 217 MG/DL (ref 65–105)
GLUCOSE BLD-MCNC: 364 MG/DL (ref 65–105)
HCT VFR BLD CALC: 38.4 % (ref 36.3–47.1)
HEMOGLOBIN: 12.7 G/DL (ref 11.9–15.1)
IMMATURE GRANULOCYTES: 0 %
LYMPHOCYTES # BLD: 29 % (ref 24–43)
MCH RBC QN AUTO: 31.1 PG (ref 25.2–33.5)
MCHC RBC AUTO-ENTMCNC: 33.1 G/DL (ref 28.4–34.8)
MCV RBC AUTO: 94.1 FL (ref 82.6–102.9)
MONOCYTES # BLD: 13 % (ref 3–12)
NRBC AUTOMATED: 0 PER 100 WBC
PARTIAL THROMBOPLASTIN TIME: 46.7 SEC (ref 20.5–30.5)
PARTIAL THROMBOPLASTIN TIME: 49.9 SEC (ref 20.5–30.5)
PARTIAL THROMBOPLASTIN TIME: 53.1 SEC (ref 20.5–30.5)
PARTIAL THROMBOPLASTIN TIME: 61.1 SEC (ref 20.5–30.5)
PARTIAL THROMBOPLASTIN TIME: 61.7 SEC (ref 20.5–30.5)
PDW BLD-RTO: 12.1 % (ref 11.8–14.4)
PLATELET # BLD: 272 K/UL (ref 138–453)
PLATELET ESTIMATE: ABNORMAL
PMV BLD AUTO: 10.2 FL (ref 8.1–13.5)
POTASSIUM SERPL-SCNC: 4.1 MMOL/L (ref 3.7–5.3)
RBC # BLD: 4.08 M/UL (ref 3.95–5.11)
RBC # BLD: ABNORMAL 10*6/UL
SEG NEUTROPHILS: 54 % (ref 36–65)
SEGMENTED NEUTROPHILS ABSOLUTE COUNT: 2.75 K/UL (ref 1.5–8.1)
SODIUM BLD-SCNC: 138 MMOL/L (ref 135–144)
WBC # BLD: 5.1 K/UL (ref 3.5–11.3)
WBC # BLD: ABNORMAL 10*3/UL

## 2020-09-22 PROCEDURE — 82330 ASSAY OF CALCIUM: CPT

## 2020-09-22 PROCEDURE — 99233 SBSQ HOSP IP/OBS HIGH 50: CPT | Performed by: PSYCHIATRY & NEUROLOGY

## 2020-09-22 PROCEDURE — 70498 CT ANGIOGRAPHY NECK: CPT

## 2020-09-22 PROCEDURE — APPNB45 APP NON BILLABLE 31-45 MINUTES: Performed by: NURSE PRACTITIONER

## 2020-09-22 PROCEDURE — 97110 THERAPEUTIC EXERCISES: CPT

## 2020-09-22 PROCEDURE — 6360000004 HC RX CONTRAST MEDICATION: Performed by: INTERNAL MEDICINE

## 2020-09-22 PROCEDURE — 82947 ASSAY GLUCOSE BLOOD QUANT: CPT

## 2020-09-22 PROCEDURE — 36415 COLL VENOUS BLD VENIPUNCTURE: CPT

## 2020-09-22 PROCEDURE — 99232 SBSQ HOSP IP/OBS MODERATE 35: CPT | Performed by: PSYCHIATRY & NEUROLOGY

## 2020-09-22 PROCEDURE — 6360000002 HC RX W HCPCS: Performed by: NURSE PRACTITIONER

## 2020-09-22 PROCEDURE — 97535 SELF CARE MNGMENT TRAINING: CPT

## 2020-09-22 PROCEDURE — 85730 THROMBOPLASTIN TIME PARTIAL: CPT

## 2020-09-22 PROCEDURE — 76937 US GUIDE VASCULAR ACCESS: CPT

## 2020-09-22 PROCEDURE — 85025 COMPLETE CBC W/AUTO DIFF WBC: CPT

## 2020-09-22 PROCEDURE — 6370000000 HC RX 637 (ALT 250 FOR IP): Performed by: STUDENT IN AN ORGANIZED HEALTH CARE EDUCATION/TRAINING PROGRAM

## 2020-09-22 PROCEDURE — 6370000000 HC RX 637 (ALT 250 FOR IP): Performed by: NURSE PRACTITIONER

## 2020-09-22 PROCEDURE — 2060000000 HC ICU INTERMEDIATE R&B

## 2020-09-22 PROCEDURE — 80048 BASIC METABOLIC PNL TOTAL CA: CPT

## 2020-09-22 PROCEDURE — 97116 GAIT TRAINING THERAPY: CPT

## 2020-09-22 RX ORDER — INSULIN GLARGINE 100 [IU]/ML
10 INJECTION, SOLUTION SUBCUTANEOUS NIGHTLY
Status: DISCONTINUED | OUTPATIENT
Start: 2020-09-23 | End: 2020-09-23 | Stop reason: HOSPADM

## 2020-09-22 RX ORDER — INSULIN GLARGINE 100 [IU]/ML
15 INJECTION, SOLUTION SUBCUTANEOUS EVERY MORNING
Status: DISCONTINUED | OUTPATIENT
Start: 2020-09-23 | End: 2020-09-23 | Stop reason: HOSPADM

## 2020-09-22 RX ADMIN — Medication 81 MG: at 08:56

## 2020-09-22 RX ADMIN — INSULIN GLARGINE 10 UNITS: 100 INJECTION, SOLUTION SUBCUTANEOUS at 08:58

## 2020-09-22 RX ADMIN — INSULIN LISPRO 5 UNITS: 100 INJECTION, SOLUTION INTRAVENOUS; SUBCUTANEOUS at 13:18

## 2020-09-22 RX ADMIN — HEPARIN SODIUM AND DEXTROSE 18 UNITS/KG/HR: 10000; 5 INJECTION INTRAVENOUS at 10:59

## 2020-09-22 RX ADMIN — INSULIN LISPRO 1 UNITS: 100 INJECTION, SOLUTION INTRAVENOUS; SUBCUTANEOUS at 22:15

## 2020-09-22 RX ADMIN — IOHEXOL 90 ML: 350 INJECTION, SOLUTION INTRAVENOUS at 23:20

## 2020-09-22 RX ADMIN — CITALOPRAM HYDROBROMIDE 20 MG: 20 TABLET ORAL at 08:57

## 2020-09-22 RX ADMIN — FOLIC ACID 1 MG: 1 TABLET ORAL at 08:56

## 2020-09-22 RX ADMIN — ATORVASTATIN CALCIUM 80 MG: 80 TABLET, FILM COATED ORAL at 22:16

## 2020-09-22 RX ADMIN — CLOPIDOGREL 75 MG: 75 TABLET, FILM COATED ORAL at 08:57

## 2020-09-22 ASSESSMENT — PAIN SCALES - GENERAL
PAINLEVEL_OUTOF10: 0

## 2020-09-22 NOTE — PROGRESS NOTES
Occupational Therapy  Facility/Department: [facility] NEURO  Daily Treatment Note  NAME: Ernesto Leon  : 1955  MRN: [id number]    Date of Service: 2020    Discharge Recommendations:  No therapy recommended at discharge. Assessment   Performance deficits / Impairments: Decreased functional mobility ; Decreased endurance;Decreased ADL status; Decreased sensation;Decreased high-level IADLs;Decreased fine motor control  Prognosis: Good  Decision Making: Medium Complexity  OT Education: OT Role;Plan of Care  REQUIRES OT FOLLOW UP: Yes  Activity Tolerance  Activity Tolerance: Patient Tolerated treatment well  Safety Devices  Safety Devices in place: Yes  Type of devices: All fall risk precautions in place; Left in bed;Call light within reach  Restraints  Initially in place: No         Patient Diagnosis(es): The encounter diagnosis was Cerebrovascular accident (CVA), unspecified mechanism ([facility] [address] has a past medical history of Breast cancer ([facility] [address] Depression, HTN (hypertension), Hyperlipidemia, Ovarian cyst, bilateral, Pancreatitis, and Renal stone. has a past surgical history that includes Inguinal hernia repair; Appendectomy; lymphadenectomy (Bilateral); Tunneled venous port placement; Breast lumpectomy; and Breast surgery. Restrictions  Restrictions/Precautions  Restrictions/Precautions: Up as Tolerated, Fall Risk  Required Braces or Orthoses?: No  Position Activity Restriction    Subjective   General  Patient assessed for rehabilitation services?: Yes  Family / Caregiver Present: Yes(significant other)  Diagnosis: CVA  General Comment  Comments: RN ok'd for OT tx. Pt agreeable and pleasant throughout session. Pain Assessment  Pain Assessment: 0-10  Pain Level: 0  Vital Signs  Patient Currently in Pain: No      Objective    ADL  Grooming: Setup;Stand by assistance  Toileting: Stand by assistance;Setup  Additional Comments: Pt lying supine in bed upon arrival. Pt transferred to EOB with mod (I).  Pt completed func mob to/from bathroom with no device at SBA, no LOB noted. Pt used toilet for successful urination, pt wiped bottom with wet wipe with SBA and setup, d/t bar by toilet in way. Pt completed face washing standing sinkside with SBA. Pt returned to bed with SBA and completed bed mobility with mod (I). Pt reports no concerns at this time. No decreased fine motor control observed during session. Balance  Sitting Balance: Modified independent   Standing Balance: Stand by assistance  Standing Balance  Time: ~4 mins  Activity: Pt stood EOB, toiletside and sinkside.   Functional Mobility  Functional - Mobility Device: No device  Activity: To/from bathroom  Assist Level: Stand by assistance  Toilet Transfers  Toilet - Technique: Ambulating  Equipment Used: standard toilet seat with rails  Toilet Transfer: Stand by assistance  Bed mobility  Supine to Sit: Modified independent  Sit to Supine: Modified independent  Scooting: Modified independent  Transfers  Sit to stand: Stand by assistance  Stand to sit: Stand by assistance        Cognition  Overall Cognitive Status: [facility]           Plan   Plan  Times per week: 1-2 sessions  Current Treatment Recommendations: Functional Mobility Training, Endurance Training, Safety Education & Training, Self-Care / ADL(fine motor coordination)    Goals  Short term goals  Time Frame for Short term goals: By discharge, pt will  Short term goal 1: demo I in UE/LE ADLs  Short term goal 2: demo I in functional mobility/transfers with good safety awareness  Short term goal 3: demo WFL fine motor coordination in R hand during ADLs/functional activities  Short term goal 4: demo 8+ min dynamic standing during ADLs/functional activities  Short term goal 5: demo understanding of and I in safety awareness, fall prevention and ECWS during ADLs/ functional activities       Therapy Time   Individual Concurrent Group Co-treatment   Time In 1512         Time Out 1525         Minutes 13 Timed Code Treatment Minutes: [id number], OT/S

## 2020-09-22 NOTE — PROGRESS NOTES
Endovascular Neurosurgery Progress Note    SUBJECTIVE:     Denies paresthesia or weakness. Improved R sided facial and arm paresthsia and R wrist weakness previous noted. Neuro exam stable - no FND. Denied any additional episodes of diarrhea after imodium yesterday. Review of Systems:  CONSTITUTIONAL:  negative for fevers, chills, fatigue and malaise    EYES:  negative for double vision, blurred vision and photophobia     HEENT:  negative for tinnitus, epistaxis and sore throat    RESPIRATORY:  negative for cough, shortness of breath, wheezing    CARDIOVASCULAR:  negative for chest pain, palpitations, syncope, edema    GASTROINTESTINAL:  negative for abdominal pain, N/V   GENITOURINARY:  negative for incontinence    MUSCULOSKELETAL:  negative for neck or back pain    NEUROLOGICAL:  Negative for weakness and tingling  negative for headaches and dizziness    PSYCHIATRIC:  negative for anxiety      Review of systems otherwise negative. OBJECTIVE:     Vitals:    09/22/20 0432   BP: (!) 146/76   Pulse: 72   Resp: 9   Temp: 98.1 °F (36.7 °C)   SpO2:         General:  Gen: normal habitus, NAD  HEENT: NCAT, mucosa moist  Cvs: RRR, S1 S2 normal  Resp: symmetric unlabored breathing  Abd: s/nd/nt  Ext: no edema  Skin: no lesions seen, warm and dry    Neuro:  Gen: awake and alert, oriented x3. Lang/speech: no aphasia or dysarthria. Follows commands. CN: PERRL, EOMI, VFF, V1-3 intact, face symmetric, hearing intact, shoulder shrug symmetric, tongue midline  Motor: grossly 5/5 UE and LE b/l  Sense: LT intact in all 4 ext. Coord: FTN and HTS intact b/l  DTR: deferred  Gait: deferred    NIH Stroke Scale:   1a  Level of consciousness: 0 - alert; keenly responsive   1b. LOC questions:  0 - answers both questions correctly   1c. LOC commands: 0 - performs both tasks correctly   2. Best Gaze: 0 - normal   3. Visual: 0 - no visual loss   4. Facial Palsy: 0 - normal symmetric movement   5a.  Motor left arm: 0 - no drift, limb holds 90 (or 45) degrees for full 10 seconds   5b. Motor right arm: 0 - no drift, limb holds 90 (or 45) degrees for full 10 seconds   6a. Motor left le - no drift; leg holds 30 degree position for full 5 seconds   6b  Motor right le - no drift; leg holds 30 degree position for full 5 seconds   7. Limb Ataxia: 0 - absent   8. Sensory: 0 - normal; no sensory loss   9. Best Language:  0 - no aphasia, normal   10. Dysarthria: 0 - normal   11. Extinction and Inattention: 0 - no abnormality         Total:   0     MRS: 1      LABS:   Reviewed. RADIOLOGY:   Images were personally reviewed including:    CTA head and neck W contrast (2020)      CTA head and neck W contrast (2020)  Decrease in left common carotid artery stenosis, now measuring 30% and    decrease in size of free-floating intraluminal thrombus.         No additional hemodynamically significant stenosis or occlusion in the    cervical arterial circulation.         Unremarkable CTA of the head. ASSESSMENT:   Patient is a 56-year-old female who is -American with a past medical history including hypertension, depression, hyperlipidemia and history of breast cancer. She had prior history of right MCA/parietal ischemic stroke. She is on aspirin 81 mg daily at home. She presented with acute onset right hand numbness. NIH stroke scale at 1. No TPA given. Vessel images with left common carotid artery clot noted. She was loaded with Plavix 300 mg, continued on aspirin 81 mg daily and started on heparin drip.     This morning exam is stable. She no longer tests as decreased sensation of R UE and R face     LDL at 76, HDL at 69, total cholesterol at 162.     Transthoracic Echocardiography Report (TTE) 2020  Global left ventricular systolic function is normal.Estimated ejection   fraction is 50%. Mild inferior wall hypokinesis. Grade I (mild) left ventricular diastolic dysfunction.    Normal right ventricular size and function. Calcified mitral valve leaflets with moderate regurgitation. Estimated right ventricular systolic pressure is [id number]. No pericardial effusion seen.      Repeat CTA neck September 20 showed improvement in left common carotid free-floating thrombus.     PLAN:   1. Close monitoring for neurological status. 2. Continue heparin drip. 3. Repeat CTA neck this evening, scheduled at 6 PM.   4. Continue dual antiplatelet therapy with aspirin Plavix. 5. Statin therapy. 6. IV hydration. Case discussed with Dr. Mike Parnell, fellow. [place], S-4   Stroke, [facility]  [id number]  Electronically signed 9/22/2020 at 7:48 AM     Addendum:    Agree with above. Pt with acute L cerebral strokes, L CCA thrombus. Exam nonfocal. L CCA thrombus improving with medical therapy. Pt will need to be switched to oral AC eventually. Plan as below:  --continue asa 81, plavix 75  --continue hep gtt  --CTA head and neck outpt. No additional imaging needed at this time. --no intervention or DSA at this time. --f/u dr Marina Casillas endovascular clinic 2w after d/c from hospital. F/u dr. Juan Mcdermott after d/c. CTA head and neck in 2w, prior to clinic visit. Jacinto Trinidad fellow  Case discussed with Dr. Surya Pineda attending.

## 2020-09-22 NOTE — PROCEDURES
PATIENT HEALTH QUESTIONNAIRE-9                                            (PHQ-9)    Over the past 2 weeks, how often have you been bothered by any of the following problems?     -------------------------------------------------------------------------------------------------------------------  1. Little interest or pleasure in doing things   0x 1 2 3  -------------------------------------------------------------------------------------------------------------------  2. Feeling down, depressed or hopeless   0x 1 2 3  -------------------------------------------------------------------------------------------------------------------  3. Trouble falling or staying asleep, or sleeping too much  0x 1 2 3  Only here   -------------------------------------------------------------------------------------------------------------------  4. Feeling tired or having little energy   0x 1 2 3  -------------------------------------------------------------------------------------------------------------------  5. Poor appetite or overeating    0 1x 2 3  Not here the food is terrible  -------------------------------------------------------------------------------------------------------------------  6. Feeling bad about yourself--or that you are      A failure or have let yourself or family down. 0x 1 2 3   ------------------------------------------------------------------------------------------------------------------  7. Trouble concentrating on things, such as reading        the newspaper or watching T.V.     0x 1 2 3  ------------------------------------------------------------------------------------------------------------------  8. Moving or speaking so slowly that other people could noticed?         Or the opposite-- being so fidgety or restless that you have been  0x 1 2 3      moving around a lot more than usual  -------------------------------------------------------------------------------------------------------------------------------------------  9. Thoughts that you would be better off dead or of hurting   0x 1 2 3       yourself in some way  ------------------------------------------------------------------------------------------------------------------------------------------              ---------------------------------------------------                   Total score______1_________    ---------------------------------------------------------------------------------------------------------------------------------------------  If you checked off any problems, how difficult have these problems made it for you to do your work, take care of things at home, or get along with other people?      Not difficult at all    ______       Somewhat difficult ______    Very difficult  _____    Extremely difficult ______

## 2020-09-22 NOTE — PROGRESS NOTES
Neuro Critical Care  Daily Progress Note    Patient Name: Hermila Macias  Patient : 1955  Room/Bed: 1070/8506-93  Code Status: FULL  Allergies: Allergies   Allergen Reactions    Compazine [Prochlorperazine Maleate]     Prochlorperazine Edisylate     Reglan [Metoclopramide]     Vicodin [Hydrocodone-Acetaminophen]        CHIEF COMPLAINT     Right hand paresthesias    HPI    History Obtained From: Patient, EMR    The patient is a 60 y.o. female with a history of HTN, HLD, breast CA and previous CVA (around 3 years ago) who presented with complaints of right hand paresthesias and ataxia. LKW around 1900. Per records, patient's  noticed patient was not acting like herself and had right sided weakness. Patient reports she was gardening and noticed her right hand wasn't working and felt like it had \"electricity\" running through it. She states she went inside and asked her daughter to call 911 as she had similar symptoms during her last CVA, but on her left side. On arrival to the ED, NIH 1 for sensory. CT Head with no acute abnormality. TPA not administered due to low NIH and no disabling symptoms. CTA Head/Neck showed left common carotid artery lateral wall irregularity, plaque vs clot, causing ~60@ stenosis. Patient was loaded with 300mg Plavix, continued on Aspirin and started on low dose Heparin infusion. She was initially admitted to [facility] under the General Neurology team.  Neuro Critical Care was asked to take over and transfer patient to Neuro ICU for close monitoring as she is on dual antiplatelet therapy and heparin infusion. Of note, patient reports a history of a previous CVA about 3 years ago. She reports her left arm was weak at that time, but has fully recovered. She has a Loop recorder. Hospital Course:   : MRI Brain yesterday showed scattered infarcts within the left cerebral hemisphere consistent with watershed infarct.   Remains on heparin infusion, PTT therapeutic at 54.6 this morning. Clinical exam remains stable, NIH 1 for right hand sensory changes. Q6h sodium checks for hyponatremia. 9/19: Clinical exam stable. Ambulated more than 200 feet with PT. Patient waiting on CTA, remains on low-dose heparin infusion. Remains on aspirin, Plavix. Hyponatremia resolved. Loop recorder with no events. 9/20: RUE strength continues to improve. Lantus started for hyperglycemia. Repeat CTA Head/Neck showed improved left common carotid stenosis down to 30% and decreased size of intraluminal thrombus. 9/21:   Right upper extremity/hand strength improved, patient feels it is almost back to normal but mild sensation deficit persists. NIH 1. Remains on dual antiplatelet therapy and Heparin infusion (PTT 71.7). Plan to repeat CTA Head/Neck again tomorrow. Patient with loose stools, requesting PRN Imodium. Last 24h:  No acute events overnight. Clinical exam remains stable, continues to have mild sensory issue in the right hand. Remains on heparin infusion (PTT 61.7) and dual antiplatelet. Plan for repeat CTA Head/Neck tonight to re-evaluate thrombus.     Admitted to ICU From: Neuro Stepdown  Reason for ICU Admission: Close monitoring on dual antiplatelet and heparin infusion       PATIENT HISTORY   Past Medical History:        Diagnosis Date    Breast cancer ([facility] [address]     Depression     HTN (hypertension)     Hyperlipidemia     Ovarian cyst, bilateral     Pancreatitis     Renal stone        Past Surgical History:        Procedure Laterality Date    APPENDECTOMY      BREAST LUMPECTOMY      bilateral breasts    BREAST SURGERY      INGUINAL HERNIA REPAIR      LYMPHADENECTOMY Bilateral     TUNNELED VENOUS PORT PLACEMENT         Social History:   Social History     Socioeconomic History    Marital status: Single     Spouse name: Not on file    Number of children: Not on file    Years of education: Not on file    Highest education level: Not on file Occupational History     Employer: NONE   Social Needs    Financial resource strain: Not on file    Food insecurity     Worry: Not on file     Inability: Not on file    Transportation needs     Medical: Not on file     Non-medical: Not on file   Tobacco Use    Smoking status: Never Smoker    Smokeless tobacco: Never Used   Substance and Sexual Activity    Alcohol use: Yes     Comment: rare, USED TO DRINK 2 BOTTLES OF WINE/WK QUIT LAST FRI    Drug use: No    Sexual activity: Yes     Partners: Male   Lifestyle    Physical activity     Days per week: Not on file     Minutes per session: Not on file    Stress: Not on file   Relationships    Social connections     Talks on phone: Not on file     Gets together: Not on file     Attends Gnosticism service: Not on file     Active member of club or organization: Not on file     Attends meetings of clubs or organizations: Not on file     Relationship status: Not on file    Intimate partner violence     Fear of current or ex partner: Not on file     Emotionally abused: Not on file     Physically abused: Not on file     Forced sexual activity: Not on file   Other Topics Concern    Not on file   Social History Narrative    ** Merged History Encounter **            Family History:       Problem Relation Age of Onset    Coronary Art Dis Mother     Heart Disease Mother     Coronary Art Dis Father     Diabetes Father     High Blood Pressure Father     Coronary Art Dis Sister        Allergies:    Compazine [prochlorperazine maleate];  Prochlorperazine edisylate; Reglan [metoclopramide]; and Vicodin [hydrocodone-acetaminophen]    Medications Prior to Admission:    Medications Prior to Admission: lisinopril-hydrochlorothiazide (PRINZIDE;ZESTORETIC) 20-12.5 MG per tablet, Take 1 tablet by mouth daily  aspirin 81 MG tablet, Take 81 mg by mouth daily  butalbital-APAP-caffeine (FIORICET) -40 MG CAPS per capsule, Take 1 capsule by mouth every 4 hours as needed for Headaches  metoprolol (LOPRESSOR) 50 MG tablet, Take 1 tablet by mouth daily. Multiple Vitamin (MULTIVITAMIN) tablet, Take 1 tablet by mouth daily. citalopram (CELEXA) 20 MG tablet, Take 1 tablet by mouth daily. [DISCONTINUED] ibuprofen (ADVIL;MOTRIN) 400 MG tablet, TAKE 1 TABLET BY MOUTH EVERY 6 HOURS AS NEEDED FOR PAIN. Blood Pressure KIT, Check blood pressure daily.     Current Medications:  Current Facility-Administered Medications: insulin lispro (HUMALOG) injection vial 0-6 Units, 0-6 Units, Subcutaneous, TID WC  insulin lispro (HUMALOG) injection vial 0-3 Units, 0-3 Units, Subcutaneous, Nightly  loperamide (IMODIUM) capsule 2 mg, 2 mg, Oral, 4x Daily PRN  insulin glargine (LANTUS) injection vial 10 Units, 10 Units, Subcutaneous, BID  aspirin EC tablet 81 mg, 81 mg, Oral, Daily  butalbital-acetaminophen-caffeine (FIORICET, ESGIC) per tablet 1 tablet, 1 tablet, Oral, Q4H PRN  citalopram (CELEXA) tablet 20 mg, 20 mg, Oral, Daily  [Held by provider] metoprolol tartrate (LOPRESSOR) tablet 50 mg, 50 mg, Oral, Daily  [Held by provider] lisinopril-hydroCHLOROthiazide (PRINZIDE;ZESTORETIC) 20-12.5 MG per tablet 1 tablet, 1 tablet, Oral, Daily  therapeutic multivitamin-minerals 1 tablet, 1 tablet, Oral, Daily  sodium chloride flush 0.9 % injection 10 mL, 10 mL, Intravenous, 2 times per day  sodium chloride flush 0.9 % injection 10 mL, 10 mL, Intravenous, PRN  polyethylene glycol (GLYCOLAX) packet 17 g, 17 g, Oral, Daily PRN  promethazine (PHENERGAN) tablet 12.5 mg, 12.5 mg, Oral, Q6H PRN **OR** ondansetron (ZOFRAN) injection 4 mg, 4 mg, Intravenous, Q6H PRN  clopidogrel (PLAVIX) tablet 75 mg, 75 mg, Oral, Daily  atorvastatin (LIPITOR) tablet 80 mg, 80 mg, Oral, Nightly  folic acid (FOLVITE) tablet 1 mg, 1 mg, Oral, Daily  glucose (GLUTOSE) 40 % oral gel 15 g, 15 g, Oral, PRN  dextrose 50 % IV solution, 12.5 g, Intravenous, PRN  glucagon (rDNA) injection 1 mg, 1 mg, Intramuscular, PRN  dextrose 5 % solution, 100 Facial Palsy:  0 - normal symmetric movement  5a. Motor left arm:  0 - no drift, limb holds 90 (or 45) degrees for full 10 seconds  5b. Motor right arm:  0 - no drift, limb holds 90 (or 45) degrees for full 10 seconds  6a. Motor left le - no drift; leg holds 30 degree position for full 5 seconds  6b. Motor right le - no drift; leg holds 30 degree position for full 5 seconds  7. Limb Ataxia:  0 - absent  8. Sensory:  1 - mild to moderate sensory loss; patient feels pinprick is less sharp or is dull on the affected side; there is a loss of superficial pain with pinprick but patient is aware of being touched   9. Best Language:  0 - no aphasia, normal  10. Dysarthria:  0 - normal  11. Extinction and Inattention:  0 - no abnormality   TOTAL: 1    LABS AND IMAGING:     RECENT LABS:  CBC with Differential:    Lab Results   Component Value Date    WBC 5.1 2020    RBC 4.08 2020    RBC 3.30 2012    HGB 12.7 2020    HCT 38.4 2020     2020     2012    MCV 94.1 2020    MCH 31.1 2020    MCHC 33.1 2020    RDW 12.1 2020    LYMPHOPCT 29 2020    MONOPCT 13 2020    BASOPCT 1 2020    MONOSABS 0.68 2020    LYMPHSABS 1.48 2020    EOSABS 0.16 2020    BASOSABS 0.04 2020    DIFFTYPE NOT REPORTED 2020     BMP:    Lab Results   Component Value Date     2020    K 4.1 2020     2020    CO2 20 2020    BUN 9 2020    LABALBU 4.5 2014    LABALBU 4.1 2011    CREATININE 0.44 2020    CALCIUM 8.2 2020    GFRAA >60 2020    LABGLOM >60 2020    GLUCOSE 130 2020    GLUCOSE 103 2012       RADIOLOGY:   Ct Head Wo Contrast  Result Date: 2020  Age-indeterminate right posterior MCA infarction. No acute intracranial hemorrhage. Critical results were called by Dr. Mitchell Geronimo MD to [facility] on 2020 at 20:33. Cta Head Neck W Contrast  Result Date: 9/20/2020  Decrease in left common carotid artery stenosis, now measuring 30% and decrease in size of free-floating intraluminal thrombus. No additional hemodynamically significant stenosis or occlusion in the cervical arterial circulation. Unremarkable CTA of the head. Cta Head Neck W Contrast  Result Date: 9/16/2020  1. Mid left common carotid artery lateral wall irregular atherosclerotic plaque versus clot which extends centrally within the lumen causing approximately 60% arterial stenosis over a segment measuring 1.5 cm. 2. Otherwise, unremarkable CT angiogram of the head and neck. 3. Redemonstration of right parietal temporal hypoattenuation and volume loss consistent with subacute to remote infarction. RECOMMENDATIONS: Recommend MRI brain with diffusion-weighted imaging for further evaluation of acute ischemia. Mri Brain Without Contrast  Result Date: 9/17/2020  Multiple small acute infarcts within the left cerebral hemisphere. The findings were sent to the Radiology Results [address] at 3:33 pm on 9/17/2020to be communicated to a licensed caregiver. Labs and Images reviewed with:    [] Jesse Smith MD    [x] La Bingham MD  [] Riaz Mosley MD  --[] there are no new interval images to review. ASSESSMENT AND PLAN:       The patient is 59 yo female with a history of HTN, HLD, breast CA and previous CVA (around 3 years ago) who presented with complaints of right hand paresthesias and ataxia. Found to have left common carotid plaque vs thrombus with ~60% stenosis. Loaded with Plavix, continued on Aspirin and started on low dose Heparin infusion.      NEUROLOGIC:  - Multiple scattered small acute left hemispheric infarct  - Left common carotid plaque vs thrombus with ~60% stenosis  - Etiology appears cardioembolic   - Repeat CTA Head/Neck 9/20 showed decrease in size of free floating thrombus and stenosis (now 30%) in the left common carotid   - Continue low dose heparin infusion  - Continue Aspirin 81mg QD, Plavix 75mg QD and high dose Lipitor 80mg QHS  - Follow up repeat CTA Head/Neck tonight  - Neuro Endovascular following  - Goal -200  - Loop recorder with no events, Hypercoag panel sent due to unclear etiology of recurrent CVAs  - Neuro checks per protocol    CARDIOVASCULAR:  - Goal -200, avoid symptomatic hypotension  - Hold home antihypertensives for now  - Troponin 6, EKG NSR  - Echo EF 34%, grade I diastolic dysfunction  - Per recent Cardiology note, previous EDGAR negative for PFO  - Loop recorder in place, interrogation showed no events  - Lipid panel; LDL 76, Cholesterol 162  - Lipitor 80mg QHS  - Continue telemetry    PULMONARY:  - Maintaining O2 sats on room air    RENAL/FLUID/ELECTROLYTE:  - Normal renal functioning  - BUN 9/ Creatinine 0.44  - Adequate urine output per nursing  - Hyponatremia possible secondary to SIAHD, resolved  - Sodium 138  - Continue on 1.2L free water restriction for now, consider stopping in next few days  - Replace electrolytes PRN  - Daily BMP    GI/NUTRITION:  NUTRITION:  DIET CARB CONTROL; Daily Fluid Restriction: 1200 ml   - Tolerating diet  - Complaining of loose stools;  Imodium PRN  - Bowel regimen: HELD  - GI prophylaxis: Not indicated    ID:  - Afebrile, Tmax 37.0C  - No leukocytosis, WBC 5.1  - COVID-19 negative  - Continue to monitor for fevers  - Daily CBC    HEME:   - H&H 12.7/38.1  - Platelets 375  - Daily CBC    ENDOCRINE:  - Continue to monitor blood glucose, goal <180  - History of Type 2 diabetes  - Hemoglobin A1C 12.2  - Hyperglycemic in afternoon, glucose up to 364 today  - Increase morning Lantus to 15u, continue 10u QHS (fasting AM glucose 130)  - Low insulin sliding scale, patient refusing High dose    OTHER:  - PT/OT/ST  - Discharge planning; likely discharge home    PROPHYLAXIS:  Stress ulcer: N/A    DVT PROPHYLAXIS:  - SCD sleeves - Thigh High   - On Heparin infusion    DISPOSITION: Admit to Neuro ICU for close neurological monitoring.         ASH Cheng - CNP  Neuro Critical Care Service   Pager [phone]  9/22/2020     8:14 AM

## 2020-09-22 NOTE — PLAN OF CARE
Problem: Falls - Risk of:  Goal: Will remain free from falls  Description: Will remain free from falls  9/22/2020 0508 by Jaimie Ely RN  Outcome: Met This Shift  Note: Pt remains free of falls and injuries this shift. Bed locked and low, call light and personal belongings within reach. Will continue to monitor.     9/21/2020 1757 by Hannah Ribeiro RN  Outcome: Met This Shift  Goal: Absence of physical injury  Description: Absence of physical injury  9/22/2020 0508 by Jaimie Ely RN  Outcome: Met This Shift  9/21/2020 1757 by Hannah Ribeiro RN  Outcome: Met This Shift     Problem: COMMUNICATION IMPAIRMENT  Goal: Ability to express needs and understand communication  9/22/2020 0508 by Jaimie Ely RN  Outcome: Met This Shift  9/21/2020 1757 by Hannah Ribeiro RN  Outcome: Met This Shift     Problem: Musculor/Skeletal Functional Status  Goal: Absence of falls  9/22/2020 0508 by Jaimie Ely RN  Outcome: Met This Shift  9/21/2020 1757 by Hannah Ribeiro RN  Outcome: Met This Shift     Problem: HEMODYNAMIC STATUS  Goal: Patient has stable vital signs and fluid balance  Outcome: Ongoing     Problem: ACTIVITY INTOLERANCE/IMPAIRED MOBILITY  Goal: Mobility/activity is maintained at optimum level for patient  9/22/2020 4243 by Jaimie Ely RN  Outcome: Ongoing  9/21/2020 1757 by Hannah Ribeiro RN  Outcome: Met This Shift     Problem: Musculor/Skeletal Functional Status  Goal: Highest potential functional level  9/22/2020 0508 by Jaimie Ely RN  Outcome: Ongoing  9/21/2020 1757 by Hannah Ribeiro RN  Outcome: Met This Shift

## 2020-09-22 NOTE — PROGRESS NOTES
Physical Therapy  Facility/Department: [facility] NEURO  Daily Treatment Note  NAME: Maryjane Song  : 1955  MRN: [id number]    Date of Service: 2020    Discharge Recommendations:  Home independently        Assessment   Body structures, Functions, Activity limitations: Decreased functional mobility ; Decreased strength;Decreased endurance;Decreased balance  Assessment: Improving gait distance, slow and steady without device. Instructed in standing HEP for home use. PT Education: Goals;Plan of Care;General Safety;Precautions;Gait Training;Functional Mobility Training;Transfer Training;Home Exercise Program;Disease Specific Education  REQUIRES PT FOLLOW UP: Yes  Activity Tolerance  Activity Tolerance: Patient Tolerated treatment well     Patient Diagnosis(es): The encounter diagnosis was Cerebrovascular accident (CVA), unspecified mechanism ([facility] [address] has a past medical history of Breast cancer ([facility] [address] Depression, HTN (hypertension), Hyperlipidemia, Ovarian cyst, bilateral, Pancreatitis, and Renal stone. has a past surgical history that includes Inguinal hernia repair; Appendectomy; lymphadenectomy (Bilateral); Tunneled venous port placement; Breast lumpectomy; and Breast surgery. Restrictions  Restrictions/Precautions  Restrictions/Precautions: Up as Tolerated, Fall Risk  Required Braces or Orthoses?: No  Position Activity Restriction  Other position/activity restrictions: 1.2 L fluid restriction  Subjective   General  Chart Reviewed: Yes  Response To Previous Treatment: Patient with no complaints from previous session.   Family / Caregiver Present: Yes  Pain Screening  Patient Currently in Pain: Denies  Vital Signs  Patient Currently in Pain: Denies       Orientation  Orientation  Overall Orientation Status: Within Functional Limits  Cognition   Cognition  Overall Cognitive Status: WFL  Objective   Bed mobility  Rolling to Left: Independent  Supine to Sit: Independent  Sit to Supine: Independent  Scooting: Independent  Transfers  Sit to Stand: Supervision  Stand to sit: Supervision  Ambulation  Ambulation?: Yes  Ambulation 1  Surface: level tile  Device: No Device  Assistance: Stand by assistance  Quality of Gait: Slow and  steady  Distance: 400'        Exercises  Comments: Standing LE exercises inc hip flexion marches, heel/toe raises, hip abd swings, hamstring curls, hip extension swings x10 reps all. Goals  Short term goals  Time Frame for Short term goals: 6 visits  Short term goal 1: Pt to perform bed mobility and functional transfers with independence  Short term goal 2: Pt to ambulate 240 ft SBA and no AD to promote independence. STG 3 met  Short term goal 3: Pt to navigate full flight of stairs (~15) w/o HR and SBA to simulate home environment  Short term goal 4: Pt to tolerate 30 min of PT to demo improved endurance/ decreased fatigue. Progress seen here. Short term goal 5: Pt to demo good- standing dynamic balance to reduce risk of falls    Plan    Plan  Times per week: 5x/wk  Times per day: Daily  Specific instructions for Next Treatment: If endurance is sufficient to ensure safety, try stairs  Current Treatment Recommendations: Strengthening, Balance Training, Functional Mobility Training, Endurance Training, Home Exercise Program, Safety Education & Training, Patient/Caregiver Education & Training, Gait Training, Stair training, Transfer Training  Safety Devices  Type of devices:  All fall risk precautions in place, Call light within reach, Left in bed, Gait belt  Restraints  Initially in place: No     Therapy Time   Individual Concurrent Group Co-treatment   Time In 1445         Time Out 1510         Minutes 25         Timed Code Treatment Minutes: Mingo 1690, PT

## 2020-09-22 NOTE — ADT AUTH CERT
Utilization Reviews         Stroke: Ischemic - Care Day 7 (9/22/2020) by Dawson Rasheed RN         Review Status  Review Entered    Completed  9/22/2020 16:04        Criteria Review       Care Day: 7 Care Date: 9/22/2020 Level of Care:    Guideline Day 3    Level Of Care    ( ) Stroke unit or floor to discharge    9/22/2020 4:04 PM EDT by Bobbye Moritz      PCU on tele    Clinical Status    (X) * Hemodynamic stability    9/22/2020 4:04 PM EDT by Bobbye Moritz      143/75Abnormal   HR 80    (X) * Dangerous arrhythmia absent    9/22/2020 4:04 PM EDT by Bobbye Moritz      Absent    (X) * Mental status at baseline or stable and appropriate for next level of care    9/22/2020 4:04 PM EDT by Anisha Bruno, [address] [place] [address] And Main and oriented x 3    (X) * Neurologic deficits absent or stable and appropriate for next level of care    9/22/2020 4:04 PM EDT by Lxeus Brown Right upper extremity/hand strength improved, patient feels it is almost back to normal but mild sensation deficit persists    (X) * Adequate dietary intake    9/22/2020 4:04 PM EDT by Anisha Bruno [address] [place] [address]; Daily Fluid Restriction: 1200 ml   - Tolerating diet    ( ) * Discharge plans and education understood    Activity    (X) * Up to chair    9/22/2020 4:04 PM EDT by Bobbye Moritz      Body structures, Functions, Activity limitations: Decreased functional mobility ; Decreased strength;Decreased endurance;Decreased balance  Assessment: Improving gait distance, slow and steady without device    (X) * Assisted ambulation as tolerated    9/22/2020 4:04 PM EDT by Bobbye Moritz      Body structures, Functions, Activity limitations: Decreased functional mobility ; Decreased strength;Decreased endurance;Decreased balance  Assessment: Improving gait distance, slow and steady without device    Routes    (X) * Oral hydration, medications, and diet    9/22/2020 4:04 PM EDT by [employer] [facility] [place] [address]; Daily Fluid Restriction: 1200 ml    Interventions    (X) * Supplemental oxygen absent or at baseline requirement    9/22/2020 4:04 PM EDT by Rey Reynoso      98% RA    Medications    ( ) * Antithrombotic therapy appropriate for next level of care established [I]    9/22/2020 4:04 PM EDT by Lynn Bernheim, [place]      Heparin 12 units/kg/hr IV cont    * Milestone    Additional Notes    9/22/20 Neuro on tele       /81   Pulse 77   Temp 97.3 °F (36.3 °C) (Oral)   Resp 13   Ht 4' 11\" (1.499 m)   Wt 119 lb 11.4 oz (54.3 kg)   SpO2 98% RA       PHYSICAL EXAM:    CONSTITUTIONAL: Well developed, well nourished. Keary Hurry and oriented x 3, in no acute distress. GCS 15. Nontoxic. No dysarthria. No aphasia. HEAD: normocephalic, atraumatic    EYES: PERRL, EOMI.    ENT: moist mucous membranes    LUNGS: Equal air entry bilaterally, clear    CARDIOVASCULAR: normal s1 / s2, RRR    ABDOMEN: Soft, no rigidity    NECK supple, symmetric    EXTREMITIES Normal ROM with no deformities    NEUROLOGIC: Mental Status:  A & O x3, Awake    Cranial Nerves:      cranial nerves II-XII are grossly intact    Motor Exam:      Drift:  absent    Tone:  normal    Motor exam is 5 out of 5 all extremities. Sensory:      Touch:      Right Upper Extremity:  abnormal - numbness/tingling to right hand    Left Upper Extremity:  normal    Right Lower Extremity:  normal    Left Lower Extremity:  normal    Coordination/Dysmetria:    Heel to Shin:    Right:  normal    Left:  normal    Finger to Nose:    Right:  normal    Left:  normal      SKIN No obvious ecchymosis, rashes, or lesions       Last 24h:    No acute events overnight.  Right upper extremity/hand strength improved, patient feels it is almost back to normal but mild sensation deficit persists.  NIH 1.  Remains on dual antiplatelet therapy and Heparin infusion (PTT 71.7).   Plan to repeat CTA Head/Neck again tomorrow.  Patient with loose stools, requesting PRN Imodium       Calcium, Ion  1.05Low      CREATININE  0.44Low  mg/dL      Calcium  8.2       Neuro critical Care Plan    The patient is 61 yo female with a history of HTN, HLD, breast CA and previous CVA (around 3 years ago) who presented with complaints of right hand paresthesias and ataxia.  Found to have left common carotid plaque vs thrombus with ~60% stenosis.  Loaded with Plavix, continued on Aspirin and started on low dose Heparin infusion.     NEUROLOGIC:    - Multiple scattered small acute left hemispheric infarct    - Left common carotid plaque vs thrombus with ~60% stenosis    - Etiology appears cardioembolic    - Repeat CTA Head/Neck 9/20 showed decrease in size of free floating thrombus and stenosis (now 30%) in the left common carotid    - Continue low dose heparin infusion    - Continue Aspirin 81mg QD, Plavix 75mg QD and high dose Lipitor 80mg QHS    - Neuro Endovascular following; plan for repeat CTA Head/Neck 9/22    - Goal -200    - Loop recorder with no events, Hypercoag panel sent due to unclear etiology of recurrent CVAs    - Neuro checks per protocol    CARDIOVASCULAR:    - Goal -200, avoid symptomatic hypotension    - Hold home antihypertensives for now    - Troponin 6, EKG NSR    - Echo EF 01%, grade I diastolic dysfunction    - Per recent Cardiology note, previous EDGAR negative for PFO    - Loop recorder in place, interrogation showed no events    - Lipid panel; LDL 76, Cholesterol 162    - Lipitor 80mg QHS    - Continue telemetry    PULMONARY:    - Maintaining O2 sats on room air    RENAL/FLUID/ELECTROLYTE:    - Normal renal functioning    - BUN 7/ Creatinine 0.44    - Adequate urine output per nursing    - Hyponatremia possible secondary to SIAHD, resolved    - Sodium 135    - Continue on 1.2L free water restriction for now    - Replace electrolytes PRN    - Daily BMP    GI/NUTRITION:    NUTRITION:    DIET CARB CONTROL; Daily Fluid Restriction: 1200 ml    - Tolerating diet    - Complaining of loose stools; Imodium PRN    - Bowel regimen: HELD    - GI prophylaxis: Not indicated    ID    - Afebrile, Tmax 37.5C    - No leukocytosis, WBC 5.8    - COVID-19 negative    - Continue to monitor for fevers    - Daily CBC    HEME:    - H&H 12.4/39.1    - Platelets 968    - Daily CBC    ENDOCRINE:    - Continue to monitor blood glucose, goal <180    - History of Type 2 diabetes    - Hemoglobin A1C 12.2    - Continued hyperglycemia    - Continue Lantus 10u BID, consider increasing based on glucose trend    - Low insulin sliding scale, patient refusing High dose    OTHER:    - PT/OT/ST    - Discharge planning; likely home with possible outpatient OT    PROPHYLAXIS:    Stress ulcer: N/A    DVT PROPHYLAXIS:    - SCD sleeves - Thigh High    - On Heparin infusion         MEDS    ASA 81 mg po QD    Lipitor 80 mg po HS    Celexa 20 mg po QD    Plavix 75 mg po QD    Folic acid 1 mg po QD    Lantus 10 units SC BID    Humalog SS SC TID HS    Heparin 12 units/kg/hr IV cont

## 2020-09-23 VITALS
OXYGEN SATURATION: 96 % | BODY MASS INDEX: 23.5 KG/M2 | HEART RATE: 78 BPM | HEIGHT: 60 IN | WEIGHT: 119.71 LBS | SYSTOLIC BLOOD PRESSURE: 146 MMHG | TEMPERATURE: 97.7 F | DIASTOLIC BLOOD PRESSURE: 80 MMHG | RESPIRATION RATE: 12 BRPM

## 2020-09-23 LAB
-: NORMAL
ABSOLUTE EOS #: 0.1 K/UL (ref 0–0.44)
ABSOLUTE IMMATURE GRANULOCYTE: <0.03 K/UL (ref 0–0.3)
ABSOLUTE LYMPH #: 1.68 K/UL (ref 1.1–3.7)
ABSOLUTE MONO #: 0.48 K/UL (ref 0.1–1.2)
ANION GAP SERPL CALCULATED.3IONS-SCNC: 14 MMOL/L (ref 9–17)
BASOPHILS # BLD: 1 % (ref 0–2)
BASOPHILS ABSOLUTE: 0.03 K/UL (ref 0–0.2)
BUN BLDV-MCNC: 8 MG/DL (ref 8–23)
BUN/CREAT BLD: ABNORMAL (ref 9–20)
CALCIUM SERPL-MCNC: 8.7 MG/DL (ref 8.6–10.4)
CHLORIDE BLD-SCNC: 104 MMOL/L (ref 98–107)
CO2: 20 MMOL/L (ref 20–31)
CREAT SERPL-MCNC: 0.54 MG/DL (ref 0.5–0.9)
DIFFERENTIAL TYPE: NORMAL
EOSINOPHILS RELATIVE PERCENT: 2 % (ref 1–4)
GFR AFRICAN AMERICAN: >60 ML/MIN
GFR NON-AFRICAN AMERICAN: >60 ML/MIN
GFR SERPL CREATININE-BSD FRML MDRD: ABNORMAL ML/MIN/{1.73_M2}
GFR SERPL CREATININE-BSD FRML MDRD: ABNORMAL ML/MIN/{1.73_M2}
GLUCOSE BLD-MCNC: 183 MG/DL (ref 65–105)
GLUCOSE BLD-MCNC: 305 MG/DL (ref 70–99)
HCT VFR BLD CALC: 38.4 % (ref 36.3–47.1)
HEMOGLOBIN: 12.8 G/DL (ref 11.9–15.1)
IMMATURE GRANULOCYTES: 0 %
LYMPHOCYTES # BLD: 33 % (ref 24–43)
MCH RBC QN AUTO: 31.4 PG (ref 25.2–33.5)
MCHC RBC AUTO-ENTMCNC: 33.3 G/DL (ref 28.4–34.8)
MCV RBC AUTO: 94.1 FL (ref 82.6–102.9)
MONOCYTES # BLD: 9 % (ref 3–12)
NRBC AUTOMATED: 0 PER 100 WBC
PARTIAL THROMBOPLASTIN TIME: 44.5 SEC (ref 20.5–30.5)
PARTIAL THROMBOPLASTIN TIME: >120 SEC (ref 20.5–30.5)
PDW BLD-RTO: 12.2 % (ref 11.8–14.4)
PLATELET # BLD: 311 K/UL (ref 138–453)
PLATELET ESTIMATE: NORMAL
PMV BLD AUTO: 10 FL (ref 8.1–13.5)
POTASSIUM SERPL-SCNC: 4.7 MMOL/L (ref 3.7–5.3)
RBC # BLD: 4.08 M/UL (ref 3.95–5.11)
RBC # BLD: NORMAL 10*6/UL
REASON FOR REJECTION: NORMAL
SEG NEUTROPHILS: 55 % (ref 36–65)
SEGMENTED NEUTROPHILS ABSOLUTE COUNT: 2.79 K/UL (ref 1.5–8.1)
SODIUM BLD-SCNC: 138 MMOL/L (ref 135–144)
WBC # BLD: 5.1 K/UL (ref 3.5–11.3)
WBC # BLD: NORMAL 10*3/UL
ZZ NTE CLEAN UP: ORDERED TEST: NORMAL
ZZ NTE WITH NAME CLEAN UP: SPECIMEN SOURCE: NORMAL

## 2020-09-23 PROCEDURE — 82947 ASSAY GLUCOSE BLOOD QUANT: CPT

## 2020-09-23 PROCEDURE — 6370000000 HC RX 637 (ALT 250 FOR IP): Performed by: STUDENT IN AN ORGANIZED HEALTH CARE EDUCATION/TRAINING PROGRAM

## 2020-09-23 PROCEDURE — 99233 SBSQ HOSP IP/OBS HIGH 50: CPT | Performed by: PSYCHIATRY & NEUROLOGY

## 2020-09-23 PROCEDURE — 36415 COLL VENOUS BLD VENIPUNCTURE: CPT

## 2020-09-23 PROCEDURE — 80048 BASIC METABOLIC PNL TOTAL CA: CPT

## 2020-09-23 PROCEDURE — 6370000000 HC RX 637 (ALT 250 FOR IP): Performed by: NURSE PRACTITIONER

## 2020-09-23 PROCEDURE — 85730 THROMBOPLASTIN TIME PARTIAL: CPT

## 2020-09-23 PROCEDURE — 85025 COMPLETE CBC W/AUTO DIFF WBC: CPT

## 2020-09-23 RX ORDER — ATORVASTATIN CALCIUM 80 MG/1
80 TABLET, FILM COATED ORAL NIGHTLY
Qty: 30 TABLET | Refills: 3 | Status: SHIPPED | OUTPATIENT
Start: 2020-09-23 | End: 2020-10-23 | Stop reason: SDUPTHER

## 2020-09-23 RX ADMIN — Medication 81 MG: at 09:07

## 2020-09-23 RX ADMIN — BUTALBITAL, ACETAMINOPHEN AND CAFFEINE 1 TABLET: 50; 325; 40 TABLET ORAL at 12:15

## 2020-09-23 RX ADMIN — INSULIN LISPRO 4 UNITS: 100 INJECTION, SOLUTION INTRAVENOUS; SUBCUTANEOUS at 12:10

## 2020-09-23 RX ADMIN — CLOPIDOGREL 75 MG: 75 TABLET, FILM COATED ORAL at 09:07

## 2020-09-23 RX ADMIN — FOLIC ACID 1 MG: 1 TABLET ORAL at 09:07

## 2020-09-23 RX ADMIN — INSULIN GLARGINE 15 UNITS: 100 INJECTION, SOLUTION SUBCUTANEOUS at 09:08

## 2020-09-23 RX ADMIN — CITALOPRAM HYDROBROMIDE 20 MG: 20 TABLET ORAL at 09:07

## 2020-09-23 ASSESSMENT — PAIN SCALES - GENERAL
PAINLEVEL_OUTOF10: 0
PAINLEVEL_OUTOF10: 5
PAINLEVEL_OUTOF10: 4
PAINLEVEL_OUTOF10: 0

## 2020-09-23 NOTE — PLAN OF CARE
Nutrition Problem #1: Inadequate oral intake  Intervention: Food and/or Nutrient Delivery: Continue Current Diet  Nutritional Goals: PO intake to meet greater than 50% of estimated nutrient needs.

## 2020-09-23 NOTE — PROGRESS NOTES
Monitoring    Goals:  PO intake to meet greater than 50% of estimated nutrient needs.        Nutrition Monitoring and Evaluation:   Behavioral-Environmental Outcomes:  (N/A)   Food/Nutrient Intake Outcomes:  Food and Nutrient Intake  Physical Signs/Symptoms Outcomes:  Weight, Biochemical Data     Discharge Planning:    No discharge needs at this time     Electronically signed by Dani Perkins MS, RD, LD on 9/23/20 at 3:53 PM EDT    Contact: [phone]

## 2020-09-23 NOTE — DISCHARGE SUMMARY
Neuro Critical Care   Discharge Summary      PATIENT NAME: Tri Rios  YOB: 1955  MEDICAL RECORD NO. [id number]  DATE: 9/23/2020  PRIMARY CARE PHYSICIAN: ASH Raman CNP   Admission Date: 9/16/20  DISCHARGE DATE:  9/23/20  DISCHARGE DIAGNOSIS:   Active Problems:    Acute ischemic stroke [facility])  Resolved Problems:    Cerebrovascular accident (CVA) [facility])        Lenin Benitez is a 60 y.o. yo female with history of HTN, HLD, breast cancer and previous cryptogenic CVA (around 3 years ago) who presented to [facility] on 9/16/2020  8:09 PM with right hand paresthesias and ataxia. LKW around 1900. Per records, patient's  noticed patient was not acting like herself and had right sided weakness. Patient reports she was gardening and noticed her right hand wasn't working and felt like it had \"electricity\" running through it. She states she went inside and asked her daughter to call 911 as she had similar symptoms during her last CVA, but on her left side. On arrival to the ED, NIH 1 for sensory. CT Head with no acute abnormality. TPA not administered due to low NIH and no disabling symptoms. CTA Head/Neck showed left common carotid artery lateral wall irregularity, plaque vs clot, causing ~60@ stenosis. Patient was loaded with 300mg Plavix, continued on Aspirin and started on low dose Heparin infusion. Loop recorder that was placed previously was interrogated and negative for any events. MRI brain showed scattered infarcts within the left cerebral hemisphere. Repeat CTA head/neck showed improved left CCA stenosis, 25% with resolved intraluminal thrombus. Endovascular recommended Xarelto 20 mg QD and aspirin 81 mg QD x 3 months for possible underlying hypercoagulable state with multiple occurrences of stroke. Will follow up with repeat CTA head/neck in 3 months and determine need to continue oral AC, will otherwise transition to DAPT at that time.  Patient to follow up with PCP in next week for new onset diabetes management. PT/OT evaluated patient and recommend discharge home independently. Patient updated on plan and proper follow up. Labs and imaging were followed daily. At time of discharge, Eddie Trinidad was tolerating a DIET CARB CONTROL; Daily Fluid Restriction: 1200 ml  Dietary Nutrition Supplements: Diabetic Oral Supplement, having bowel movements, and is in stable condition to be discharged to home. PROCEDURES:    NA    PHYSICAL EXAMINATION        Discharge Vitals:  height is 5' (1.524 m) and weight is 119 lb 11.4 oz (54.3 kg). Her oral temperature is 97.7 °F (36.5 °C). Her blood pressure is 146/80 (abnormal) and her pulse is 78. Her respiration is 12 and oxygen saturation is 96%. CONSTITUTIONAL:  Well developed, well nourished, alert and oriented x 3, in no acute distress. GCS 15. Nontoxic. No dysarthria. No aphasia. HEAD:  normocephalic, atraumatic    EYES:  PERRLA, EOMI.   ENT:  moist mucous membranes   NECK:  supple, symmetric   LUNGS:  Equal air entry bilaterally   CARDIOVASCULAR:  normal s1 / s2, RRR, distal pulses intact   ABDOMEN:  Soft, no rigidity   NEUROLOGIC:  Mental Status:  A & O x3,awake             Cranial Nerves:    cranial nerves II-XII are grossly intact    Motor Exam:    Drift:  absent  Tone:  normal    Motor exam is symmetrical 5 out of 5 all extremities bilaterally    Sensory:    Touch:    Right Upper Extremity:  abnormal - numbness/tingling right hand  Left Upper Extremity:  normal  Right Lower Extremity:  normal  Left Lower Extremity:  normal    Coordination/Dysmetria:  Heel to Shin:  Right:  normal  Left:  normal  Finger to Nose:   Right:  normal  Left:  normal     Gait:  normal   NIH Stroke Scale Total (if not done complete detailed one below):    1a.  Level of consciousness:  0 - alert; keenly responsive  1b. Level of consciousness questions:  0 - answers both questions correctly  1c.   Level of consciousness questions:  0 - performs both tasks correctly  2. Best Gaze:  0 - normal  3. Visual:  0 - no visual loss  4. Facial Palsy:  0 - normal symmetric movement  5a. Motor left arm:  0 - no drift, limb holds 90 (or 45) degrees for full 10 seconds  5b. Motor right arm:  0 - no drift, limb holds 90 (or 45) degrees for full 10 seconds  6a. Motor left le - no drift; leg holds 30 degree position for full 5 seconds  6b. Motor right le - no drift; leg holds 30 degree position for full 5 seconds  7. Limb Ataxia:  0 - absent  8. Sensory:  1 - mild to moderate sensory loss; patient feels pinprick is less sharp or is dull on the affected side; there is a loss of superficial pain with pinprick but patient is aware of being touched   9. Best Language:  0 - no aphasia, normal  10. Dysarthria:  0 - normal  11. Extinction and Inattention:  0 - no abnormality  TOTAL:       LABS/IMAGING     Recent Labs     20  0620  0516 20  0616 20  1107   WBC 5.8 5.1 5.1  --    HGB 12.4 12.7 12.8  --    HCT 39.4 38.4 38.4  --     272 311  --     138  --  138   K 3.9 4.1  --  4.7    107  --  104   CO2 20 20  --  20   BUN 7* 9  --  8   CREATININE 0.44* 0.44*  --  0.54       Ct Head Wo Contrast    Result Date: 2020  EXAMINATION: CT OF THE HEAD WITHOUT CONTRAST  2020 7:47 pm TECHNIQUE: CT of the head was performed without the administration of intravenous contrast. Dose modulation, iterative reconstruction, and/or weight based adjustment of the mA/kV was utilized to reduce the radiation dose to as low as reasonably achievable. COMPARISON: 2018 HISTORY: ORDERING SYSTEM PROVIDED HISTORY: Stroke TECHNOLOGIST PROVIDED HISTORY: Stroke FINDINGS: Examination is degraded by motion. BRAIN/VENTRICLES: There is no acute intracranial hemorrhage, mass effect or midline shift. No abnormal extra-axial fluid collection. There is no evidence of hydrocephalus.  There is hypoattenuation and loss of gray-white differentiation within the right parieto-occipital lobe. This is in the area of previously seen infarction, but is larger/more extensive. ORBITS: The visualized portion of the orbits demonstrate no acute abnormality. SINUSES: The visualized paranasal sinuses and mastoid air cells demonstrate no acute abnormality. SOFT TISSUES/SKULL:  No acute abnormality of the visualized skull or soft tissues. Age-indeterminate right posterior MCA infarction. No acute intracranial hemorrhage. Critical results were called by Dr. Miles Berger MD to [facility] on 9/16/2020 at 20:33. Xr Chest Portable    Result Date: 9/16/2020  EXAMINATION: ONE XRAY VIEW OF THE CHEST 9/16/2020 9:33 pm COMPARISON: 04/25/2018 HISTORY: ORDERING SYSTEM PROVIDED HISTORY: CVA TECHNOLOGIST PROVIDED HISTORY: CVA Reason for Exam: portable upright/ stroke alert Acuity: Acute Type of Exam: Initial FINDINGS: Cardiomediastinal silhouette is not significantly changed in size. Loop recorder is noted. No pulmonary consolidation, pleural effusion, or pneumothorax. No acute osseous abnormality. Suspected hiatal hernia. Surgical clips in the left axilla. No acute cardiopulmonary abnormality. Cta Head Neck W Contrast    Result Date: 9/23/2020  EXAMINATION: CTA OF THE HEAD AND NECK WITH CONTRAST 9/22/2020 11:06 pm: TECHNIQUE: CTA of the head and neck was performed with the administration of intravenous contrast. Multiplanar reformatted images are provided for review. MIP images are provided for review. Stenosis of the internal carotid arteries measured using NASCET criteria. Dose modulation, iterative reconstruction, and/or weight based adjustment of the mA/kV was utilized to reduce the radiation dose to as low as reasonably achievable. COMPARISON: 09/20/2020 HISTORY: ORDERING SYSTEM PROVIDED HISTORY: on heparin. known left ICA thrombus on previous CTA TECHNOLOGIST PROVIDED HISTORY: on heparin.  known left ICA Resulting stenosis at this location is only about 25%. 50% stenosis right CCA. Otherwise patent cervical and intracranial vasculature. Cta Head Neck W Contrast    Result Date: 9/20/2020  EXAMINATION: CTA OF THE HEAD AND NECK WITH CONTRAST, 9/20/2020 12:14 am: TECHNIQUE: CTA of the head and neck was performed with the administration of intravenous contrast. Multiplanar reformatted images are provided for review. MIP images are provided for review. Stenosis of the internal carotid arteries measured using NASCET criteria. Dose modulation, iterative reconstruction, and/or weight based adjustment of the mA/kV was utilized to reduce the radiation dose to as low as reasonably achievable. 3D surface reformatted and curved MIP images were submitted for review. COMPARISON: 09/16/2020 HISTORY: ORDERING SYSTEM PROVIDED HISTORY: Follow up left common carotid thrombus vs plaque s/p 72h Heparin and dual antiplatelet. TECHNOLOGIST PROVIDED HISTORY: Follow up left common carotid thrombus vs plaque s/p 72h Heparin and dual antiplatelet. Reason for Exam: Follow up Acuity: Unknown Type of Exam: Unknown FINDINGS: CTA NECK: AORTIC ARCH/ARCH VESSELS: No dissection or arterial injury. No significant stenosis of the brachiocephalic artery. Moderate right and severe left subclavian artery stenosis due to noncalcified plaque. CAROTID ARTERIES: Right side: Noncalcified atherosclerotic plaque in the mid right common carotid artery results in about 50% luminal stenosis. Right internal carotid artery is patent without focal stenosis, dissection, or aneurysm. Left side: Left common carotid artery is patent. Again seen is eccentric atherosclerotic plaque in the mid segment of the common carotid artery with associated free-floating intraluminal thrombus, decreased in size compared to prior study. There is about 30% stenosis of the left common carotid artery.  The internal thrombus previously measured about 9 x 4 mm in the coronal plane, now about 5.6 x 1.5 mm. The left cervical internal carotid artery is patent without focal stenosis, dissection, or injury. VERTEBRAL ARTERIES: No dissection, arterial injury, or significant stenosis. SOFT TISSUES: The lung apices are clear. No cervical or superior mediastinal lymphadenopathy. The larynx and pharynx are unremarkable. No acute abnormality of the salivary and thyroid glands. BONES: No acute osseous abnormality. CTA HEAD: ANTERIOR CIRCULATION: No significant stenosis of the intracranial internal carotid, anterior cerebral, or middle cerebral arteries. No aneurysm. Anterior communicating artery is present. Hypoplastic A1 segment of the right anterior cerebral artery. POSTERIOR CIRCULATION: No significant stenosis of the vertebral, basilar, or posterior cerebral arteries. No aneurysm. Posterior communicating arteries are present. OTHER: No dural venous sinus thrombosis on this non-dedicated study. Decrease in left common carotid artery stenosis, now measuring 30% and decrease in size of free-floating intraluminal thrombus. No additional hemodynamically significant stenosis or occlusion in the cervical arterial circulation. Unremarkable CTA of the head. Cta Head Neck W Contrast    Result Date: 9/16/2020  EXAMINATION: CTA OF THE HEAD AND NECK WITH CONTRAST 9/16/2020 8:26 pm: TECHNIQUE: CTA of the head and neck was performed with the administration of intravenous contrast. Multiplanar reformatted images are provided for review. MIP images are provided for review. Stenosis of the internal carotid arteries measured using NASCET criteria. Dose modulation, iterative reconstruction, and/or weight based adjustment of the mA/kV was utilized to reduce the radiation dose to as low as reasonably achievable. COMPARISON: None.  HISTORY: ORDERING SYSTEM PROVIDED HISTORY: CVA TECHNOLOGIST PROVIDED HISTORY: CVA Reason for Exam: CVA Acuity: Acute Type of Exam: Initial FINDINGS: CTA NECK: AORTIC ARCH/ARCH VESSELS: No dissection or arterial injury. No significant stenosis of the brachiocephalic or subclavian arteries. CAROTID ARTERIES: Mid left common carotid artery lateral wall atherosclerotic plaque versus clot is noted which extends into the lumen causing approximately 60% arterial stenosis. Bilateral carotid bifurcation internal and external carotid arteries are patent and otherwise unremarkable in appearance. No evidence of carotid dissection is identified. VERTEBRAL ARTERIES: No dissection, arterial injury, or significant stenosis. SOFT TISSUES: The lung apices are clear. No cervical or superior mediastinal lymphadenopathy. The larynx and pharynx are unremarkable. No acute abnormality of the salivary and thyroid glands. BONES: No acute osseous abnormality. CTA HEAD: ANTERIOR CIRCULATION: No significant stenosis of the intracranial internal carotid, anterior cerebral, or middle cerebral arteries. No aneurysm. POSTERIOR CIRCULATION: No significant stenosis of the vertebral, basilar, or posterior cerebral arteries. No aneurysm. OTHER: No dural venous sinus thrombosis on this non-dedicated study. BRAIN: No mass effect or midline shift. No extra-axial fluid collection. The gray-white differentiation is maintained. Redemonstration of hypoattenuation within the right parietal temporal lobe is noted. 1. Mid left common carotid artery lateral wall irregular atherosclerotic plaque versus clot which extends centrally within the lumen causing approximately 60% arterial stenosis over a segment measuring 1.5 cm. 2. Otherwise, unremarkable CT angiogram of the head and neck. 3. Redemonstration of right parietal temporal hypoattenuation and volume loss consistent with subacute to remote infarction. RECOMMENDATIONS: Recommend MRI brain with diffusion-weighted imaging for further evaluation of acute ischemia.      Mri Brain Without Contrast    Result Date: 9/17/2020  EXAMINATION: MRI OF THE BRAIN WITHOUT CONTRAST  9/17/2020 2:56 pm TECHNIQUE: Multiplanar multisequence MRI of the brain was performed without the administration of intravenous contrast. COMPARISON: None. HISTORY: ORDERING SYSTEM PROVIDED HISTORY: stroke like symptoms TECHNOLOGIST PROVIDED HISTORY: stroke like symptoms Reason for Exam: stroke like symptoms. FINDINGS: INTRACRANIAL STRUCTURES/VENTRICLES: There are small acute cortical and subcortical infarcts within the left frontal, left parietal, and left occipital lobes as well as an acute lacunar infarct within the left mid corona radiata. Volume loss is noted with chronic white matter microvascular ischemic change. A remote infarct is noted within the right parietal lobe, with associated encephalomalacia. Normal expected signal voids are present within the vessels at the base of skull. No mass, shift, or bleed is identified. ORBITS: The visualized portion of the orbits demonstrate no acute abnormality. SINUSES: The visualized paranasal sinuses and mastoid air cells are well aerated. BONES/SOFT TISSUES: The bone marrow signal intensity appears normal. The soft tissues demonstrate no acute abnormality. Multiple small acute infarcts within the left cerebral hemisphere. The findings were sent to the Radiology Results [id number] at 3:33 pm on 9/17/2020to be communicated to a licensed caregiver. DISCHARGE INSTRUCTIONS     Discharge Medications:        Medication List      START taking these medications    atorvastatin 80 MG tablet  Commonly known as:  LIPITOR  Take 1 tablet by mouth nightly     rivaroxaban 20 MG Tabs tablet  Commonly known as:  XARELTO  Take 1 tablet by mouth daily        CONTINUE taking these medications    aspirin 81 MG tablet     Blood Pressure Kit  Check blood pressure daily.      butalbital-APAP-caffeine -40 MG Caps per capsule  Commonly known as:  Fioricet  Take 1 capsule by mouth every 4 hours as needed for Headaches     citalopram 20 MG tablet  Commonly known as:  CeleXA  Take 1 tablet by mouth daily. lisinopril-hydroCHLOROthiazide 20-12.5 MG per tablet  Commonly known as:  PRINZIDE;ZESTORETIC     metoprolol tartrate 50 MG tablet  Commonly known as:  Lopressor  Take 1 tablet by mouth daily. multivitamin tablet  Take 1 tablet by mouth daily. Where to Get Your Medications      These medications were sent to [facility], [address] [place] [address]  [address], [place] [address]    Phone:  [phone]   · atorvastatin 80 MG tablet  · rivaroxaban 20 MG Tabs tablet       Diet: DIET CARB CONTROL; Daily Fluid Restriction: 1200 ml  Dietary Nutrition Supplements: Diabetic Oral Supplement diet as tolerated  Activity: as tolerated  Follow-up:  PCP in 1 week;  Endovascular in 2 weeks and 3 months with preclinic CTA head/neck; Cardiology prn - consider endocrinology consult  Time Spent for discharge: > 30 minutes    ASH Larios - CNP  Neuro Critical Care  9/23/2020, 5:22 PM

## 2020-09-23 NOTE — PROGRESS NOTES
Endovascular Neurosurgery Progress Note    SUBJECTIVE:   Patient denies any continued weakness on L wrist or paresthesia on L side of face. Diarrhea resolved. Normal BM this morning. Reported ambulating on own with no difficulties. Review of Systems:  CONSTITUTIONAL:  negative for fevers, chills, fatigue and malaise    EYES:  negative for double vision, blurred vision and photophobia     HEENT:  negative for tinnitus, epistaxis and sore throat    RESPIRATORY:  negative for cough, shortness of breath, wheezing    CARDIOVASCULAR:  negative for chest pain, palpitations, syncope, edema    GASTROINTESTINAL:  negative for nausea, vomiting    GENITOURINARY:  negative for incontinence    MUSCULOSKELETAL:  negative for neck or back pain    NEUROLOGICAL:  Negative for weakness and tingling  negative for headaches and dizziness    PSYCHIATRIC:  negative for anxiety      Review of systems otherwise negative. OBJECTIVE:     Vitals:    09/23/20 0430   BP: (!) 147/79   Pulse:    Resp:    Temp: 98 °F (36.7 °C)   SpO2:         General:  Gen: normal habitus, NAD  HEENT: NCAT, mucosa moist  Cvs: RRR, S1 S2 normal  Resp: symmetric unlabored breathing  Abd: s/nd/nt  Ext: no edema  Skin: no lesions seen, warm and dry    Neuro:  Gen: awake and alert, oriented x3. Lang/speech: no aphasia or dysarthria. Follows commands. CN: PERRL, EOMI, VFF, V1-3 intact, face symmetric, hearing intact, shoulder shrug symmetric, tongue midline  Motor: grossly 5/5 UE and LE b/l  Sense: LT intact in all 4 ext. Coord: FTN and HTS intact b/l  DTR: deferred  Gait: deferred    NIH Stroke Scale:   1a  Level of consciousness: 0 - alert; keenly responsive   1b. LOC questions:  0 - answers both questions correctly   1c. LOC commands: 0 - performs both tasks correctly   2. Best Gaze: 0 - normal   3. Visual: 0 - no visual loss   4. Facial Palsy: 0 - normal symmetric movement   5a.  Motor left arm: 0 - no drift, limb holds 90 (or 45) degrees for full 10 face     LDL at 76, HDL at 69, total cholesterol at 162.     Transthoracic Echocardiography Report (TTE) 9/17/2020  Global left ventricular systolic function is normal.Estimated ejection   fraction is 50%. Mild inferior wall hypokinesis. Grade I (mild) left ventricular diastolic dysfunction. Normal right ventricular size and function. Calcified mitral valve leaflets with moderate regurgitation. Estimated right ventricular systolic pressure is 07HJIA. No pericardial effusion seen.      Repeat CTA neck September 20 showed improvement in left common carotid free-floating thrombus. Repeat CTA neck September 22 showed resolution of thrombus but a soft plaque in its place with reduction of stenosis. PLAN:     1. Close monitoring for neurological status. 2. Continue heparin drip. 3. Continue dual antiplatelet therapy with aspirin Plavix. 4. Statin therapy. 5. IV hydration. Case discussed with Dr. Ange Singh attending. Tr Lazar, OMS-4    Stroke, [facility]  [id number]  Electronically signed 9/23/2020 at 6:51 AM     Addendum:     Agree with above. Pt with acute L cerebral strokes, L CCA thrombus. Exam nonfocal. L CCA thrombus improving with medical therapy. Pt will need to be switched to oral AC eventually (possibility of hypercoagulability given the now resolved floating thrombus). Plan as below:  --continue asa 81  --stop plavix   --stop hep gtt, start xarelto 20. Plan for xarelto and asa for 3mo, then switch to asa 81and plavix 75 at that time. --CTA head and neck outpt. No additional imaging needed at this time. --no intervention or DSA at this time. --f/u dr Mario Atkins endovascular clinic 2w after d/c from hospital. F/u dr. Emilie Rocha after d/c. CTA head and neck in 2w, prior to clinic visit.  Ben Bae fellow  Case discussed with Dr. Monet Thayer attending.

## 2020-09-24 LAB
FACTOR V LEIDEN MUTATION: NORMAL
MTHFR MUTATION 677T/A1298C: NORMAL
PROTHROMBIN G20210A MUTATION: NORMAL

## 2020-09-24 NOTE — CARE COORDINATION
Stroke Follow Up Phone Call    Called phone number on record - No answer.      [x]Attempt #1    []Attempt #2 (final attempt)      Electronically signed by Haim Hagen RN on 9/24/20 at 11:46 AM EDT

## 2020-09-25 NOTE — CARE COORDINATION
.Stroke Follow Up Phone Call    Called phone number on record - No answer. []Attempt #1    [x]Attempt #2 (final attempt) recording phone number no longer in service.      Electronically signed by Cyrus Kingston RN on 9/25/20 at 3:18 PM EDT

## 2020-10-16 ENCOUNTER — OFFICE VISIT (OUTPATIENT)
Dept: NEUROLOGY | Age: 65
End: 2020-10-16
Payer: COMMERCIAL

## 2020-10-16 VITALS
SYSTOLIC BLOOD PRESSURE: 122 MMHG | BODY MASS INDEX: 21.68 KG/M2 | WEIGHT: 111 LBS | HEART RATE: 105 BPM | DIASTOLIC BLOOD PRESSURE: 86 MMHG

## 2020-10-16 PROCEDURE — 3017F COLORECTAL CA SCREEN DOC REV: CPT | Performed by: STUDENT IN AN ORGANIZED HEALTH CARE EDUCATION/TRAINING PROGRAM

## 2020-10-16 PROCEDURE — G8427 DOCREV CUR MEDS BY ELIG CLIN: HCPCS | Performed by: STUDENT IN AN ORGANIZED HEALTH CARE EDUCATION/TRAINING PROGRAM

## 2020-10-16 PROCEDURE — G8420 CALC BMI NORM PARAMETERS: HCPCS | Performed by: STUDENT IN AN ORGANIZED HEALTH CARE EDUCATION/TRAINING PROGRAM

## 2020-10-16 PROCEDURE — 99214 OFFICE O/P EST MOD 30 MIN: CPT | Performed by: STUDENT IN AN ORGANIZED HEALTH CARE EDUCATION/TRAINING PROGRAM

## 2020-10-16 PROCEDURE — 1111F DSCHRG MED/CURRENT MED MERGE: CPT | Performed by: STUDENT IN AN ORGANIZED HEALTH CARE EDUCATION/TRAINING PROGRAM

## 2020-10-16 PROCEDURE — G8484 FLU IMMUNIZE NO ADMIN: HCPCS | Performed by: STUDENT IN AN ORGANIZED HEALTH CARE EDUCATION/TRAINING PROGRAM

## 2020-10-16 PROCEDURE — 1036F TOBACCO NON-USER: CPT | Performed by: STUDENT IN AN ORGANIZED HEALTH CARE EDUCATION/TRAINING PROGRAM

## 2020-10-16 RX ORDER — INSULIN LISPRO 100 [IU]/ML
INJECTION, SOLUTION INTRAVENOUS; SUBCUTANEOUS
COMMUNITY
Start: 2020-10-12

## 2020-10-16 RX ORDER — PEN NEEDLE, DIABETIC 31 GX5/16"
NEEDLE, DISPOSABLE MISCELLANEOUS
COMMUNITY
Start: 2020-09-14

## 2020-10-16 RX ORDER — PREGABALIN 225 MG/1
CAPSULE ORAL
COMMUNITY
Start: 2020-09-29

## 2020-10-16 RX ORDER — BUPRENORPHINE HYDROCHLORIDE, NALOXONE HYDROCHLORIDE 8; 2 MG/1; MG/1
FILM, SOLUBLE BUCCAL; SUBLINGUAL
COMMUNITY
Start: 2020-07-29 | End: 2022-03-09

## 2020-10-16 RX ORDER — INSULIN DEGLUDEC 200 U/ML
INJECTION, SOLUTION SUBCUTANEOUS
COMMUNITY
Start: 2020-07-21

## 2020-10-16 RX ORDER — BLOOD SUGAR DIAGNOSTIC
STRIP MISCELLANEOUS
COMMUNITY
Start: 2020-08-15

## 2020-10-16 RX ORDER — ONDANSETRON 4 MG/1
4 TABLET, ORALLY DISINTEGRATING ORAL EVERY 8 HOURS PRN
COMMUNITY
Start: 2020-03-21 | End: 2022-03-09

## 2020-10-16 RX ORDER — LANCING DEVICE
EACH MISCELLANEOUS
COMMUNITY
Start: 2019-06-05

## 2020-10-16 RX ORDER — VITAMIN B COMPLEX
1000 TABLET ORAL 2 TIMES DAILY WITH MEALS
COMMUNITY
Start: 2020-03-24 | End: 2021-03-24

## 2020-10-16 NOTE — PROGRESS NOTES
Endovascular Neurosurgery outpatient follow-up note    Pt Name: Yonathan Galindo  MRN: [id number]  YOB: 1955  Date of evaluation: 10/16/2020  Primary Care Physician: Carolynn Ivan MD  Reason for evaluation: This is a follow-up after recent admission with left common carotid artery thrombus. SUBJECTIVE:   History of Chief Complaint:    Patient is a 73-year-old female who is -American with a past medical history including hypertension, depression, hyperlipidemia and history of breast cancer.  She had prior history of right MCA/parietal ischemic stroke. Brenda Olmos is on aspirin 81 mg daily at home.      She recently presented in September with acute onset right hand numbness. [address] stroke scale at 1.  No TPA given.  Vessel images with left common carotid artery clot noted.  She was loaded with Plavix 300 mg, continued on aspirin 81 mg daily and started on heparin drip.     LDL at 76, HDL at 69, total cholesterol at 162.     Transthoracic Echocardiography Report (TTE) 9/17/2020  Global left ventricular systolic function is normal.Estimated ejection   fraction is 50%. Mild inferior wall hypokinesis. Grade I (mild) left ventricular diastolic dysfunction. Normal right ventricular size and function. Calcified mitral valve leaflets with moderate regurgitation. Estimated right ventricular systolic pressure is 49VOBA. No pericardial effusion seen. She was treated with heparin drip and dual antiplatelet therapy. Repeat vessel images showed improvement in the underlying left common carotid thrombus with less than 25% residual stenosis. She was discharged on Xarelto 20 mg daily and aspirin 81 mg daily and Plavix was discontinued. Plan was to continue Xarelto and aspirin for 3 months then switch to aspirin and Plavix with repeat vessel images. She presented today by herself. She reported doing good on Xarelto and aspirin. No new complaints. No new weakness.   No new numbness. Allergies  is allergic to compazine [prochlorperazine maleate]; prochlorperazine edisylate; reglan [metoclopramide]; and vicodin [hydrocodone-acetaminophen]. Medications  Prior to Admission medications    Medication Sig Start Date End Date Taking? Authorizing Provider   atorvastatin (LIPITOR) 80 MG tablet Take 1 tablet by mouth nightly 9/23/20   ASH Herrera CNP   rivaroxaban (XARELTO) 20 MG TABS tablet Take 1 tablet by mouth daily 9/23/20   ASH Herrera - CNP   lisinopril-hydrochlorothiazide (PRINZIDE;ZESTORETIC) 20-12.5 MG per tablet Take 1 tablet by mouth daily    Historical Provider, MD   aspirin 81 MG tablet Take 81 mg by mouth daily    Historical Provider, MD   butalbital-APAP-caffeine (FIORICET) -40 MG CAPS per capsule Take 1 capsule by mouth every 4 hours as needed for Headaches 4/25/18   Kendy A Lump, APRN - CNP   metoprolol (LOPRESSOR) 50 MG tablet Take 1 tablet by mouth daily. 12/12/14   Turner Alberto MD   Multiple Vitamin (MULTIVITAMIN) tablet Take 1 tablet by mouth daily. 12/12/14   Turner Albetro MD   citalopram (CELEXA) 20 MG tablet Take 1 tablet by mouth daily. 12/12/14   Turner Alberto MD   Blood Pressure KIT Check blood pressure daily. 3/13/14   Shawanda Sood MD    Scheduled Meds:  Continuous Infusions:  PRN Meds:.  Past Medical History   has a past medical history of Breast cancer ([facility] [address] Depression, HTN (hypertension), Hyperlipidemia, Ovarian cyst, bilateral, Pancreatitis, and Renal stone. Past Surgical History   has a past surgical history that includes Inguinal hernia repair; Appendectomy; lymphadenectomy (Bilateral); Tunneled venous port placement; Breast lumpectomy; and Breast surgery. Social History   reports that she has never smoked. She has never used smokeless tobacco.   reports current alcohol use. reports no history of drug use.   Family History  family history includes Coronary Art Dis in her father, mother, and sister; Diabetes in her father; Heart Disease in her mother; High Blood Pressure in her father. Review of Systems:  CONSTITUTIONAL:  negative for fevers, chills, fatigue and malaise    EYES:  negative for double vision, blurred vision and photophobia     HEENT:  negative for tinnitus, epistaxis and sore throat    RESPIRATORY:  negative for cough, shortness of breath, wheezing    CARDIOVASCULAR:  negative for chest pain, palpitations, syncope, edema    GASTROINTESTINAL:  negative for nausea, vomiting    GENITOURINARY:  negative for incontinence    MUSCULOSKELETAL:  negative for neck or back pain    NEUROLOGICAL:  Negative for weakness and tingling  negative for headaches and dizziness    PSYCHIATRIC:  negative for anxiety      Review of systems otherwise negative. OBJECTIVE:   Vitals: There were no vitals taken for this visit. General appearance: Lying in bed, NAD. HEENT: Atraumatic. Neck: Neck is supple. Lungs: No respiratory distress noted. Heart: normal sinus rhythm on tele. .   Abdomen: Soft nontender. Extremities: No lower limb edema noted. Groin: Groin looks clean, no hematoma noted. Neurologic: He is awake, following simple commands appropriately, able to name simple objects, intact comprehension, no dysarthria noted. CN: Has intact extraocular muscles movements, no facial droop noted, intact sensation on the face on trigeminal distribution bilaterally. MOTOR: Has good strength in both upper and lower extremities, moving both upper and lower extremities against gravity with no drift. SENSORY: Intact sensation to simple touch bilaterally in both upper and lower extremities. COORDINATION: Intact finger-nose test bilaterally with no dysmetria noted. LABS:   No results for input(s): WBC, HGB, HCT, PLT, NA, K, CL, CO2, BUN, CREATININE, MG, PHOS, CALCIUM, PTT, INR, AST, ALT, BILITOT, BILIDIR, NITRU, COLORU, BACTERIA in the last 72 hours.     Invalid input(s): PT, WBCU, RBCU, LEUKOCYTESUA  No results for input(s): ALKPHOS, ALT, AST, BILITOT, BILIDIR, LABALBU, AMYLASE, LIPASE in the last 72 hours. RADIOLOGY:   Images were personally reviewed including:    CTA maging:  Previously seen free-floating thrombus in the central portion of lumen of mid    left CCA no longer present although there is still eccentric soft plaque with    trace intramural contrast versus contrast extending between thrombus and    vessel wall along left aspect of the vessel.  Resulting stenosis at this    location is only about 25%.         50% stenosis right CCA.         Otherwise patent cervical and intracranial vasculature. IMPRESSIONS:     1. History of left common carotid artery thrombus. Etiology unclear can be due to hypercoagulable state with history of breast cancer. 2. Follow-up CTA head and neck with improvement in the underlying clot with residual 25% stenosis. 3. On medical management. PLANS:   1. Continue medical management with Xarelto 20 mg daily and aspirin 81 mg daily for 3 months. Then switch to dual antiplatelet therapy with aspirin 81 and Plavix 75 mg daily. 2. Would recommend screening for cancer. I advised the patient to follow-up with her oncologist for history of breast cancer. 3. CTA neck in 3 months in December with follow-up with Dr. Katrin Douglas. Thank you for the interesting evaluation.      Gio Govea MD  Pager [phone]  Stroke, [facility]  [id number]  Electronically signed 10/16/2020 at 2:49 PM

## 2020-10-22 NOTE — TELEPHONE ENCOUNTER
Pt called again regarding her medication refill. Pt states she only has two tablets left and needs the refill. Refill was placed two days ago.

## 2020-10-23 RX ORDER — ATORVASTATIN CALCIUM 80 MG/1
80 TABLET, FILM COATED ORAL NIGHTLY
Qty: 30 TABLET | Refills: 3 | Status: SHIPPED | OUTPATIENT
Start: 2020-10-23 | End: 2021-06-29

## 2020-12-10 ENCOUNTER — HOSPITAL ENCOUNTER (EMERGENCY)
Age: 65
Discharge: HOME | End: 2020-12-10
Attending: EMERGENCY MEDICINE
Payer: COMMERCIAL

## 2020-12-10 ENCOUNTER — APPOINTMENT (OUTPATIENT)
Dept: GENERAL RADIOLOGY | Age: 65
End: 2020-12-10
Payer: COMMERCIAL

## 2020-12-10 VITALS
RESPIRATION RATE: 17 BRPM | WEIGHT: 110 LBS | TEMPERATURE: 98.9 F | HEIGHT: 59 IN | BODY MASS INDEX: 22.18 KG/M2 | HEART RATE: 105 BPM | DIASTOLIC BLOOD PRESSURE: 80 MMHG | SYSTOLIC BLOOD PRESSURE: 119 MMHG | OXYGEN SATURATION: 97 %

## 2020-12-10 PROCEDURE — 99284 EMERGENCY DEPT VISIT MOD MDM: CPT

## 2020-12-10 PROCEDURE — 6360000002 HC RX W HCPCS: Performed by: EMERGENCY MEDICINE

## 2020-12-10 PROCEDURE — 72040 X-RAY EXAM NECK SPINE 2-3 VW: CPT

## 2020-12-10 PROCEDURE — 96374 THER/PROPH/DIAG INJ IV PUSH: CPT

## 2020-12-10 RX ORDER — MORPHINE SULFATE 10 MG/ML
6 INJECTION, SOLUTION INTRAMUSCULAR; INTRAVENOUS ONCE
Status: DISCONTINUED | OUTPATIENT
Start: 2020-12-10 | End: 2020-12-10

## 2020-12-10 RX ORDER — MORPHINE SULFATE 10 MG/ML
6 INJECTION, SOLUTION INTRAMUSCULAR; INTRAVENOUS ONCE
Status: COMPLETED | OUTPATIENT
Start: 2020-12-10 | End: 2020-12-10

## 2020-12-10 RX ORDER — CYCLOBENZAPRINE HCL 10 MG
10 TABLET ORAL NIGHTLY PRN
Qty: 10 TABLET | Refills: 0 | Status: SHIPPED | OUTPATIENT
Start: 2020-12-10 | End: 2020-12-20

## 2020-12-10 RX ADMIN — MORPHINE SULFATE 6 MG: 10 INJECTION INTRAVENOUS at 10:59

## 2020-12-10 ASSESSMENT — PAIN SCALES - GENERAL
PAINLEVEL_OUTOF10: 7
PAINLEVEL_OUTOF10: 4
PAINLEVEL_OUTOF10: 7

## 2020-12-10 ASSESSMENT — PAIN DESCRIPTION - PAIN TYPE
TYPE: ACUTE PAIN
TYPE: ACUTE PAIN

## 2020-12-10 ASSESSMENT — PAIN DESCRIPTION - LOCATION: LOCATION: NECK

## 2020-12-10 NOTE — ED PROVIDER NOTES
EMERGENCY DEPARTMENT ENCOUNTER    Pt Name: Taran Simon  MRN: [id number]  [place] 1955  Date of evaluation: 12/10/20  CHIEF COMPLAINT       Chief Complaint   Patient presents with    Neck Pain     Left sided x 3 days recent stroke (September)     HISTORY OF PRESENT ILLNESS   29-year-old female presents to the emergency room with left-sided paraspinal neck pain. Patient denies any falls or trauma. She notes a soreness in the neck and shoulder over the last several days. She has no numbness or weakness. Patient was concerned because she did have a stroke back in September that was on the left side. Patient has soreness with range of motion. Patient does report some mild residual numbness from her stroke. She does not note any new symptoms other than the pain today. REVIEW OF SYSTEMS     Review of Systems   All other systems reviewed and are negative.       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    Major depressive disorder, recurrent episode ([address] F33.9    Alcohol abuse F10.10    Alcohol-induced chronic pancreatitis ([facility] [address] K86.0    Hypertensive urgency I16.0    Uncontrolled hypertension I10    Gastritis K29.70    Mild dehydration E86.0    Elevated LFTs R79.89    Chronic ankle pain M25.579, G89.29    Medication refill Z76.0    Acute ischemic stroke (HCC) I63.9     SURGICAL HISTORY       Past Surgical History:   Procedure Laterality Date    APPENDECTOMY      BREAST LUMPECTOMY      bilateral breasts    BREAST SURGERY      INGUINAL HERNIA REPAIR      LYMPHADENECTOMY Bilateral     TUNNELED VENOUS PORT PLACEMENT       CURRENT MEDICATIONS       Previous Medications    ACCU-CHEK MINDA PLUS STRIP    use 1 TEST STRIP to TEST BLOOD SUGAR three times a day    ASPIRIN 81 MG TABLET    Take 81 mg by mouth daily    ATORVASTATIN (LIPITOR) 80 MG TABLET    Take 1 tablet by mouth nightly    B-D UF III MINI PEN NEEDLES 31G X 5 MM MISC    use 1 PEN NEEDLE to inject MEDICATION subcutaneously four times a day    BLOOD PRESSURE KIT    Check blood pressure daily. BUTALBITAL-APAP-CAFFEINE (FIORICET) -40 MG CAPS PER CAPSULE    Take 1 capsule by mouth every 4 hours as needed for Headaches    CITALOPRAM (CELEXA) 20 MG TABLET    Take 1 tablet by mouth daily. HUMALOG KWIKPEN 100 UNIT/ML SOPN    inject 7 units subcutaneously once daily BEFORE A MEAL    INSULIN DEGLUDEC (TRESIBA FLEXTOUCH) 200 UNIT/ML SOPN    INJECT 35 UNITS UNDER THE SKIN ONCE DAILY    LANCET DEVICES (LANCING DEVICE) MISC    USE AS DIRECTED [facility] LANCET STRIP BEFORE TESTING BLOOD SUGAR    LISINOPRIL-HYDROCHLOROTHIAZIDE (PRINZIDE;ZESTORETIC) 20-12.5 MG PER TABLET    Take 1 tablet by mouth daily    METOPROLOL (LOPRESSOR) 50 MG TABLET    Take 1 tablet by mouth daily. MULTIPLE VITAMIN (MULTIVITAMIN) TABLET    Take 1 tablet by mouth daily. ONDANSETRON (ZOFRAN-ODT) 4 MG DISINTEGRATING TABLET    Take 4 mg by mouth every 8 hours as needed    PREGABALIN (LYRICA) 225 MG CAPSULE    take 1 capsule by mouth twice a day    RIVAROXABAN (XARELTO) 20 MG TABS TABLET    Take 1 tablet by mouth daily    SUBOXONE 8-2 MG FILM SL FILM    dissolve 1/2 FILM under the tongue once daily    VITAMIN D (CHOLECALCIFEROL) 25 MCG (1000 UT) TABS TABLET    Take 1,000 Units by mouth 2 times daily (with meals)     ALLERGIES     is allergic to doxycycline; reglan [metoclopramide]; tramadol; compazine [prochlorperazine maleate]; prochlorperazine edisylate; and vicodin [hydrocodone-acetaminophen].   FAMILY HISTORY history and exam I am not concerned for acute neurologic problem. Patient discharged with primary care follow-up instructions. CRITICAL CARE:       PROCEDURES:    Procedures    DIAGNOSTIC RESULTS   EKG:All EKG's are interpreted by the Emergency Department Physician who either signs or Co-signs this chart in the absence of a cardiologist.        RADIOLOGY:All plain film, CT, MRI, and formal ultrasound images (except ED bedside ultrasound) are read by the radiologist, see reports below, unless otherwisenoted in MDM or here. XR CERVICAL SPINE (2-3 VIEWS)   Final Result   No acute abnormality of the cervical spine. LABS: All lab results were reviewed by myself, and all abnormals are listed below. Labs Reviewed - No data to display    EMERGENCY DEPARTMENTCOURSE:         Vitals:    Vitals:    12/10/20 1023 12/10/20 1028 12/10/20 1100   BP: 100/73  119/80   Pulse: 118  105   Resp: (!) 119  17   Temp: 98.9 °F (37.2 °C)     SpO2: 98%  97%   Weight:  110 lb (49.9 kg)    Height:  4' 11\" (1.499 m)        The patient was given the following medications while in the emergency department:  Orders Placed This Encounter   Medications    DISCONTD: morphine (PF) injection 6 mg    morphine (PF) injection 6 mg    cyclobenzaprine (FLEXERIL) 10 MG tablet     Sig: Take 1 tablet by mouth nightly as needed for Muscle spasms     Dispense:  10 tablet     Refill:  0     CONSULTS:  None    FINAL IMPRESSION      1.  Neck pain          DISPOSITION/PLAN   DISPOSITION Decision To Discharge 12/10/2020 12:04:37 PM      PATIENT REFERRED TO:  Yu Guzman MD  [address] [place] [address] Drive [address] [place] [address]  [phone]    Schedule an appointment as soon as possible for a visit in 1 week      DISCHARGE MEDICATIONS:  New Prescriptions    CYCLOBENZAPRINE (FLEXERIL) 10 MG TABLET    Take 1 tablet by mouth nightly as needed for Muscle spasms     Judy Wright MD  Attending Emergency Physician                 Brigette Stewart Pierre Mcguire MD  12/10/20 [id number]

## 2021-02-12 ENCOUNTER — HOSPITAL ENCOUNTER (OUTPATIENT)
Dept: CT IMAGING | Age: 66
Discharge: HOME | End: 2021-02-14
Payer: COMMERCIAL

## 2021-02-12 DIAGNOSIS — I63.9 CEREBRAL INFARCTION, LEFT HEMISPHERE (HCC): ICD-10-CM

## 2021-02-12 LAB
CREAT SERPL-MCNC: 0.54 MG/DL (ref 0.5–0.9)
GFR AFRICAN AMERICAN: >60 ML/MIN
GFR NON-AFRICAN AMERICAN: >60 ML/MIN
GFR SERPL CREATININE-BSD FRML MDRD: NORMAL ML/MIN/{1.73_M2}
GFR SERPL CREATININE-BSD FRML MDRD: NORMAL ML/MIN/{1.73_M2}

## 2021-02-12 PROCEDURE — 82565 ASSAY OF CREATININE: CPT

## 2021-02-12 PROCEDURE — 70496 CT ANGIOGRAPHY HEAD: CPT

## 2021-02-12 PROCEDURE — 6360000004 HC RX CONTRAST MEDICATION: Performed by: PSYCHIATRY & NEUROLOGY

## 2021-02-12 PROCEDURE — 36415 COLL VENOUS BLD VENIPUNCTURE: CPT

## 2021-02-12 PROCEDURE — 2580000003 HC RX 258: Performed by: PSYCHIATRY & NEUROLOGY

## 2021-02-12 RX ORDER — 0.9 % SODIUM CHLORIDE 0.9 %
80 INTRAVENOUS SOLUTION INTRAVENOUS ONCE
Status: COMPLETED | OUTPATIENT
Start: 2021-02-12 | End: 2021-02-12

## 2021-02-12 RX ORDER — SODIUM CHLORIDE 0.9 % (FLUSH) 0.9 %
10 SYRINGE (ML) INJECTION PRN
Status: DISCONTINUED | OUTPATIENT
Start: 2021-02-12 | End: 2021-02-15 | Stop reason: HOSPADM

## 2021-02-12 RX ADMIN — SODIUM CHLORIDE 80 ML: 9 INJECTION, SOLUTION INTRAVENOUS at 11:28

## 2021-02-12 RX ADMIN — IOPAMIDOL 75 ML: 755 INJECTION, SOLUTION INTRAVENOUS at 11:28

## 2021-02-12 RX ADMIN — Medication 10 ML: at 11:28

## 2021-06-24 DIAGNOSIS — I65.22 LEFT CAROTID ARTERY STENOSIS: Primary | ICD-10-CM

## 2021-06-29 RX ORDER — ATORVASTATIN CALCIUM 80 MG/1
TABLET, FILM COATED ORAL
Qty: 30 TABLET | Refills: 5 | Status: SHIPPED | OUTPATIENT
Start: 2021-06-29 | End: 2022-03-09

## 2021-06-29 RX ORDER — RIVAROXABAN 20 MG/1
TABLET, FILM COATED ORAL
Qty: 30 TABLET | Refills: 3 | OUTPATIENT
Start: 2021-06-29

## 2021-06-29 RX ORDER — CLOPIDOGREL BISULFATE 75 MG/1
75 TABLET ORAL DAILY
Qty: 30 TABLET | Refills: 5 | Status: SHIPPED | OUTPATIENT
Start: 2021-06-29 | End: 2022-03-09

## 2021-06-29 NOTE — TELEPHONE ENCOUNTER
Chart reviewed. Pt is only meant to be on xarelto for 3 mo starting 9/2020 then switched to asa and plavix. cta 2/2021 confirmed resolution of the L CCA thrombus. Please contact pt about the medication changes (xarelto stopped, plavix started). Please schedule follow up appointment with neurology asap, she should have had an appointment 12/2020 for this medication change. Pt also needs hypercoag/cancer workup given the thrombus finding.

## 2022-03-09 ENCOUNTER — HOSPITAL ENCOUNTER (EMERGENCY)
Age: 67
Discharge: HOME | End: 2022-03-09
Attending: EMERGENCY MEDICINE
Payer: COMMERCIAL

## 2022-03-09 ENCOUNTER — APPOINTMENT (OUTPATIENT)
Dept: CT IMAGING | Age: 67
End: 2022-03-09
Payer: COMMERCIAL

## 2022-03-09 VITALS
WEIGHT: 115 LBS | HEIGHT: 59 IN | BODY MASS INDEX: 23.18 KG/M2 | RESPIRATION RATE: 22 BRPM | DIASTOLIC BLOOD PRESSURE: 88 MMHG | OXYGEN SATURATION: 98 % | HEART RATE: 72 BPM | SYSTOLIC BLOOD PRESSURE: 129 MMHG | TEMPERATURE: 98.1 F

## 2022-03-09 DIAGNOSIS — R51.9 ACUTE NONINTRACTABLE HEADACHE, UNSPECIFIED HEADACHE TYPE: Primary | ICD-10-CM

## 2022-03-09 DIAGNOSIS — I10 PRIMARY HYPERTENSION: ICD-10-CM

## 2022-03-09 LAB
ABSOLUTE EOS #: 0.16 K/UL (ref 0–0.44)
ABSOLUTE IMMATURE GRANULOCYTE: 0.01 K/UL (ref 0–0.3)
ABSOLUTE LYMPH #: 2.25 K/UL (ref 1.1–3.7)
ABSOLUTE MONO #: 0.39 K/UL (ref 0.1–1.2)
ANION GAP SERPL CALCULATED.3IONS-SCNC: 10 MMOL/L (ref 9–17)
BASOPHILS # BLD: 1 % (ref 0–2)
BASOPHILS ABSOLUTE: 0.04 K/UL (ref 0–0.2)
BUN BLDV-MCNC: 10 MG/DL (ref 8–23)
BUN/CREAT BLD: 15 (ref 9–20)
CALCIUM SERPL-MCNC: 9.2 MG/DL (ref 8.6–10.4)
CHLORIDE BLD-SCNC: 104 MMOL/L (ref 98–107)
CO2: 26 MMOL/L (ref 20–31)
CREAT SERPL-MCNC: 0.65 MG/DL (ref 0.5–0.9)
EOSINOPHILS RELATIVE PERCENT: 3 % (ref 1–4)
GFR AFRICAN AMERICAN: >60 ML/MIN
GFR NON-AFRICAN AMERICAN: >60 ML/MIN
GFR SERPL CREATININE-BSD FRML MDRD: ABNORMAL ML/MIN/{1.73_M2}
GLUCOSE BLD-MCNC: 161 MG/DL (ref 65–105)
GLUCOSE BLD-MCNC: 184 MG/DL (ref 70–99)
HCT VFR BLD CALC: 40.3 % (ref 36.3–47.1)
HEMOGLOBIN: 13.6 G/DL (ref 11.9–15.1)
IMMATURE GRANULOCYTES: 0 %
LYMPHOCYTES # BLD: 42 % (ref 24–43)
MCH RBC QN AUTO: 31.5 PG (ref 25.2–33.5)
MCHC RBC AUTO-ENTMCNC: 33.7 G/DL (ref 28.4–34.8)
MCV RBC AUTO: 93.3 FL (ref 82.6–102.9)
MONOCYTES # BLD: 7 % (ref 3–12)
NRBC AUTOMATED: 0 PER 100 WBC
PDW BLD-RTO: 11.8 % (ref 11.8–14.4)
PLATELET # BLD: 256 K/UL (ref 138–453)
PMV BLD AUTO: 10.7 FL (ref 8.1–13.5)
POTASSIUM SERPL-SCNC: 3.8 MMOL/L (ref 3.7–5.3)
RBC # BLD: 4.32 M/UL (ref 3.95–5.11)
SEG NEUTROPHILS: 47 % (ref 36–65)
SEGMENTED NEUTROPHILS ABSOLUTE COUNT: 2.57 K/UL (ref 1.5–8.1)
SODIUM BLD-SCNC: 140 MMOL/L (ref 135–144)
WBC # BLD: 5.4 K/UL (ref 3.5–11.3)

## 2022-03-09 PROCEDURE — 6360000002 HC RX W HCPCS: Performed by: EMERGENCY MEDICINE

## 2022-03-09 PROCEDURE — 96374 THER/PROPH/DIAG INJ IV PUSH: CPT

## 2022-03-09 PROCEDURE — 2500000003 HC RX 250 WO HCPCS: Performed by: EMERGENCY MEDICINE

## 2022-03-09 PROCEDURE — 70450 CT HEAD/BRAIN W/O DYE: CPT

## 2022-03-09 PROCEDURE — 93005 ELECTROCARDIOGRAM TRACING: CPT | Performed by: EMERGENCY MEDICINE

## 2022-03-09 PROCEDURE — 96376 TX/PRO/DX INJ SAME DRUG ADON: CPT

## 2022-03-09 PROCEDURE — 99283 EMERGENCY DEPT VISIT LOW MDM: CPT

## 2022-03-09 PROCEDURE — 96375 TX/PRO/DX INJ NEW DRUG ADDON: CPT

## 2022-03-09 PROCEDURE — 80048 BASIC METABOLIC PNL TOTAL CA: CPT

## 2022-03-09 PROCEDURE — 82947 ASSAY GLUCOSE BLOOD QUANT: CPT

## 2022-03-09 PROCEDURE — 85025 COMPLETE CBC W/AUTO DIFF WBC: CPT

## 2022-03-09 RX ORDER — BUTALBITAL, ACETAMINOPHEN AND CAFFEINE 300; 40; 50 MG/1; MG/1; MG/1
1 CAPSULE ORAL EVERY 6 HOURS PRN
Qty: 20 CAPSULE | Refills: 0 | Status: SHIPPED | OUTPATIENT
Start: 2022-03-09

## 2022-03-09 RX ORDER — COLCHICINE 0.6 MG/1
0.6 TABLET ORAL 2 TIMES DAILY
COMMUNITY

## 2022-03-09 RX ORDER — PANTOPRAZOLE SODIUM 40 MG/1
40 TABLET, DELAYED RELEASE ORAL DAILY
COMMUNITY

## 2022-03-09 RX ORDER — ROSUVASTATIN CALCIUM 40 MG/1
40 TABLET, COATED ORAL EVERY EVENING
COMMUNITY

## 2022-03-09 RX ORDER — MORPHINE SULFATE 4 MG/ML
4 INJECTION, SOLUTION INTRAMUSCULAR; INTRAVENOUS ONCE
Status: COMPLETED | OUTPATIENT
Start: 2022-03-09 | End: 2022-03-09

## 2022-03-09 RX ORDER — ALLOPURINOL 100 MG/1
100 TABLET ORAL DAILY
COMMUNITY

## 2022-03-09 RX ORDER — ONDANSETRON 2 MG/ML
4 INJECTION INTRAMUSCULAR; INTRAVENOUS ONCE
Status: COMPLETED | OUTPATIENT
Start: 2022-03-09 | End: 2022-03-09

## 2022-03-09 RX ORDER — METOPROLOL TARTRATE 5 MG/5ML
5 INJECTION INTRAVENOUS ONCE
Status: COMPLETED | OUTPATIENT
Start: 2022-03-09 | End: 2022-03-09

## 2022-03-09 RX ADMIN — METOPROLOL TARTRATE 5 MG: 5 INJECTION INTRAVENOUS at 12:33

## 2022-03-09 RX ADMIN — MORPHINE SULFATE 4 MG: 4 INJECTION, SOLUTION INTRAMUSCULAR; INTRAVENOUS at 14:00

## 2022-03-09 RX ADMIN — ONDANSETRON 4 MG: 2 INJECTION INTRAMUSCULAR; INTRAVENOUS at 14:01

## 2022-03-09 RX ADMIN — MORPHINE SULFATE 4 MG: 4 INJECTION, SOLUTION INTRAMUSCULAR; INTRAVENOUS at 12:33

## 2022-03-09 ASSESSMENT — PAIN DESCRIPTION - DESCRIPTORS
DESCRIPTORS: SHARP
DESCRIPTORS: SHARP

## 2022-03-09 ASSESSMENT — ENCOUNTER SYMPTOMS
COLOR CHANGE: 0
EYE REDNESS: 0
ABDOMINAL PAIN: 0
SHORTNESS OF BREATH: 0
CONSTIPATION: 0
COUGH: 0
EYE DISCHARGE: 0
VOMITING: 0
DIARRHEA: 0
FACIAL SWELLING: 0

## 2022-03-09 ASSESSMENT — PAIN DESCRIPTION - ORIENTATION
ORIENTATION: LEFT;POSTERIOR
ORIENTATION: LEFT;POSTERIOR

## 2022-03-09 ASSESSMENT — PAIN DESCRIPTION - PAIN TYPE
TYPE: ACUTE PAIN
TYPE: ACUTE PAIN

## 2022-03-09 ASSESSMENT — PAIN DESCRIPTION - LOCATION
LOCATION: HEAD
LOCATION: HEAD

## 2022-03-09 ASSESSMENT — PAIN SCALES - GENERAL
PAINLEVEL_OUTOF10: 8
PAINLEVEL_OUTOF10: 6
PAINLEVEL_OUTOF10: 8
PAINLEVEL_OUTOF10: 10
PAINLEVEL_OUTOF10: 10

## 2022-03-09 ASSESSMENT — PAIN DESCRIPTION - FREQUENCY: FREQUENCY: INTERMITTENT

## 2022-03-09 NOTE — ED NOTES
Followed up with patient, pain in head is improved, but not gone completely. Dr. Georgette Martinez CT results with patient. Updated Dr. Gumaro Roblero.      Lefty Levine., RN  33/46/01 [id number]

## 2022-03-09 NOTE — ED PROVIDER NOTES
[address] [place] [address] ED  EMERGENCY DEPARTMENT ENCOUNTER      Pt Name: Faby Weber  MRN: [id number]  [place] 1955  Date of evaluation: 3/9/2022  Provider: Garrett Bardales MD    CHIEF COMPLAINT       Chief Complaint   Patient presents with    Headache         HISTORY OF PRESENT ILLNESS  (Location/Symptom, Timing/Onset, Context/Setting, Quality, Duration, Modifying Factors, Severity.)   Faby Weber is a 73 y.o. female who presents to the emergency department for headache. It is in the left occipital area and it started yesterday and was not precipitated by any trauma. No fever or stiff neck. She took Tylenol but it did not help much and she rated the pain is a 10. She had a stroke 2 years ago and since then her right arm has been tingling and its like that now, perhaps slightly worse. She does not have any weakness. No vomiting. Nursing Notes were reviewed. ALLERGIES     Doxycycline, Reglan [metoclopramide], Tramadol, Compazine [prochlorperazine maleate], Prochlorperazine edisylate, and Vicodin [hydrocodone-acetaminophen]    CURRENT MEDICATIONS       Previous Medications    ACCU-CHEK MINDA PLUS STRIP    use 1 TEST STRIP to TEST BLOOD SUGAR three times a day    ALLOPURINOL (ZYLOPRIM) 100 MG TABLET    Take 100 mg by mouth daily    ASPIRIN 81 MG TABLET    Take 81 mg by mouth daily    B-D UF III MINI PEN NEEDLES 31G X 5 MM MISC    use 1 PEN NEEDLE to inject MEDICATION subcutaneously four times a day    BLOOD PRESSURE KIT    Check blood pressure daily.     COLCHICINE (COLCRYS) 0.6 MG TABLET    Take 0.6 mg by mouth 2 times daily    HUMALOG KWIKPEN 100 UNIT/ML SOPN    inject 7 units subcutaneously once daily BEFORE A MEAL    INSULIN DEGLUDEC (TRESIBA FLEXTOUCH) 200 UNIT/ML SOPN    INJECT 35 UNITS UNDER THE SKIN ONCE DAILY    LANCET DEVICES (LANCING DEVICE) MISC    USE AS DIRECTED [facility] LANCET STRIP BEFORE TESTING BLOOD SUGAR    LIPASE-PROTEASE-AMYLASE (CREON) 33472-14173 UNITS DELAYED RELEASE CAPSULE    Take 36,000 Units by mouth 3 times daily (with meals)    LISINOPRIL-HYDROCHLOROTHIAZIDE (PRINZIDE;ZESTORETIC) 20-12.5 MG PER TABLET    Take 1 tablet by mouth daily    METOPROLOL (LOPRESSOR) 50 MG TABLET    Take 1 tablet by mouth daily. MULTIPLE VITAMIN (MULTIVITAMIN) TABLET    Take 1 tablet by mouth daily. PANTOPRAZOLE (PROTONIX) 40 MG TABLET    Take 40 mg by mouth daily    PREGABALIN (LYRICA) 225 MG CAPSULE    take 1 capsule by mouth twice a day    RIVAROXABAN (XARELTO) 20 MG TABS TABLET    Take 20 mg by mouth    ROSUVASTATIN (CRESTOR) 40 MG TABLET    Take 40 mg by mouth every evening    VITAMIN D (CHOLECALCIFEROL) 25 MCG (1000 UT) TABS TABLET    Take 1,000 Units by mouth 2 times daily (with meals)       PAST MEDICAL HISTORY         Diagnosis Date    Breast cancer ([facility] [address]     Depression     HTN (hypertension)     Hyperlipidemia     Ovarian cyst, bilateral     Pancreatitis     Renal stone        SURGICAL HISTORY           Procedure Laterality Date    APPENDECTOMY      BREAST LUMPECTOMY      bilateral breasts    BREAST SURGERY      INGUINAL HERNIA REPAIR      LYMPHADENECTOMY Bilateral     TUNNELED VENOUS PORT PLACEMENT           FAMILY HISTORY           Problem Relation Age of Onset    Coronary Art Dis Mother     Heart Disease Mother     Coronary Art Dis Father     Diabetes Father     High Blood Pressure Father     Coronary Art Dis Sister      Family Status   Relation Name Status    Mother  (Not Specified)    Father  (Not Specified)    Sister  (Not Specified)        SOCIAL HISTORY      reports that she has never smoked. She has never used smokeless tobacco. She reports current alcohol use. She reports that she does not use drugs. REVIEW OF SYSTEMS    (2-9 systems for level 4, 10 or more for level 5)     Review of Systems   Constitutional: Negative for chills, fatigue and fever. HENT: Negative for congestion, ear discharge and facial swelling.     Eyes: Negative for discharge and redness. Respiratory: Negative for cough and shortness of breath. Cardiovascular: Negative for chest pain. Gastrointestinal: Negative for abdominal pain, constipation, diarrhea and vomiting. Genitourinary: Negative for dysuria and hematuria. Musculoskeletal: Negative for arthralgias. Skin: Negative for color change and rash. Neurological: Positive for headaches. Negative for syncope and numbness. Hematological: Negative for adenopathy. Psychiatric/Behavioral: Negative for confusion. The patient is not nervous/anxious. Except as noted above the remainder of the review of systems was reviewed and negative. PHYSICAL EXAM    (up to 7 for level 4, 8 or more for level 5)     Vitals:    03/09/22 1359 03/09/22 1412 03/09/22 1414 03/09/22 1429   BP: 134/85  136/85 129/88   Pulse: 73 72 71 72   Resp: 9 9 13 22   Temp:       TempSrc:       SpO2: 97% 94% 93% 98%   Weight:       Height:           Physical Exam  Vitals reviewed. Constitutional:       General: She is not in acute distress. Appearance: She is well-developed. She is not diaphoretic. HENT:      Head: Normocephalic and atraumatic. Eyes:      General: No scleral icterus. Right eye: No discharge. Left eye: No discharge. Cardiovascular:      Rate and Rhythm: Normal rate and regular rhythm. Pulmonary:      Effort: Pulmonary effort is normal. No respiratory distress. Breath sounds: Normal breath sounds. No stridor. No wheezing or rales. Abdominal:      General: There is no distension. Palpations: Abdomen is soft. Tenderness: There is no abdominal tenderness. Musculoskeletal:         General: Normal range of motion. Cervical back: Neck supple. Lymphadenopathy:      Cervical: No cervical adenopathy. Skin:     General: Skin is warm and dry. Findings: No erythema or rash. Neurological:      Mental Status: She is alert and oriented to person, place, and time.    Psychiatric: Behavior: Behavior normal.             DIAGNOSTIC RESULTS     EKG: All EKG's are interpreted by the Emergency Department Physician who either signs or Co-signs this chart in the absence of a cardiologist.    RADIOLOGY:   Non-plain film images such as CT, Ultrasound and MRI are read by the radiologist. Plain radiographic images are visualized and preliminarily interpreted by the emergency physician with the below findings:    Interpretation per the Radiologist below, if available at the time of this note:    CT HEAD WO CONTRAST    Result Date: 3/9/2022  EXAMINATION: CT OF THE HEAD WITHOUT CONTRAST  3/9/2022 12:16 pm TECHNIQUE: CT of the head was performed without the administration of intravenous contrast. Dose modulation, iterative reconstruction, and/or weight based adjustment of the mA/kV was utilized to reduce the radiation dose to as low as reasonably achievable. COMPARISON: 02/12/2021. HISTORY: ORDERING SYSTEM PROVIDED HISTORY: Headache TECHNOLOGIST PROVIDED HISTORY: Headache Decision Support Exception - unselect if not a suspected or confirmed emergency medical condition->Emergency Medical Condition (MA) Reason for Exam: Pt c/o headache that began yesterday. FINDINGS: BRAIN/VENTRICLES: There is no acute intracranial hemorrhage, mass effect or midline shift. No abnormal extra-axial fluid collection. The gray-white differentiation is maintained without evidence of an acute infarct. There is prominence of the ventricles and sulci due to global parenchymal volume loss. There are nonspecific areas of hypoattenuation within the periventricular and subcortical white matter, which likely represent chronic microvascular ischemic change. There are old lacune infarcts involving the basal ganglia. There is an area of encephalomalacia in the high right parietal lobe from prior infarct. ORBITS: The visualized portion of the orbits demonstrate no acute abnormality.  SINUSES: The visualized paranasal sinuses and mastoid air lab tests ordering and reviewing of x-ray studies, and admission orders. Aggregate critical care time is 35 minutes including only time during which I was engaged in work directly related to her care and did not include time spent treating other patients simultaneously. CONSULTS:  None    PROCEDURES:  None    FINAL IMPRESSION      1. Acute nonintractable headache, unspecified headache type    2. Primary hypertension          DISPOSITION/PLAN   DISPOSITION Decision To Discharge 03/09/2022 03:19:35 PM      PATIENT REFERRED TO:   Fredy Edwards MD  [address] [phone]      As needed    [facility] ED  [address] [facility]  [phone]    If symptoms worsen      DISCHARGE MEDICATIONS:     New Prescriptions    BUTALBITAL-APAP-CAFFEINE (FIORICET) -40 MG CAPS PER CAPSULE    Take 1 capsule by mouth every 6 hours as needed for Headaches       The care is provided during an unprecedented national emergency due to the novel coronavirus, COVID-19.     (Please note that portions of this note were completed with a voice recognition program.  Efforts were made to edit the dictations but occasionally words are mis-transcribed.)    Lamont Turpin MD  Attending Emergency Physician           Lamont Turpin MD  03/09/22 1608

## 2022-03-10 LAB
EKG ATRIAL RATE: 92 BPM
EKG P AXIS: 30 DEGREES
EKG P-R INTERVAL: 156 MS
EKG Q-T INTERVAL: 382 MS
EKG QRS DURATION: 88 MS
EKG QTC CALCULATION (BAZETT): 472 MS
EKG R AXIS: -33 DEGREES
EKG T AXIS: 52 DEGREES
EKG VENTRICULAR RATE: 92 BPM

## 2022-12-27 ENCOUNTER — APPOINTMENT (OUTPATIENT)
Dept: CT IMAGING | Age: 67
DRG: 069 | End: 2022-12-27
Payer: MEDICARE

## 2022-12-27 ENCOUNTER — HOSPITAL ENCOUNTER (INPATIENT)
Age: 67
LOS: 2 days | Discharge: HOME | DRG: 069 | End: 2022-12-29
Attending: EMERGENCY MEDICINE | Admitting: FAMILY MEDICINE
Payer: MEDICARE

## 2022-12-27 ENCOUNTER — APPOINTMENT (OUTPATIENT)
Dept: GENERAL RADIOLOGY | Age: 67
DRG: 069 | End: 2022-12-27
Payer: MEDICARE

## 2022-12-27 DIAGNOSIS — R29.898 RIGHT ARM WEAKNESS: Primary | ICD-10-CM

## 2022-12-27 PROBLEM — R29.90 STROKE-LIKE SYMPTOM: Status: ACTIVE | Noted: 2022-12-27

## 2022-12-27 LAB
ABSOLUTE EOS #: 0.2 K/UL (ref 0–0.44)
ABSOLUTE IMMATURE GRANULOCYTE: 0.01 K/UL (ref 0–0.3)
ABSOLUTE LYMPH #: 1.85 K/UL (ref 1.1–3.7)
ABSOLUTE MONO #: 0.39 K/UL (ref 0.1–1.2)
ALBUMIN SERPL-MCNC: 4.3 G/DL (ref 3.5–5.2)
ALP BLD-CCNC: 77 U/L (ref 35–104)
ALT SERPL-CCNC: 22 U/L (ref 5–33)
ANION GAP SERPL CALCULATED.3IONS-SCNC: 15 MMOL/L (ref 9–17)
AST SERPL-CCNC: 22 U/L
BASOPHILS # BLD: 1 % (ref 0–2)
BASOPHILS ABSOLUTE: 0.04 K/UL (ref 0–0.2)
BILIRUB SERPL-MCNC: 0.5 MG/DL (ref 0.3–1.2)
BUN BLDV-MCNC: 12 MG/DL (ref 8–23)
BUN/CREAT BLD: 17 (ref 9–20)
CALCIUM SERPL-MCNC: 9.9 MG/DL (ref 8.6–10.4)
CHLORIDE BLD-SCNC: 101 MMOL/L (ref 98–107)
CO2: 26 MMOL/L (ref 20–31)
CREAT SERPL-MCNC: 0.72 MG/DL (ref 0.5–0.9)
EOSINOPHILS RELATIVE PERCENT: 4 % (ref 1–4)
GFR SERPL CREATININE-BSD FRML MDRD: >60 ML/MIN/1.73M2
GLUCOSE BLD-MCNC: 193 MG/DL (ref 65–105)
GLUCOSE BLD-MCNC: 243 MG/DL (ref 70–99)
HCT VFR BLD CALC: 40.8 % (ref 36.3–47.1)
HEMOGLOBIN: 13.5 G/DL (ref 11.9–15.1)
IMMATURE GRANULOCYTES: 0 %
INR BLD: 1.4
LYMPHOCYTES # BLD: 33 % (ref 24–43)
MAGNESIUM: 1.4 MG/DL (ref 1.6–2.6)
MCH RBC QN AUTO: 31.2 PG (ref 25.2–33.5)
MCHC RBC AUTO-ENTMCNC: 33.1 G/DL (ref 28.4–34.8)
MCV RBC AUTO: 94.2 FL (ref 82.6–102.9)
MONOCYTES # BLD: 7 % (ref 3–12)
NRBC AUTOMATED: 0 PER 100 WBC
PDW BLD-RTO: 12.2 % (ref 11.8–14.4)
PLATELET # BLD: 252 K/UL (ref 138–453)
PMV BLD AUTO: 10.6 FL (ref 8.1–13.5)
POTASSIUM SERPL-SCNC: 3.5 MMOL/L (ref 3.7–5.3)
PROTHROMBIN TIME: 16.9 SEC (ref 11.5–14.2)
RBC # BLD: 4.33 M/UL (ref 3.95–5.11)
SEG NEUTROPHILS: 55 % (ref 36–65)
SEGMENTED NEUTROPHILS ABSOLUTE COUNT: 3.2 K/UL (ref 1.5–8.1)
SODIUM BLD-SCNC: 142 MMOL/L (ref 135–144)
TOTAL PROTEIN: 7.7 G/DL (ref 6.4–8.3)
TROPONIN, HIGH SENSITIVITY: 7 NG/L (ref 0–14)
WBC # BLD: 5.7 K/UL (ref 3.5–11.3)

## 2022-12-27 PROCEDURE — 85610 PROTHROMBIN TIME: CPT

## 2022-12-27 PROCEDURE — 6370000000 HC RX 637 (ALT 250 FOR IP): Performed by: EMERGENCY MEDICINE

## 2022-12-27 PROCEDURE — 80053 COMPREHEN METABOLIC PANEL: CPT

## 2022-12-27 PROCEDURE — 84484 ASSAY OF TROPONIN QUANT: CPT

## 2022-12-27 PROCEDURE — 2580000003 HC RX 258: Performed by: EMERGENCY MEDICINE

## 2022-12-27 PROCEDURE — 96375 TX/PRO/DX INJ NEW DRUG ADDON: CPT

## 2022-12-27 PROCEDURE — 85025 COMPLETE CBC W/AUTO DIFF WBC: CPT

## 2022-12-27 PROCEDURE — 99285 EMERGENCY DEPT VISIT HI MDM: CPT

## 2022-12-27 PROCEDURE — 93005 ELECTROCARDIOGRAM TRACING: CPT | Performed by: EMERGENCY MEDICINE

## 2022-12-27 PROCEDURE — 70450 CT HEAD/BRAIN W/O DYE: CPT

## 2022-12-27 PROCEDURE — 70496 CT ANGIOGRAPHY HEAD: CPT

## 2022-12-27 PROCEDURE — 96366 THER/PROPH/DIAG IV INF ADDON: CPT

## 2022-12-27 PROCEDURE — 82947 ASSAY GLUCOSE BLOOD QUANT: CPT

## 2022-12-27 PROCEDURE — 83735 ASSAY OF MAGNESIUM: CPT

## 2022-12-27 PROCEDURE — 6360000004 HC RX CONTRAST MEDICATION: Performed by: EMERGENCY MEDICINE

## 2022-12-27 PROCEDURE — 96365 THER/PROPH/DIAG IV INF INIT: CPT

## 2022-12-27 PROCEDURE — 4A03X5D MEASUREMENT OF ARTERIAL FLOW, INTRACRANIAL, EXTERNAL APPROACH: ICD-10-PCS | Performed by: RADIOLOGY

## 2022-12-27 PROCEDURE — 71045 X-RAY EXAM CHEST 1 VIEW: CPT

## 2022-12-27 PROCEDURE — 99222 1ST HOSP IP/OBS MODERATE 55: CPT | Performed by: PSYCHIATRY & NEUROLOGY

## 2022-12-27 PROCEDURE — 6360000002 HC RX W HCPCS: Performed by: EMERGENCY MEDICINE

## 2022-12-27 PROCEDURE — 1200000000 HC SEMI PRIVATE

## 2022-12-27 RX ORDER — ASPIRIN 81 MG/1
324 TABLET, CHEWABLE ORAL ONCE
Status: COMPLETED | OUTPATIENT
Start: 2022-12-27 | End: 2022-12-27

## 2022-12-27 RX ORDER — 0.9 % SODIUM CHLORIDE 0.9 %
80 INTRAVENOUS SOLUTION INTRAVENOUS ONCE
Status: DISCONTINUED | OUTPATIENT
Start: 2022-12-27 | End: 2022-12-29 | Stop reason: HOSPADM

## 2022-12-27 RX ORDER — BRIMONIDINE TARTRATE 2 MG/ML
1 SOLUTION/ DROPS OPHTHALMIC 2 TIMES DAILY
COMMUNITY

## 2022-12-27 RX ORDER — ONDANSETRON 2 MG/ML
4 INJECTION INTRAMUSCULAR; INTRAVENOUS ONCE
Status: COMPLETED | OUTPATIENT
Start: 2022-12-27 | End: 2022-12-27

## 2022-12-27 RX ORDER — ACETAMINOPHEN 325 MG/1
650 TABLET ORAL ONCE
Status: COMPLETED | OUTPATIENT
Start: 2022-12-27 | End: 2022-12-27

## 2022-12-27 RX ORDER — MAGNESIUM SULFATE 1 G/100ML
1000 INJECTION INTRAVENOUS
Status: COMPLETED | OUTPATIENT
Start: 2022-12-27 | End: 2022-12-27

## 2022-12-27 RX ORDER — SODIUM CHLORIDE 0.9 % (FLUSH) 0.9 %
10 SYRINGE (ML) INJECTION ONCE
Status: COMPLETED | OUTPATIENT
Start: 2022-12-27 | End: 2022-12-27

## 2022-12-27 RX ORDER — LEVOTHYROXINE SODIUM 0.1 MG/1
100 TABLET ORAL DAILY
COMMUNITY

## 2022-12-27 RX ADMIN — ONDANSETRON 4 MG: 2 INJECTION INTRAMUSCULAR; INTRAVENOUS at 18:42

## 2022-12-27 RX ADMIN — ASPIRIN 324 MG: 81 TABLET, CHEWABLE ORAL at 18:42

## 2022-12-27 RX ADMIN — SODIUM CHLORIDE, PRESERVATIVE FREE 10 ML: 5 INJECTION INTRAVENOUS at 17:05

## 2022-12-27 RX ADMIN — MAGNESIUM SULFATE HEPTAHYDRATE 1000 MG: 1 INJECTION, SOLUTION INTRAVENOUS at 18:39

## 2022-12-27 RX ADMIN — MAGNESIUM SULFATE HEPTAHYDRATE 1000 MG: 1 INJECTION, SOLUTION INTRAVENOUS at 20:33

## 2022-12-27 RX ADMIN — Medication 80 ML: at 17:05

## 2022-12-27 RX ADMIN — ACETAMINOPHEN 650 MG: 325 TABLET ORAL at 18:34

## 2022-12-27 RX ADMIN — IOPAMIDOL 75 ML: 755 INJECTION, SOLUTION INTRAVENOUS at 17:05

## 2022-12-27 ASSESSMENT — ENCOUNTER SYMPTOMS
EYE PAIN: 0
FACIAL SWELLING: 0
NAUSEA: 1
ABDOMINAL PAIN: 0
ABDOMINAL DISTENTION: 0
CHEST TIGHTNESS: 0
SHORTNESS OF BREATH: 0
BACK PAIN: 0
EYE DISCHARGE: 0

## 2022-12-27 NOTE — ED PROVIDER NOTES
EMERGENCY DEPARTMENT ENCOUNTER    Pt Name: Ludwig Henriquez  MRN: [id number]  [place] 1955  Date of evaluation: 12/27/22  CHIEF COMPLAINT       Chief Complaint   Patient presents with    Cerebrovascular Accident     HISTORY OF PRESENT ILLNESS   HPI  The patient is a 69-year-old female with a history of breast cancer hypertension and hyperlipidemia who presented to the emergency department secondary to for her right upper extremity weakness. Patient was at the grocery store when she had sudden onset of feeling nauseous unsteady with heaviness in her right arm. She denied slurred speech or syncopal episode. She has a previous history of stroke with right-sided residual deficits. She had a stroke  3 years ago. She takes Eliquis. REVIEW OF SYSTEMS     Review of Systems   Constitutional:  Negative for chills, diaphoresis and fever. HENT:  Negative for congestion, ear pain and facial swelling. Eyes:  Negative for pain, discharge and visual disturbance. Respiratory:  Negative for chest tightness and shortness of breath. Cardiovascular:  Negative for chest pain and palpitations. Gastrointestinal:  Positive for nausea. Negative for abdominal distention and abdominal pain. Genitourinary:  Negative for difficulty urinating and flank pain. Musculoskeletal:  Negative for back pain. Right arm weakness   Skin:  Negative for wound. Neurological:  Positive for weakness. Negative for dizziness, light-headedness and headaches.    PASTMEDICAL HISTORY     Past Medical History:   Diagnosis Date    Breast cancer ([address]     Depression     HTN (hypertension)     Hyperlipidemia     Ovarian cyst, bilateral     Pancreatitis     Renal stone      SURGICAL HISTORY       Past Surgical History:   Procedure Laterality Date    APPENDECTOMY      BREAST LUMPECTOMY      bilateral breasts    BREAST SURGERY      INGUINAL HERNIA REPAIR      LYMPHADENECTOMY Bilateral     TUNNELED VENOUS PORT PLACEMENT       CURRENT MEDICATIONS       Previous Medications    ACCU-CHEK MINDA PLUS STRIP    use 1 TEST STRIP to TEST BLOOD SUGAR three times a day    ALLOPURINOL (ZYLOPRIM) 100 MG TABLET    Take 100 mg by mouth daily    ASPIRIN 81 MG TABLET    Take 81 mg by mouth daily    B-D UF III MINI PEN NEEDLES 31G X 5 MM MISC    use 1 PEN NEEDLE to inject MEDICATION subcutaneously four times a day    BLOOD PRESSURE KIT    Check blood pressure daily. BRIMONIDINE (ALPHAGAN) 0.2 % OPHTHALMIC SOLUTION    Place 1 drop into the right eye in the morning and at bedtime    COLCHICINE (COLCRYS) 0.6 MG TABLET    Take 0.6 mg by mouth 2 times daily as needed (gout)    HUMALOG KWIKPEN 100 UNIT/ML SOPN    Inject 14 Units into the skin 3 times daily (before meals)    INSULIN DEGLUDEC (TRESIBA FLEXTOUCH) 200 UNIT/ML SOPN    Inject 72 Units into the skin daily    LANCET DEVICES (LANCING DEVICE) MISC    USE AS DIRECTED [facility] LANCET STRIP BEFORE TESTING BLOOD SUGAR    LEVOTHYROXINE (SYNTHROID) 100 MCG TABLET    Take 100 mcg by mouth Daily    LIPASE-PROTEASE-AMYLASE (CREON) 76985-17903 UNITS DELAYED RELEASE CAPSULE    Take 36,000 Units by mouth 3 times daily as needed    METOPROLOL TARTRATE (LOPRESSOR) 25 MG TABLET    Take 25 mg by mouth 2 times daily    MULTIPLE VITAMIN (MULTIVITAMIN) TABLET    Take 1 tablet by mouth daily. PANTOPRAZOLE (PROTONIX) 40 MG TABLET    Take 40 mg by mouth daily    PREGABALIN (LYRICA) 225 MG CAPSULE    Take 225 mg by mouth daily. RIVAROXABAN (XARELTO) 20 MG TABS TABLET    Take 20 mg by mouth    ROSUVASTATIN (CRESTOR) 40 MG TABLET    Take 40 mg by mouth every evening    VITAMIN D (CHOLECALCIFEROL) 25 MCG (1000 UT) TABS TABLET    Take 1,000 Units by mouth 2 times daily (with meals)     ALLERGIES     is allergic to doxycycline, reglan [metoclopramide], tramadol, compazine [prochlorperazine maleate], prochlorperazine edisylate, and vicodin [hydrocodone-acetaminophen].   FAMILY HISTORY     She indicated that the status of her mother is unknown. She indicated that the status of her father is unknown. She indicated that the status of her sister is unknown. SOCIAL HISTORY       Social History     Tobacco Use    Smoking status: Never    Smokeless tobacco: Never   Substance Use Topics    Alcohol use: Yes     Comment: rare, USED TO DRINK 2 BOTTLES OF WINE/WK QUIT LAST FRI    Drug use: No     PHYSICAL EXAM     INITIAL VITALS: BP (!) 134/96   Pulse 96   Temp 98.8 °F (37.1 °C) (Oral)   Resp 12   Ht 4' 11\" (1.499 m)   Wt 125 lb (56.7 kg)   SpO2 96%   BMI 25.25 kg/m²    Physical Exam  Vitals and nursing note reviewed. Constitutional:       General: She is not in acute distress. Appearance: She is well-developed. She is not diaphoretic. HENT:      Head: Normocephalic and atraumatic. Eyes:      Pupils: Pupils are equal, round, and reactive to light. Cardiovascular:      Rate and Rhythm: Normal rate and regular rhythm. Pulmonary:      Effort: Pulmonary effort is normal.      Breath sounds: Normal breath sounds. Abdominal:      General: Bowel sounds are normal.      Palpations: Abdomen is soft. Musculoskeletal:         General: Normal range of motion. Cervical back: Normal range of motion and neck supple. Skin:     General: Skin is warm. Capillary Refill: Capillary refill takes less than 2 seconds. Neurological:      Mental Status: She is alert and oriented to person, place, and time. MEDICAL DECISION MAKING:   Patient is a 56-year-old female who presented to the emergency department  secondary to right arm weakness nausea and vomiting. Symptoms began 1:00 PM, patient in the window for TN K therefore stroke alert was activated. Blood sugar 198. Stroke alert at 1538. Patient discussed with Dr. Paris Sotomayor for CTA to rule out LVO. CT negative. Laboratory notes reveal hypomagnesia in order for his replacement. Patient is able to pass a swallow evaluation and received aspirin as well as Tylenol.   Patient discussed with Dr. Black [facility] neurology service will follow patient on admission. The internal medicine service paged for admission    Shailesh Mar    Time and location patient examined: Room 19 at 1535    Date and time of last known well: 12/27/22 at 1300    3. Blood sugar: 198    4. NIHHS score: 2    5. Time of Stroke Alert: 7186    1. Tele-stroke provider: Dr. Marleni Berg    7. Risk/Benefit discussion if TNK indicated: NA    8. Reason(s) for any delay in TNK administration:NA    9. Reason(s) for not administering TNK: Patient iscurrently on Eliquis with a NIH of 2          HEART SCORE:                                                                                                                                                                                                                                                                                                                                                                                                                                                  All patient's question's and concerns were answered prior to disposition and patient and/or family expressed understanding and agreement of treatment plan. CRITICAL CARE:   CRITICAL CARE TIME     Due to the high probability of sudden and clinically significant deterioration in the patient's condition she required highest level of my preparedness to intervene urgently. I provided critical care time including documentation time, medication orders and management, reevaluation, vital sign assessment, ordering and reviewing of of lab tests ordering and reviewing of x-ray studies, and admission orders. Aggregate critical care time is between 45 minutes including only time during which I was engaged in work directly related to her care and did not include time spent treating other patients simultaneously.                NIH STROKE SCALE:  NIH Stroke Scale  Level of Consciousness (1a): Alert  LOC Questions (1b): Answers both correctly  LOC Commands (1c): Performs both tasks correctly  Best Gaze (2): Normal  Visual (3): No visual loss  Facial Palsy (4): Normal symmetrical movement  Motor Arm, Left (5a): No drift  Motor Arm, Right (5b): Drift, but does not hit bed  Motor Leg, Left (6a): No drift  Motor Leg, Right (6b): No drift  Limb Ataxia (7): Absent  Sensory (8): (!) Mild to Moderate  Best Language (9): No aphasia  Dysarthria (10): Normal  Extinction and Inattention (11): No abnormality  Total: 2         PROCEDURES:    Procedures    DIAGNOSTIC RESULTS   EKG:All EKG's are interpreted by the Emergency Department Physician who either signs or Co-signs this chart in the absence of a cardiologist.        RADIOLOGY:All plain film, CT, MRI, and formal ultrasound images (except ED bedside ultrasound) are read by the radiologist, see reports below, unless otherwisenoted in MDM or here. CTA HEAD NECK W CONTRAST   Final Result   1. No significant stenosis or occlusion of the cervical vasculature. 2. No large vessel occlusion intracranially. 3. 70% stenosis in the bilateral subclavian arteries. XR CHEST PORTABLE   Final Result   No acute cardiopulmonary disease         CT Head W/O Contrast   Final Result   Atrophy without acute intracranial abnormality. Remote bilateral parietal infarcts. Findings were sent to [facility] at 7:17 pm on 12/27/2022. MRI BRAIN WO CONTRAST    (Results Pending)     LABS: All lab results were reviewed by myself, and all abnormals are listed below.   Labs Reviewed   COMPREHENSIVE METABOLIC PANEL W/ REFLEX TO MG FOR LOW K - Abnormal; Notable for the following components:       Result Value    Glucose 243 (*)     Potassium 3.5 (*)     All other components within normal limits   PROTIME-INR - Abnormal; Notable for the following components:    Protime 16.9 (*)     All other components within normal limits   MAGNESIUM - Abnormal; Notable for the following components:    Magnesium 1.4 (*)     All other components within normal limits   POC GLUCOSE FINGERSTICK - Abnormal; Notable for the following components:    POC Glucose 193 (*)     All other components within normal limits   CBC WITH AUTO DIFFERENTIAL   TROPONIN   POCT GLUCOSE       EMERGENCY DEPARTMENTCOURSE:         Vitals:    Vitals:    12/27/22 1539   BP: (!) 134/96   Pulse: 96   Resp: 12   Temp: 98.8 °F (37.1 °C)   TempSrc: Oral   SpO2: 96%   Weight: 125 lb (56.7 kg)   Height: 4' 11\" (1.499 m)       The patient was given the following medications while in the emergency department:  Orders Placed This Encounter   Medications    ondansetron (ZOFRAN) injection 4 mg    0.9 % sodium chloride bolus    iopamidol (ISOVUE-370) 76 % injection 75 mL    sodium chloride flush 0.9 % injection 10 mL    aspirin chewable tablet 324 mg    acetaminophen (TYLENOL) tablet 650 mg    magnesium sulfate 1000 mg in dextrose 5% 100 mL IVPB     CONSULTS:  IP CONSULT TO PHARMACY  PHARMACY TO CHANGE BASE FLUIDS  IP CONSULT TO INTERNAL MEDICINE  IP CONSULT TO NEUROLOGY    FINAL IMPRESSION      1. Right arm weakness    Rule out TIA vs stroke      DISPOSITION/PLAN   DISPOSITION Decision To Admit 12/27/2022 06:23:18 PM      PATIENT REFERRED TO:  No follow-up provider specified. DISCHARGE MEDICATIONS:  New Prescriptions    No medications on file     Charan Stauffer MD  Attending Emergency Physician      The care is provided during an unprecedented national emergency due to the novel coronavirus, COVID 19. This note was created with the assistance of a speech-recognition program. While intending to generate a document that actually reflects the content of the visit, no guarantees can be provided that every mistake has been identified and corrected by editing.     Arline Obregon MD  91/[phone]/93 [phone]

## 2022-12-27 NOTE — ED NOTES
[facility]in[facility] Alert called @ [phone].      [facility]  12/27/22 [facility]  12/27/22 7739

## 2022-12-27 NOTE — PROGRESS NOTES
[address]     Stroke/STEMI/Arrest Alert Note    PATIENT NAME: Pedro Trinidad    Room # LPL54/01   Name: Pedro Trinidad            Age: 78 y.o. Gender: female          Uatsdin: None   Place of Confucianist:     Alert type: Stroke Alert    Admit Date & Time: 12/27/2022  3:31 PM    ADVANCE DIRECTIVES IN CHART? Yes    NAME OF DECISION MAKER: Spouse    RELATIONSHIP OF DECISION MAKER TO PATIENT: Spouse    PATIENT/EVENT DESCRIPTION:  Pedro Trinidad is a 78 y.o. female who arrived in ED with stroke like symptoms. Pt to be admitted to [facility].. SPIRITUAL ASSESSMENT/INTERVENTION:  Davidhelder Suazo offers active listening as patient reports events that led to ED visit. She also introduces her  at bedside. Patient reports that she has been through a stroke before and that she is hopeful that she will get through this one as well. She asks if  can get water for her  as he waits and  provides refreshment. PATIENT BELONGINGS:  With patient    ANY BELONGINGS OF SIGNIFICANT VALUE NOTED:  None    REGISTRATION STAFF NOTIFIED? No      WHAT IS YOUR SPIRITUAL CARE PLAN FOR THIS PATIENT?:   Provide spiritual support for patient and family during stay at [facility]). Electronically signed by Bradley Rios on 12/27/2022 at 6:05 PM.  Pamela  [phone]       12/27/22 1802   Encounter Summary   Service Provided For: Patient and family together   Referral/Consult From: Multi-disciplinary team   Support System Spouse   Last Encounter  12/27/22   Complexity of Encounter Moderate   Begin Time 1550   End Time  1610   Total Time Calculated 20 min   Encounter    Type Initial Screen/Assessment   Crisis   Type Code Stroke   Spiritual/Emotional needs   Type Spiritual Support   Assessment/Intervention/Outcome   Assessment Calm;Coping; Hopeful   Intervention Active listening;Explored/Affirmed feelings, thoughts, concerns;Nurtured Hope;Sustaining Presence/Ministry of presence   Outcome Comfort;Coping;Connection/Belonging;Receptive; Expressed feelings, needs, and concerns

## 2022-12-27 NOTE — PROGRESS NOTES
Transitions of Care Pharmacy Service   Medication Review    The patient's list of current home medications has been reviewed and updated. Source(s) of information: Patient, [facility]    Please feel free to call with any questions about this encounter. Thank you.     [facility]  Transitions of Care Pharmacy Service  Phone:  [phone]  Fax: [phone]

## 2022-12-27 NOTE — ED NOTES
Pt to er with c/o right arm heaviness. Pt states she was shopping and had episode of weakness and nausea. Pt states she felt like she had a \"jolt\" go through her right arm. Pt denies weakness to other limbs. Pt denies problems with speech. Pt states she has had previous stroke and has some deficit to right arm from the previous stroke. Pt a&ox3. Skin warm and dry. Speech clear. Pt answering questions appropriately and following commands.       Shruti Rivas RN  12/27/22 6040

## 2022-12-27 NOTE — ED NOTES
[facility] called @ [phone] to initiate case. .    Telestroke placed in room upon completing call.       Georgette Martinez  12/27/22 1121

## 2022-12-28 ENCOUNTER — APPOINTMENT (OUTPATIENT)
Dept: MRI IMAGING | Age: 67
DRG: 069 | End: 2022-12-28
Payer: MEDICARE

## 2022-12-28 LAB
ABSOLUTE EOS #: 0.29 K/UL (ref 0–0.44)
ABSOLUTE IMMATURE GRANULOCYTE: 0.01 K/UL (ref 0–0.3)
ABSOLUTE LYMPH #: 1.62 K/UL (ref 1.1–3.7)
ABSOLUTE MONO #: 0.38 K/UL (ref 0.1–1.2)
ANION GAP SERPL CALCULATED.3IONS-SCNC: 12 MMOL/L (ref 9–17)
BASOPHILS # BLD: 1 % (ref 0–2)
BASOPHILS ABSOLUTE: 0.03 K/UL (ref 0–0.2)
BUN BLDV-MCNC: 11 MG/DL (ref 8–23)
BUN/CREAT BLD: 16 (ref 9–20)
CALCIUM SERPL-MCNC: 9.3 MG/DL (ref 8.6–10.4)
CHLORIDE BLD-SCNC: 99 MMOL/L (ref 98–107)
CHOLESTEROL/HDL RATIO: 2.4
CHOLESTEROL: 94 MG/DL
CO2: 25 MMOL/L (ref 20–31)
CREAT SERPL-MCNC: 0.67 MG/DL (ref 0.5–0.9)
EKG ATRIAL RATE: 92 BPM
EKG P AXIS: 36 DEGREES
EKG P-R INTERVAL: 154 MS
EKG Q-T INTERVAL: 398 MS
EKG QRS DURATION: 86 MS
EKG QTC CALCULATION (BAZETT): 492 MS
EKG R AXIS: -39 DEGREES
EKG T AXIS: 51 DEGREES
EKG VENTRICULAR RATE: 92 BPM
EOSINOPHILS RELATIVE PERCENT: 6 % (ref 1–4)
ESTIMATED AVERAGE GLUCOSE: 252 MG/DL
GFR SERPL CREATININE-BSD FRML MDRD: >60 ML/MIN/1.73M2
GLUCOSE BLD-MCNC: 136 MG/DL (ref 65–105)
GLUCOSE BLD-MCNC: 160 MG/DL (ref 65–105)
GLUCOSE BLD-MCNC: 218 MG/DL (ref 65–105)
GLUCOSE BLD-MCNC: 232 MG/DL (ref 65–105)
GLUCOSE BLD-MCNC: 258 MG/DL (ref 70–99)
GLUCOSE BLD-MCNC: 266 MG/DL (ref 65–105)
GLUCOSE BLD-MCNC: 270 MG/DL (ref 65–105)
HBA1C MFR BLD: 10.4 % (ref 4–6)
HCT VFR BLD CALC: 38.4 % (ref 36.3–47.1)
HDLC SERPL-MCNC: 39 MG/DL
HEMOGLOBIN: 12.5 G/DL (ref 11.9–15.1)
IMMATURE GRANULOCYTES: 0 %
INR BLD: 1.1
LDL CHOLESTEROL: 29 MG/DL (ref 0–130)
LYMPHOCYTES # BLD: 35 % (ref 24–43)
MAGNESIUM: 1.8 MG/DL (ref 1.6–2.6)
MCH RBC QN AUTO: 31.1 PG (ref 25.2–33.5)
MCHC RBC AUTO-ENTMCNC: 32.6 G/DL (ref 28.4–34.8)
MCV RBC AUTO: 95.5 FL (ref 82.6–102.9)
MONOCYTES # BLD: 8 % (ref 3–12)
NRBC AUTOMATED: 0 PER 100 WBC
PDW BLD-RTO: 12.2 % (ref 11.8–14.4)
PLATELET # BLD: 222 K/UL (ref 138–453)
PMV BLD AUTO: 10.2 FL (ref 8.1–13.5)
POTASSIUM SERPL-SCNC: 3.4 MMOL/L (ref 3.7–5.3)
PROTHROMBIN TIME: 14.1 SEC (ref 11.5–14.2)
RBC # BLD: 4.02 M/UL (ref 3.95–5.11)
SEG NEUTROPHILS: 50 % (ref 36–65)
SEGMENTED NEUTROPHILS ABSOLUTE COUNT: 2.29 K/UL (ref 1.5–8.1)
SODIUM BLD-SCNC: 136 MMOL/L (ref 135–144)
TRIGL SERPL-MCNC: 132 MG/DL
WBC # BLD: 4.6 K/UL (ref 3.5–11.3)

## 2022-12-28 PROCEDURE — 83036 HEMOGLOBIN GLYCOSYLATED A1C: CPT

## 2022-12-28 PROCEDURE — 70551 MRI BRAIN STEM W/O DYE: CPT

## 2022-12-28 PROCEDURE — 80061 LIPID PANEL: CPT

## 2022-12-28 PROCEDURE — 97162 PT EVAL MOD COMPLEX 30 MIN: CPT

## 2022-12-28 PROCEDURE — 82947 ASSAY GLUCOSE BLOOD QUANT: CPT

## 2022-12-28 PROCEDURE — 97530 THERAPEUTIC ACTIVITIES: CPT

## 2022-12-28 PROCEDURE — 6370000000 HC RX 637 (ALT 250 FOR IP): Performed by: NURSE PRACTITIONER

## 2022-12-28 PROCEDURE — 2580000003 HC RX 258: Performed by: NURSE PRACTITIONER

## 2022-12-28 PROCEDURE — 6370000000 HC RX 637 (ALT 250 FOR IP): Performed by: FAMILY MEDICINE

## 2022-12-28 PROCEDURE — 99223 1ST HOSP IP/OBS HIGH 75: CPT | Performed by: PSYCHIATRY & NEUROLOGY

## 2022-12-28 PROCEDURE — 85025 COMPLETE CBC W/AUTO DIFF WBC: CPT

## 2022-12-28 PROCEDURE — 97166 OT EVAL MOD COMPLEX 45 MIN: CPT

## 2022-12-28 PROCEDURE — 85610 PROTHROMBIN TIME: CPT

## 2022-12-28 PROCEDURE — 80048 BASIC METABOLIC PNL TOTAL CA: CPT

## 2022-12-28 PROCEDURE — 1200000000 HC SEMI PRIVATE

## 2022-12-28 PROCEDURE — 36415 COLL VENOUS BLD VENIPUNCTURE: CPT

## 2022-12-28 PROCEDURE — 83735 ASSAY OF MAGNESIUM: CPT

## 2022-12-28 RX ORDER — SODIUM CHLORIDE 9 MG/ML
INJECTION, SOLUTION INTRAVENOUS PRN
Status: DISCONTINUED | OUTPATIENT
Start: 2022-12-28 | End: 2022-12-29 | Stop reason: HOSPADM

## 2022-12-28 RX ORDER — ONDANSETRON 2 MG/ML
4 INJECTION INTRAMUSCULAR; INTRAVENOUS EVERY 6 HOURS PRN
Status: DISCONTINUED | OUTPATIENT
Start: 2022-12-28 | End: 2022-12-29 | Stop reason: HOSPADM

## 2022-12-28 RX ORDER — INSULIN LISPRO 100 [IU]/ML
0-8 INJECTION, SOLUTION INTRAVENOUS; SUBCUTANEOUS EVERY 4 HOURS
Status: DISCONTINUED | OUTPATIENT
Start: 2022-12-28 | End: 2022-12-29 | Stop reason: HOSPADM

## 2022-12-28 RX ORDER — ALLOPURINOL 100 MG/1
100 TABLET ORAL DAILY
Status: DISCONTINUED | OUTPATIENT
Start: 2022-12-28 | End: 2022-12-29 | Stop reason: HOSPADM

## 2022-12-28 RX ORDER — INSULIN GLARGINE 100 [IU]/ML
58 INJECTION, SOLUTION SUBCUTANEOUS DAILY
Status: DISCONTINUED | OUTPATIENT
Start: 2022-12-28 | End: 2022-12-29 | Stop reason: HOSPADM

## 2022-12-28 RX ORDER — COLCHICINE 0.6 MG/1
0.6 TABLET ORAL 2 TIMES DAILY PRN
Status: DISCONTINUED | OUTPATIENT
Start: 2022-12-28 | End: 2022-12-29 | Stop reason: HOSPADM

## 2022-12-28 RX ORDER — FAMOTIDINE 20 MG/1
20 TABLET, FILM COATED ORAL DAILY
Status: DISCONTINUED | OUTPATIENT
Start: 2022-12-28 | End: 2022-12-29 | Stop reason: HOSPADM

## 2022-12-28 RX ORDER — ASPIRIN 81 MG/1
81 TABLET, CHEWABLE ORAL DAILY
Status: DISCONTINUED | OUTPATIENT
Start: 2022-12-28 | End: 2022-12-29 | Stop reason: HOSPADM

## 2022-12-28 RX ORDER — POTASSIUM CHLORIDE 20 MEQ/1
40 TABLET, EXTENDED RELEASE ORAL PRN
Status: DISCONTINUED | OUTPATIENT
Start: 2022-12-28 | End: 2022-12-29 | Stop reason: HOSPADM

## 2022-12-28 RX ORDER — DEXTROSE MONOHYDRATE 100 MG/ML
INJECTION, SOLUTION INTRAVENOUS CONTINUOUS PRN
Status: DISCONTINUED | OUTPATIENT
Start: 2022-12-28 | End: 2022-12-28 | Stop reason: SDUPTHER

## 2022-12-28 RX ORDER — SODIUM CHLORIDE 0.9 % (FLUSH) 0.9 %
10 SYRINGE (ML) INJECTION PRN
Status: DISCONTINUED | OUTPATIENT
Start: 2022-12-28 | End: 2022-12-29 | Stop reason: HOSPADM

## 2022-12-28 RX ORDER — ACETAMINOPHEN 650 MG/1
650 SUPPOSITORY RECTAL EVERY 6 HOURS PRN
Status: DISCONTINUED | OUTPATIENT
Start: 2022-12-28 | End: 2022-12-29 | Stop reason: HOSPADM

## 2022-12-28 RX ORDER — DEXTROSE MONOHYDRATE 100 MG/ML
INJECTION, SOLUTION INTRAVENOUS CONTINUOUS PRN
Status: DISCONTINUED | OUTPATIENT
Start: 2022-12-28 | End: 2022-12-29 | Stop reason: HOSPADM

## 2022-12-28 RX ORDER — PREGABALIN 75 MG/1
225 CAPSULE ORAL DAILY
Status: DISCONTINUED | OUTPATIENT
Start: 2022-12-28 | End: 2022-12-29 | Stop reason: HOSPADM

## 2022-12-28 RX ORDER — ONDANSETRON 4 MG/1
4 TABLET, ORALLY DISINTEGRATING ORAL EVERY 8 HOURS PRN
Status: DISCONTINUED | OUTPATIENT
Start: 2022-12-28 | End: 2022-12-29 | Stop reason: HOSPADM

## 2022-12-28 RX ORDER — INSULIN LISPRO 100 [IU]/ML
0-8 INJECTION, SOLUTION INTRAVENOUS; SUBCUTANEOUS
Status: DISCONTINUED | OUTPATIENT
Start: 2022-12-28 | End: 2022-12-28

## 2022-12-28 RX ORDER — MAGNESIUM SULFATE IN WATER 40 MG/ML
2000 INJECTION, SOLUTION INTRAVENOUS PRN
Status: DISCONTINUED | OUTPATIENT
Start: 2022-12-28 | End: 2022-12-29 | Stop reason: HOSPADM

## 2022-12-28 RX ORDER — SODIUM CHLORIDE 0.9 % (FLUSH) 0.9 %
5-40 SYRINGE (ML) INJECTION EVERY 12 HOURS SCHEDULED
Status: DISCONTINUED | OUTPATIENT
Start: 2022-12-28 | End: 2022-12-29 | Stop reason: HOSPADM

## 2022-12-28 RX ORDER — POTASSIUM CHLORIDE 7.45 MG/ML
10 INJECTION INTRAVENOUS PRN
Status: DISCONTINUED | OUTPATIENT
Start: 2022-12-28 | End: 2022-12-29 | Stop reason: HOSPADM

## 2022-12-28 RX ORDER — INSULIN LISPRO 100 [IU]/ML
0-4 INJECTION, SOLUTION INTRAVENOUS; SUBCUTANEOUS NIGHTLY
Status: DISCONTINUED | OUTPATIENT
Start: 2022-12-28 | End: 2022-12-29 | Stop reason: HOSPADM

## 2022-12-28 RX ORDER — MAGNESIUM SULFATE 1 G/100ML
1000 INJECTION INTRAVENOUS PRN
Status: DISCONTINUED | OUTPATIENT
Start: 2022-12-28 | End: 2022-12-28 | Stop reason: SDUPTHER

## 2022-12-28 RX ORDER — BRIMONIDINE TARTRATE 2 MG/ML
1 SOLUTION/ DROPS OPHTHALMIC 2 TIMES DAILY
Status: DISCONTINUED | OUTPATIENT
Start: 2022-12-28 | End: 2022-12-29 | Stop reason: HOSPADM

## 2022-12-28 RX ORDER — ACETAMINOPHEN 325 MG/1
650 TABLET ORAL EVERY 6 HOURS PRN
Status: DISCONTINUED | OUTPATIENT
Start: 2022-12-28 | End: 2022-12-29 | Stop reason: HOSPADM

## 2022-12-28 RX ORDER — LEVOTHYROXINE SODIUM 0.1 MG/1
100 TABLET ORAL DAILY
Status: DISCONTINUED | OUTPATIENT
Start: 2022-12-28 | End: 2022-12-29 | Stop reason: HOSPADM

## 2022-12-28 RX ORDER — ROSUVASTATIN CALCIUM 40 MG/1
40 TABLET, COATED ORAL EVERY EVENING
Status: DISCONTINUED | OUTPATIENT
Start: 2022-12-28 | End: 2022-12-29 | Stop reason: HOSPADM

## 2022-12-28 RX ADMIN — POTASSIUM CHLORIDE 40 MEQ: 1500 TABLET, EXTENDED RELEASE ORAL at 05:31

## 2022-12-28 RX ADMIN — ACETAMINOPHEN 650 MG: 325 TABLET ORAL at 12:19

## 2022-12-28 RX ADMIN — RIVAROXABAN 20 MG: 20 TABLET, FILM COATED ORAL at 17:41

## 2022-12-28 RX ADMIN — INSULIN LISPRO 4 UNITS: 100 INJECTION, SOLUTION INTRAVENOUS; SUBCUTANEOUS at 21:04

## 2022-12-28 RX ADMIN — BRIMONIDINE TARTRATE 1 DROP: 2 SOLUTION OPHTHALMIC at 21:08

## 2022-12-28 RX ADMIN — INSULIN GLARGINE 58 UNITS: 100 INJECTION, SOLUTION SUBCUTANEOUS at 08:44

## 2022-12-28 RX ADMIN — PANCRELIPASE LIPASE, PANCRELIPASE PROTEASE, PANCRELIPASE AMYLASE 20000 UNITS: 20000; 63000; 84000 CAPSULE, DELAYED RELEASE ORAL at 17:41

## 2022-12-28 RX ADMIN — INSULIN LISPRO 2 UNITS: 100 INJECTION, SOLUTION INTRAVENOUS; SUBCUTANEOUS at 17:43

## 2022-12-28 RX ADMIN — PANCRELIPASE LIPASE, PANCRELIPASE PROTEASE, PANCRELIPASE AMYLASE 15000 UNITS: 15000; 47000; 63000 CAPSULE, DELAYED RELEASE ORAL at 17:41

## 2022-12-28 RX ADMIN — ROSUVASTATIN 40 MG: 40 TABLET, FILM COATED ORAL at 17:41

## 2022-12-28 RX ADMIN — ASPIRIN 81 MG: 81 TABLET, CHEWABLE ORAL at 12:19

## 2022-12-28 RX ADMIN — PREGABALIN 225 MG: 75 CAPSULE ORAL at 12:18

## 2022-12-28 RX ADMIN — SODIUM CHLORIDE, PRESERVATIVE FREE 10 ML: 5 INJECTION INTRAVENOUS at 21:08

## 2022-12-28 RX ADMIN — METOPROLOL TARTRATE 25 MG: 25 TABLET, FILM COATED ORAL at 12:19

## 2022-12-28 RX ADMIN — ALLOPURINOL 100 MG: 100 TABLET ORAL at 12:18

## 2022-12-28 RX ADMIN — ONDANSETRON 4 MG: 4 TABLET, ORALLY DISINTEGRATING ORAL at 02:23

## 2022-12-28 RX ADMIN — SODIUM CHLORIDE, PRESERVATIVE FREE 10 ML: 5 INJECTION INTRAVENOUS at 09:00

## 2022-12-28 RX ADMIN — PANCRELIPASE LIPASE, PANCRELIPASE PROTEASE, PANCRELIPASE AMYLASE 20000 UNITS: 20000; 63000; 84000 CAPSULE, DELAYED RELEASE ORAL at 12:18

## 2022-12-28 RX ADMIN — INSULIN LISPRO 4 UNITS: 100 INJECTION, SOLUTION INTRAVENOUS; SUBCUTANEOUS at 02:22

## 2022-12-28 RX ADMIN — FAMOTIDINE 20 MG: 20 TABLET, FILM COATED ORAL at 08:44

## 2022-12-28 RX ADMIN — BRIMONIDINE TARTRATE 1 DROP: 2 SOLUTION OPHTHALMIC at 12:17

## 2022-12-28 RX ADMIN — PANCRELIPASE LIPASE, PANCRELIPASE PROTEASE, PANCRELIPASE AMYLASE 15000 UNITS: 15000; 47000; 63000 CAPSULE, DELAYED RELEASE ORAL at 12:18

## 2022-12-28 RX ADMIN — LEVOTHYROXINE SODIUM 100 MCG: 0.1 TABLET ORAL at 12:19

## 2022-12-28 RX ADMIN — INSULIN LISPRO 2 UNITS: 100 INJECTION, SOLUTION INTRAVENOUS; SUBCUTANEOUS at 12:21

## 2022-12-28 RX ADMIN — ACETAMINOPHEN 650 MG: 325 TABLET ORAL at 03:07

## 2022-12-28 ASSESSMENT — PAIN DESCRIPTION - DESCRIPTORS: DESCRIPTORS: ACHING

## 2022-12-28 ASSESSMENT — PAIN SCALES - GENERAL
PAINLEVEL_OUTOF10: 3
PAINLEVEL_OUTOF10: 6
PAINLEVEL_OUTOF10: 3

## 2022-12-28 ASSESSMENT — PAIN DESCRIPTION - LOCATION: LOCATION: HEAD

## 2022-12-28 NOTE — PLAN OF CARE
Problem: Chronic Conditions and Co-morbidities  Goal: Patient's chronic conditions and co-morbidity symptoms are monitored and maintained or improved  Outcome: Progressing  Flowsheets (Taken 12/28/2022 0200)  Care Plan - Patient's Chronic Conditions and Co-Morbidity Symptoms are Monitored and Maintained or Improved: Monitor and assess patient's chronic conditions and comorbid symptoms for stability, deterioration, or improvement     Problem: Discharge Planning  Goal: Discharge to home or other facility with appropriate resources  Outcome: Progressing  Flowsheets (Taken 12/28/2022 0200)  Discharge to home or other facility with appropriate resources:   Identify barriers to discharge with patient and caregiver   Arrange for needed discharge resources and transportation as appropriate   Identify discharge learning needs (meds, wound care, etc)     Problem: Safety - Adult  Goal: Free from fall injury  Outcome: Progressing

## 2022-12-28 NOTE — PROGRESS NOTES
Occupational Therapy  Facility/Department: [facility]  Occupational Therapy Initial Assessment    Name: Roberta Marion  : 1955  MRN: [id number]  Date of Service: 2022    RN reports patient is medically stable for therapy treatment this date. Chart reviewed prior to treatment and patient is agreeable for therapy. All lines intact and patient positioned comfortably at end of treatment. All patient needs addressed prior to ending therapy session. Patient Diagnosis(es): The encounter diagnosis was Right arm weakness. Past Medical History:  has a past medical history of Breast cancer ([facility] [address] Depression, HTN (hypertension), Hyperlipidemia, Ovarian cyst, bilateral, Pancreatitis, and Renal stone. Past Surgical History:  has a past surgical history that includes Inguinal hernia repair; Appendectomy; lymphadenectomy (Bilateral); Tunneled venous port placement; Breast lumpectomy; and Breast surgery. PER H&P: The patient is a 78-year-old female with a history of breast cancer hypertension and hyperlipidemia who presented to the emergency department secondary to for her right upper extremity weakness. Patient was at the grocery store when she had sudden onset of feeling nauseous unsteady with heaviness in her right arm. She denied slurred speech or syncopal episode. She has a previous history of stroke with right-sided residual deficits. She had a stroke  3 years ago. She takes Eliquis. Assessment   Performance deficits / Impairments: Decreased functional mobility ; Decreased ADL status; Decreased safe awareness;Decreased balance;Decreased posture;Decreased high-level IADLs;Decreased endurance;Decreased strength  Assessment: Skilled OT services are indiacted to increase I and safety during functional tasks to return [facility] at [facility] as able.   Prognosis: Good  Decision Making: Medium Complexity  Activity Tolerance  Activity Tolerance: Patient Tolerated treatment well;Patient limited by fatigue  Activity Tolerance Comments: fair        Plan   Occupational Therapy Plan  Times Per Week: 4-5x/wk 1x/day as joni  Current Treatment Recommendations: Strengthening, Balance training, Functional mobility training, Endurance training, Safety education & training, Pain management, Equipment evaluation, education, & procurement, Patient/Caregiver education & training, Self-Care / ADL, Home management training     Restrictions  Restrictions/Precautions  Restrictions/Precautions: General Precautions, Fall Risk  Required Braces or Orthoses?: No  Position Activity Restriction  Other position/activity restrictions: Up w/ assist, LUE IV    Subjective   General  Chart Reviewed: Yes  Patient assessed for rehabilitation services?: Yes  Family / Caregiver Present: Yes (Pts significant other in room during session)  Subjective  Subjective: Pt resting in bed, agreeable to OT cherry. Pt reporting her R UE feeling \"heavy\" during session     Social/Functional History  Social/Functional History  Lives With: Alone (Fiance lives in lower level of duplex)  Type of Home: House (second floor Duplex with full flight)  Home Layout: Laundry in basement  Home Access: Stairs to enter with rails  Entrance Stairs - Number of Steps: 12-15  Entrance Stairs - Rails: Right  Bathroom Shower/Tub: Tub/Shower unit  Bathroom Toilet: Standard  Bathroom Equipment: Grab bars in shower  Bathroom Accessibility: Accessible  Home Equipment: None  Has the patient had two or more falls in the past year or any fall with injury in the past year?: No  Receives Help From: Family, Neighbor  ADL Assistance: Independent  Homemaking Assistance: Independent  Homemaking Responsibilities: Yes  Ambulation Assistance: Independent  Transfer Assistance: Independent  Active : Yes  Mode of Transportation: Car  Occupation: Retired  Leisure & Hobbies: get together with girlfriends, go to the [employer], nature lover (pet squirrel, has cats and birds)       Objective Observation/Palpation  Posture: Fair  Edema: RUE  Safety Devices  Type of Devices: Bed alarm in place;Call light within reach; Left in bed;Gait belt;Nurse notified  Restraints  Restraints Initially in Place: No      Balance  Sitting:  (SBA)  Standing:  (CGA-Steffany with no AD)  Functional Mobility   Overall Level of Assistance: Minimum assistance;Contact-guard assistance (Pt completed functional mobility from bed to window and back x2 with no AD)  Interventions: Verbal cues; Tactile cues (Pt given Min verbal cues for pacing self, upright posture, and slowing down all to increase safety.)     AROM: Within functional limits  PROM: Within functional limits  Strength: Generally decreased, functional (BUE ~ 4/5)  Coordination: Within functional limits  Tone: Normal  Sensation: Impaired (Pt reports tingling in B hands and feet constant (report this is from Chemo). Pt also reporting \"heaviness\" in R UE)      ADL  Feeding: Setup  Grooming: Setup;Stand by assistance  UE Bathing: Setup;Stand by assistance  LE Bathing: Setup;Stand by assistance  UE Dressing: Setup;Stand by assistance  LE Dressing: Setup;Stand by assistance (for doffing/donning B socks while supine in bed.)  Toileting: Stand by assistance  Additional Comments: Pt given cues for pacing self during ADL tasks. Pt is limited by fatigue and decreased activity tolerance. Activity Tolerance  Activity Tolerance: Patient limited by pain  Activity Tolerance Comments: Pt reporting pain in RUE, RN notified. Bed mobility  Supine to Sit: Stand by assistance  Sit to Supine: Stand by assistance  Scooting: Stand by assistance  Bed Mobility Comments: Pt completed bed mobility this date without difficultites. Pt given Min verbal cues for pacing self and line mgmt all to increase safety/ease. Pt denied any dizziness once seated on EOB.       Transfers  Sit to stand: Contact guard assistance (No AD)  Stand to sit: Contact guard assistance  Transfer Comments: Pt completed STS transfers without significant difficulties this date. Pt given Min verbal cues for pacing self, upright posture, controlled stand to sit, and slowing down all to increase safety. Vision  Vision: Impaired (Uses glasses for driving)  Vision Exceptions: Wears glasses for distance  Hearing  Hearing: Within functional limits  Cognition  Overall Cognitive Status: [facility]  Safety Judgement: Decreased awareness of need for safety  Orientation  Overall Orientation Status: Within Functional Limits                  Education Given To: Patient  Education Provided: Role of Therapy;Transfer Training;Plan of Care;Energy Conservation; Fall Prevention Strategies; ADL Adaptive Strategies  Education Provided Comments: safety in function, fall prevention/call light use, OT POC, role of OT in acute care, benefits of being OOB, recommendations for continued therapy, EC/WS Tech  Education Method: Verbal  Barriers to Learning: None  Education Outcome: Verbalized understanding                                       AM-PAC Score        AM-PAC Inpatient Daily Activity Raw Score: 19 (12/28/22 1358)  AM-PAC Inpatient ADL T-Scale Score : 40.22 (12/28/22 1358)  ADL Inpatient CMS 0-100% Score: 42.8 (12/28/22 1358)  ADL Inpatient CMS G-Code Modifier : CK (12/28/22 1358)         Goals  Short Term Goals  Time Frame for Short Term Goals: By discharge, pt to demo  Short Term Goal 1: ADL transfers and functional mobility to Mod I with use of AD as needed. Short Term Goal 2: increased B UE strength by 1/2 grade to assist with funcitonal tasks/ I with B UE HEP with use of handouts as needed. Short Term Goal 3: toileting to Mod I with use of AD as needed. Short Term Goal 4: UB/LB ADLs to Mod I with use of AD/AE as needed. Short Term Goal 5: increased standing joni > 15 min with CGA to reduce risk of falls during funcitonal tasks.   Long Term Goals  Long Term Goal 1: Pt to be I with fall prevention education, EC/WS tech, disease specific/stroke education with use of handouts as needed. Patient Goals   Patient goals : To go home!        Therapy Time   Individual Concurrent Group Co-treatment   Time In 9056         Time Out 1025         Minutes 27          Tx time: 13 min        Chon Goins OT

## 2022-12-28 NOTE — VIRTUAL HEALTH
[address] Stroke and Vascular Neurology Consult for  [facility] Stroke Alert through [address] @ 15:47  12/27/2022 8:51 PM  Pt Name: Zoila Galvez  MRN: [id number]  [place]: 1955  Date of evaluation: 12/27/2022  Primary Care Physician: Pat Carter MD  Reason for Evaluation: Stroke Evaluation with Discussion with Ed or primary team with Telemedicine and stroke evaluation with Review of imaging and labs    Zoila Galvez is a 78 y.o. 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 She did have an evaluation feb 2021 with Dr. Gore Hearing cta neck showing residual 40% left command 60% right mid common carotid artery stenosis. While shopping today she had nausea and lightheadedness which was new and right arm heaviness and paresthesias without complete flaccidity she does have some residual minor fine motor difficulty of the right hand. No aphasia. No dysarthria. Nih 2    + headache this evening  LKW: 13:00  NIH:  2    Allergies  is allergic to doxycycline, reglan [metoclopramide], tramadol, compazine [prochlorperazine maleate], prochlorperazine edisylate, and vicodin [hydrocodone-acetaminophen]. Medications  Prior to Admission medications    Medication Sig Start Date End Date Taking?  Authorizing Provider   brimonidine (ALPHAGAN) 0.2 % ophthalmic solution Place 1 drop into the right eye in the morning and at bedtime   Yes Historical Provider, MD   metoprolol tartrate (LOPRESSOR) 25 MG tablet Take 25 mg by mouth 2 times daily   Yes Historical Provider, MD   levothyroxine (SYNTHROID) 100 MCG tablet Take 100 mcg by mouth Daily   Yes Historical Provider, MD   pantoprazole (PROTONIX) 40 MG tablet Take 40 mg by mouth daily    Historical Provider, MD   rosuvastatin (CRESTOR) 40 MG tablet Take 40 mg by mouth every evening    Historical Provider, MD   allopurinol (ZYLOPRIM) 100 MG tablet Take 100 mg by mouth daily    Historical Provider, MD   colchicine (COLCRYS) 0.6 MG tablet Take 0.6 mg by mouth 2 times daily as needed (gout)    Historical Provider, MD   rivaroxaban (XARELTO) 20 MG TABS tablet Take 20 mg by mouth    Historical Provider, MD   lipase-protease-amylase (CREON) 44035-95811 units delayed release capsule Take 36,000 Units by mouth 3 times daily as needed    Historical Provider, MD   pregabalin (LYRICA) 225 MG capsule Take 225 mg by mouth daily. 9/29/20   Historical Provider, MD CARRASQUILLO UF III MINI PEN NEEDLES 31G X 5 MM MISC use 1 PEN NEEDLE to inject MEDICATION subcutaneously four times a day 9/14/20   Historical Provider, MD   ACCU-CHEK MINDA PLUS strip use 1 TEST STRIP to TEST BLOOD SUGAR three times a day 8/15/20   Historical Provider, MD   Lancet Devices (LANCING DEVICE) MISC USE AS DIRECTED [facility] LANCET STRIP BEFORE TESTING BLOOD SUGAR 6/5/19   Historical Provider, MD   Insulin Degludec (TRESIBA FLEXTOUCH) 200 UNIT/ML SOPN Inject 72 Units into the skin daily 7/21/20   Historical Provider, MD   HUMALOG KWIKPEN 100 UNIT/ML SOPN Inject 14 Units into the skin 3 times daily (before meals) 10/12/20   Historical Provider, MD   Vitamin D (CHOLECALCIFEROL) 25 MCG (1000 UT) TABS tablet Take 1,000 Units by mouth 2 times daily (with meals) 3/24/20 3/24/21  Historical Provider, MD   aspirin 81 MG tablet Take 81 mg by mouth daily    Historical Provider, MD   Multiple Vitamin (MULTIVITAMIN) tablet Take 1 tablet by mouth daily. 12/12/14   Jen Lechuga MD   Blood Pressure KIT Check blood pressure daily.  3/13/14   Shawanda Garza MD    Scheduled Meds:   sodium chloride  80 mL IntraVENous Once    magnesium sulfate  1,000 mg IntraVENous Q1H     Continuous Infusions:  PRN Meds:.  Past Medical History   has a past medical history of Breast cancer ([facility] [address] Depression, HTN (hypertension), Hyperlipidemia, Ovarian cyst, bilateral, Pancreatitis, and Renal stone. Social History  Social History     Socioeconomic History    Marital status: Single     Spouse name: Not on file    Number of children: Not on file    Years of education: Not on file    Highest education level: Not on file   Occupational History     Employer: NONE   Tobacco Use    Smoking status: Never    Smokeless tobacco: Never   Substance and Sexual Activity    Alcohol use: Yes     Comment: rare, USED TO DRINK 2 BOTTLES OF WINE/WK QUIT LAST FRI    Drug use: No    Sexual activity: Yes     Partners: Male   Other Topics Concern    Not on file   Social History Narrative    ** Merged History Encounter **          Social Determinants of Health     Financial Resource Strain: Not on file   Food Insecurity: Not on file   Transportation Needs: Not on file   Physical Activity: Not on file   Stress: Not on file   Social Connections: Not on file   Intimate Partner Violence: Not on file   Housing Stability: Not on file     Family History      Problem Relation Age of Onset    Coronary Art Dis Mother     Heart Disease Mother     Coronary Art Dis Father     Diabetes Father     High Blood Pressure Father     Coronary Art Dis Sister        OBJECTIVE  BP (!) 134/96   Pulse 96   Temp 98.8 °F (37.1 °C) (Oral)   Resp 12   Ht 4' 11\" (1.499 m)   Wt 125 lb (56.7 kg)   SpO2 96%   BMI 25.25 kg/m²     NIH Stroke Scale  Level of Consciousness (1a): Alert  LOC Questions (1b): Answers both correctly  LOC Commands (1c): Performs both tasks correctly  Best Gaze (2): Normal  Visual (3): No visual loss  Facial Palsy (4): Normal symmetrical movement  Motor Arm, Left (5a): No drift  Motor Arm, Right (5b): Drift, but does not hit bed  Motor Leg, Left (6a): No drift  Motor Leg, Right (6b):  No drift  Limb Ataxia (7): Absent  Sensory (8): (!) Mild to Moderate  Best Language (9): No aphasia  Dysarthria (10): Normal  Extinction and Inattention (11): No abnormality  Total: 2  Pre-Morbid mRS: 1  GEn: Sitting in bed nad  CV:RRR  NEURO: alert and oriented x 3 intact language attention and knowledge  CN: eomi, perrl, v1-v3 intact no facial asymmetry, midline tongue  Motor 5/5 bue/ble, slightly decrease fine motor right hand  COORD: no dysmetria    Imaging:  Images were personally reviewed with VIZ. AI and PACS used to review images including:  CT brain without contrast: no hemorrhage  CTA imaging: bilateral mid common carotid artery stenosis. Noted bilateral subclavian stenosis    Assessment    78 y.o. 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 Differential DDx:  Stroke vs headache syndrome      Recommendations:  NIH 2  Recommend Inpatient Neurology Consult for further assessment and evaluation   Mri brain without chelo  No iv tnk as xarelto  Consider reevaluation if xarelto still needed may benefit from antiplatelets like aspirin and/or clopidogrel due to mid common carotid stenosis and subclavian stenosis in setting of loop recorder that has been neg for afib  Statins for ldl<70  A1c  Pt/ot        Discussed with ED Physician    At least 60 min of Telemedicine and time in conversation directly with ED staff and physician for the patient who is in imminent and life threatening deterioration without further treatment and evaluation. This Virtual Visit was conducted with patient's (and/or legal guardian's) consent, to provide telestroke consultation and necessary medical care.   Time spent examining patient, reviewing the images personally, reviewing the chart, perform high complexity decision making and speaking with the nursing staff regarding recommendations      Cash Nieto MD, MD   Stroke, Neurocritical Care And/or [address] [place] [address] Stroke [address]  Electronically signed 12/27/2022 at 8:51 PM    Kerri Imtiaz Piper, was evaluated through a synchronous (real-time) audio-video encounter. The patient (and/or guardian if applicable) is aware that this is a billable service, which includes applicable co-pays. This virtual visit was conducted with patient's (and/or legal guardian's) consent. Patient identification was verified, and a caregiver was present when appropriate. The patient was located at [facility] ([address] [place] [address]): [address] [facility]  [address] [facility]  Loc: [phone]  The provider was located at [facility] Dept): [facility] NEUROSCIENCE  [address] [facility], [address] [facility], [address] [place] [address]  [phone]     Total time spent on this encounter:  Rex Alcala MD on 12/27/2022 at 8:51 PM    An electronic signature was used to authenticate this note.

## 2022-12-28 NOTE — PROGRESS NOTES
Patient received from ED Vitals taken. Assessment completed. No distress noted. Call light within reach, and pt educated on its use. Bed in lowest position, and locked. Side rails up x 2. Denied further questions or needs at this time. Will continue to monitor.

## 2022-12-28 NOTE — PROGRESS NOTES
End Of Shift Note  [facility] CVICU  Summary of shift: Pt has RUE heaviness. Chronic numbness and tingling in all extremities pt stated. NIH score 0 this shift. Pt had headache rated 3/10 received tylenol and some nausea received zofran. Pt potassium replaced (see MAR).  Has MRI scheduled today, form is completed    Vitals:    Vitals:    12/28/22 0307 12/28/22 0400 12/28/22 0500 12/28/22 0600   BP:  130/85 (!) 121/94 97/70   Pulse: 69 70 79 72   Resp: 20 15 14 12   Temp:  97.2 °F (36.2 °C)     TempSrc:  Tympanic     SpO2:  97%     Weight:  121 lb (54.9 kg)     Height:            I&O: No intake or output data in the 24 hours ending 12/28/22 0638    Resp Status: RA    Ventilator Settings:     / / /     Critical Care IV infusions:   dextrose      sodium chloride          LDA:   Peripheral IV 12/27/22 Left;Upper Forearm (Active)   Number of days: 0

## 2022-12-28 NOTE — PROGRESS NOTES
Physical Therapy  Facility/Department: [facility]  Physical Therapy Initial Assessment    Name: Tiffani Trivedi  : 1955  MRN: [id number]  Date of Service: 2022  SANJEEV Aparicio reports patient is medically stable for therapy treatment this date. Chart reviewed prior to treatment and patient is agreeable for therapy. All lines intact and patient positioned comfortably at end of treatment. All patient needs addressed prior to ending therapy session. Patient Diagnosis(es): The encounter diagnosis was Right arm weakness. Past Medical History:  has a past medical history of Breast cancer ([facility] [address] Depression, HTN (hypertension), Hyperlipidemia, Ovarian cyst, bilateral, Pancreatitis, and Renal stone. Past Surgical History:  has a past surgical history that includes Inguinal hernia repair; Appendectomy; lymphadenectomy (Bilateral); Tunneled venous port placement; Breast lumpectomy; and Breast surgery. Per H&P:The patient is a 79-year-old female with a history of breast cancer hypertension and hyperlipidemia who presented to the emergency department secondary to for her right upper extremity weakness. Patient was at the grocery store when she had sudden onset of feeling nauseous unsteady with heaviness in her right arm. She denied slurred speech or syncopal episode. She has a previous history of stroke with right-sided residual deficits. She had a stroke  3 years ago. She takes Eliquis. Assessment   Body Structures, Functions, Activity Limitations Requiring Skilled Therapeutic Intervention: Decreased balance;Decreased endurance;Decreased safe awareness; Increased pain;Decreased strength  Assessment: Pt tolerated PT eval fair. Pt currently demonstrating fair steadiness throughout ambulation w/o AD. Pt presenting w/ deficits in balance, endurance, and strength.   Pt would benefit from continued skilled PT to address deficits in order to maximize independence w/ functional mobility and return to PLOF as able. Therapy Prognosis: Excellent  Decision Making: Medium Complexity  Requires PT Follow-Up: Yes  Activity Tolerance  Activity Tolerance: Patient limited by pain  Activity Tolerance Comments: Pt reporting pain in RUE, RN notified. Plan   Physcial Therapy Plan  General Plan: 5-7 times per week  Current Treatment Recommendations: Strengthening, Balance training, Functional mobility training, Stair training, Gait training, Endurance training, Pain management, Home exercise program, Patient/Caregiver education & training, Safety education & training, Therapeutic activities  Safety Devices  Type of Devices: Bed alarm in place, Call light within reach, Left in bed, Gait belt, Nurse notified  Restraints  Restraints Initially in Place: No     Restrictions  Restrictions/Precautions  Restrictions/Precautions: General Precautions, Fall Risk  Required Braces or Orthoses?: No  Position Activity Restriction  Other position/activity restrictions: Up w/ assist, LUE IV     Subjective   General  Patient assessed for rehabilitation services?: Yes  Response To Previous Treatment: Not applicable  Family / Caregiver Present: Yes (Pt's significant other present at bedside throughout PT eval.)  Follows Commands: Within Functional Limits  General Comment  Comments: RN and pt agreeable to therapy. Pt supine in bed upon arrival.  Pt pleasant and cooperative throughout. Subjective  Subjective: Pt reporting feeling \"okay\" at time of PT eval, hopeful she will go home today if they do the MRI.          Social/Functional History  Social/Functional History  Lives With: Alone (Fiance lives in lower level of duplex)  Type of Home: House (second floor Duplex with full flight)  Home Layout: Laundry in basement  Home Access: Stairs to enter with rails  Entrance Stairs - Number of Steps: 12-15  Entrance Stairs - Rails: Right  Bathroom Shower/Tub: Tub/Shower unit  Bathroom Toilet: Standard  Bathroom Equipment: Grab bars in [address] Accessibility: Accessible  Home Equipment: None  Has the patient had two or more falls in the past year or any fall with injury in the past year?: No  Receives Help From: Family, Neighbor  ADL Assistance: [facility] [address] [facility] [address]: Independent  Homemaking Responsibilities: Yes  Ambulation Assistance: Independent  Transfer Assistance: Independent  Active : Yes  Mode of Transportation: Car  Occupation: Retired  Leisure & Hobbies: get together with girlfriends, go to the movies, nature lover (pet squirrel, has cats and birds)  Vision/Hearing  Vision  Vision: Impaired (Uses glasses for driving)  Vision Exceptions: Wears glasses for distance  Hearing  Hearing: Within functional limits    Cognition   Orientation  Overall Orientation Status: Within Functional Limits  Cognition  Overall Cognitive Status: WFL  Safety Judgement: Decreased awareness of need for safety     Objective   Observation/Palpation  Posture: Fair  Edema: RUE  Gross Assessment  Sensation: Impaired (Pt reports chronic numbness/tingling in B hands and feet)     AROM RLE (degrees)  RLE AROM: WFL  AROM LLE (degrees)  LLE AROM : WFL  AROM RUE (degrees)  RUE AROM : WFL  AROM LUE (degrees)  LUE AROM : WFL  Strength RLE  Strength RLE: WFL  Comment: Grossly 4-/5  Strength LLE  Strength LLE: WFL  Comment: Grossly 4-/5  Strength RUE  Comment: See OT assessment for detail  Strength LUE  Comment: See OT assessment for detail          Bed mobility  Supine to Sit: Stand by assistance  Sit to Supine: Stand by assistance  Scooting: Stand by assistance  Bed Mobility Comments: Pt w/o difficulty throughout bed mobility this date. Pt requiring min verbal cueing for pacing and general safety awareness throughout. Pt denying any dizziness/lightheadedness throughout position changes. Assist required for safe line mgmt throughout.     Transfers  Sit to Stand: Contact guard assistance  Stand to Sit: Contact guard assistance  Comment: Pt demonstrating fair steadiness throughout STS transfers this date. Pt denying any dizziness/lightheadedness throughout position changes. While standing at EOB, pt performed pre-gait standing marches to assess stability prior to initiating ambulation w/ fair steadiness noted throughout. Pt also demonstrating fair eccentric quad control throughout stand>sit transfers w/ min verbal cueing. Ambulation  Surface: Level tile  Device: No Device  Assistance: Contact guard assistance;Minimal assistance  Quality of Gait: fair steadiness, slow pace  Gait Deviations: Slow Elda;Decreased step length;Decreased step height;Decreased arm swing;Decreased head and trunk rotation  Distance: 20ft  Comments: Pt ambulating within room demonstrating fair steadiness throughout. Pt ambulating w/ slow pace and appearing generally guarded throughout w/ decreased trunk movement and decreased step length throughout. Assist required for safe line mgmt throughout. More Ambulation?: No  Stairs/Curb  Stairs?: No     Balance  Posture: Fair  Sitting - Static: Good  Sitting - Dynamic: Good  Standing - Static: Fair;+  Standing - Dynamic: Fair;-  Single Leg Stance R Le  Single Leg Stance L Le  Comments: Standing balance assessed w/o AD  Romberg/Narrow Base of Support  Eyes Open: 10 seconds  Sway: None  Eyes Closed: 10 seconds  Sway: Minimal             AM-PAC Score  AM-PAC Inpatient Mobility Raw Score : 20 (22 1320)  AM-PAC Inpatient T-Scale Score : 47.67 (22 1320)  Mobility Inpatient CMS 0-100% Score: 35.83 (22 1320)  Mobility Inpatient CMS G-Code Modifier : CJ (22 1320)          Functional Outcome Measure-   Single Leg Stance Test:  2 sec.  (<5 sec.= fall risk)    Goals  Short Term Goals  Time Frame for Short Term Goals: 8 visits  Short Term Goal 1: Pt to demonstrate bed mobility independently  Short Term Goal 2: Pt to perform STS transfers w/o AD independently  Short Term Goal 3: Pt to ambulate at least 150ft w/o AD independently  Short Term Goal 4: Pt to ascend/descend 15 stairs w/ handrails SBA  Short Term Goal 5: Pt to actively participate in at least 30 minutes of physical therapy for ther act, ther ex, balance, gait, and endurance training  Patient Goals   Patient Goals : To go home       Education  Patient Education  Education Given To: Patient  Education Provided: Role of Therapy;Plan of Care; Fall Prevention Strategies  Education Provided Comments: Pt educated on: purpose of acute PT eval, importance of continued mobility throughout admission, general safety awareness, fall risk prevention, and PT POC. Pt w/ fair return demo. Pt would benefit from continued reinforcement of education.   Education Method: Verbal  Education Outcome: Verbalized understanding;Continued education needed      Therapy Time   Individual Concurrent Group Co-treatment   Time In 3970         Time Out 1006         Minutes 19+10= 29 total          Additional 10 minutes added for chart review and RN communication   Treatment time: 10 minutes       Miguel Nance PT

## 2022-12-28 NOTE — H&P
History & Physical  [facility]    Adult Hospitalist      Name: Marivel Warner  MRN: [id number]     [place]: [de-identified]  Room: 2042/2042-01    Admit Date: 12/27/2022  3:31 PM  PCP: Laurie Nichols MD    Primary Problem  Principal Problem:    Stroke-like symptom  Resolved Problems:    * No resolved hospital problems. *        Assesment:     Right-sided weakness-rule out TIA  History of CVA with residual right-sided weakness 3 years ago  Hypertension  Hyperlipidemia  History of breast cancer  Major depression        Plan:     Admit to [facility] with telemetry  O2 maintain oxygen saturation greater than 92%    Neurochecks per protocol  MRI brain-unremarkable  Echocardiogram  Lipid profile  Hemoglobin A1c  Neurology consult    Continue to monitor/telemetry/CBC with differential daily/BMP daily  DVT and GI prophylaxis.   Continue medications as below    Discharge planning      Scheduled Meds:   sodium chloride flush  5-40 mL IntraVENous 2 times per day    famotidine  20 mg Oral Daily    insulin lispro  0-8 Units SubCUTAneous Q4H    insulin glargine  58 Units SubCUTAneous Daily    allopurinol  100 mg Oral Daily    aspirin  81 mg Oral Daily    brimonidine  1 drop Right Eye BID    levothyroxine  100 mcg Oral Daily    lipase-protease-amylas  15,000 Units Oral TID WC    Pancrelipase (Lip-Prot-Amyl)  20,000 Units Oral TID WC    metoprolol tartrate  25 mg Oral BID    pregabalin  225 mg Oral Daily    rivaroxaban  20 mg Oral Daily    rosuvastatin  40 mg Oral QPM    insulin lispro  0-4 Units SubCUTAneous Nightly    sodium chloride  80 mL IntraVENous Once     Continuous Infusions:   dextrose      sodium chloride       PRN Meds:  glucose, 4 tablet, PRN  dextrose bolus, 125 mL, PRN   Or  dextrose bolus, 250 mL, PRN  glucagon (rDNA), 1 mg, PRN  dextrose, , Continuous PRN  sodium chloride flush, 10 mL, PRN  sodium chloride, , PRN  potassium chloride, 40 mEq, PRN   Or  potassium alternative oral replacement, 40 mEq, PRN Or  potassium chloride, 10 mEq, PRN  ondansetron, 4 mg, Q8H PRN   Or  ondansetron, 4 mg, Q6H PRN  magnesium hydroxide, 30 mL, Daily PRN  acetaminophen, 650 mg, Q6H PRN   Or  acetaminophen, 650 mg, Q6H PRN  magnesium sulfate, 2,000 mg, PRN  colchicine, 0.6 mg, BID PRN      Chief Complaint:     Chief Complaint   Patient presents with    Cerebrovascular Accident         History of Present Illness:      Hailee Jackson is a 78 y.o.  female who presents with Cerebrovascular Accident    72-year-old-year-old lady with past medical history of hypertension, hyperlipidemia, breast cancer, depression presented to ER complaining of right upper extremity weakness. Per her description she was at the grocery store where she had sudden onset of feeling nauseous, unsteady with heaviness in her right arm. Denies any changes in her speech, vision or syncope. Patient has a previous history of stroke with right-sided residual weakness 3 years ago. Patient is on Eliquis. Patient denies any chest pain, cough, cold, change urination, elevated rash. On presentation potassium was 3.4. CT brain showed remote bilateral parietal infarcts. CTA head and neck shows 70% stenosis in the bilateral subclavian arteries    I have personally reviewed the past medical history, past surgical history, medications, social history, and family history, and summarized in the note. Review of Systems:     All 10 point system is reviewed and negative otherwise mentioned in HPI.       Past Medical History:     Past Medical History:   Diagnosis Date    Breast cancer ([address]     Depression     HTN (hypertension)     Hyperlipidemia     Ovarian cyst, bilateral     Pancreatitis     Renal stone         Past Surgical History:     Past Surgical History:   Procedure Laterality Date    APPENDECTOMY      BREAST LUMPECTOMY      bilateral breasts    BREAST SURGERY      INGUINAL HERNIA REPAIR      LYMPHADENECTOMY Bilateral     TUNNELED VENOUS PORT PLACEMENT Medications Prior to Admission:       Prior to Admission medications    Medication Sig Start Date End Date Taking? Authorizing Provider   brimonidine (ALPHAGAN) 0.2 % ophthalmic solution Place 1 drop into the right eye in the morning and at bedtime   Yes Historical Provider, MD   metoprolol tartrate (LOPRESSOR) 25 MG tablet Take 25 mg by mouth 2 times daily   Yes Historical Provider, MD   levothyroxine (SYNTHROID) 100 MCG tablet Take 100 mcg by mouth Daily   Yes Historical Provider, MD   pantoprazole (PROTONIX) 40 MG tablet Take 40 mg by mouth daily    Historical Provider, MD   rosuvastatin (CRESTOR) 40 MG tablet Take 40 mg by mouth every evening    Historical Provider, MD   allopurinol (ZYLOPRIM) 100 MG tablet Take 100 mg by mouth daily    Historical Provider, MD   colchicine (COLCRYS) 0.6 MG tablet Take 0.6 mg by mouth 2 times daily as needed (gout)    Historical Provider, MD   rivaroxaban (XARELTO) 20 MG TABS tablet Take 20 mg by mouth    Historical Provider, MD   lipase-protease-amylase (CREON) 94105-61957 units delayed release capsule Take 36,000 Units by mouth 3 times daily as needed    Historical Provider, MD   pregabalin (LYRICA) 225 MG capsule Take 225 mg by mouth daily.  9/29/20   Historical Provider, MD CARRASQUILLO UF III MINI PEN NEEDLES 31G X 5 MM MISC use 1 PEN NEEDLE to inject MEDICATION subcutaneously four times a day 9/14/20   Historical Provider, MD   ACCU-CHEK MINDA PLUS strip use 1 TEST STRIP to TEST BLOOD SUGAR three times a day 8/15/20   Historical Provider, MD   Lancet Devices (LANCING DEVICE) MISC USE AS DIRECTED [facility] LANCET STRIP BEFORE TESTING BLOOD SUGAR 6/5/19   Historical Provider, MD   Insulin Degludec (TRESIBA FLEXTOUCH) 200 UNIT/ML SOPN Inject 72 Units into the skin daily 7/21/20   Historical Provider, MD   HUMALOG KWIKPEN 100 UNIT/ML SOPN Inject 14 Units into the skin 3 times daily (before meals) 10/12/20   Historical Provider, MD   Vitamin D (CHOLECALCIFEROL) 25 MCG (1000 UT) TABS tablet Take 1,000 Units by mouth 2 times daily (with meals) 3/24/20 3/24/21  Historical Provider, MD   aspirin 81 MG tablet Take 81 mg by mouth daily    Historical Provider, MD   Multiple Vitamin (MULTIVITAMIN) tablet Take 1 tablet by mouth daily. 14   Diony Ramos MD   Blood Pressure KIT Check blood pressure daily. 3/13/14   Shawanda Bonilla MD        Allergies:       Doxycycline, Reglan [metoclopramide], Tramadol, Compazine [prochlorperazine maleate], Prochlorperazine edisylate, and Vicodin [hydrocodone-acetaminophen]    Social History:     Tobacco:    reports that she has never smoked. She has never used smokeless tobacco.  Alcohol:      reports current alcohol use. Drug Use:  reports no history of drug use.     Family History:     Family History   Problem Relation Age of Onset    Coronary Art Dis Mother     Heart Disease Mother     Coronary Art Dis Father     Diabetes Father     High Blood Pressure Father     Coronary Art Dis Sister          Physical Exam:     Vitals:  /87   Pulse 80   Temp 97.3 °F (36.3 °C) (Temporal)   Resp 12   Ht 4' 11\" (1.499 m)   Wt 121 lb (54.9 kg)   SpO2 96%   BMI 24.44 kg/m²   Temp (24hrs), Av.1 °F (36.2 °C), Min:96.8 °F (36 °C), Max:97.3 °F (36.3 °C)          General appearance - alert, well appearing, and in no acute distress  Mental status - oriented to person, place, and time with normal affect  Head - normocephalic and atraumatic  Eyes - pupils equal and reactive, extraocular eye movements intact, conjunctiva clear  Ears - hearing appears to be intact  Nose - no drainage noted  Mouth - mucous membranes moist  Neck - supple, no carotid bruits, thyroid not palpable  Chest - clear to auscultation, normal effort  Heart - normal rate, regular rhythm, no murmur  Abdomen - soft, nontender, nondistended, bowel sounds present all four quadrants, no masses, hepatomegaly or splenomegaly  Neurological - normal speech, right-sided weakness   extremities - peripheral pulses palpable, no pedal edema or calf pain with palpation  Skin - no gross lesions, rashes, or induration noted        Data:     Labs:    Hematology:  Recent Labs     12/27/22  1617 12/28/22  0318   WBC 5.7 4.6   RBC 4.33 4.02   HGB 13.5 12.5   HCT 40.8 38.4   MCV 94.2 95.5   MCH 31.2 31.1   MCHC 33.1 32.6   RDW 12.2 12.2    222   MPV 10.6 10.2   INR 1.4 1.1     Chemistry:  Recent Labs     12/27/22  1617 12/28/22  0318    136   K 3.5* 3.4*    99   CO2 26 25   GLUCOSE 243* 258*   BUN 12 11   CREATININE 0.72 0.67   MG 1.4* 1.8   ANIONGAP 15 12   LABGLOM >60 >60   CALCIUM 9.9 9.3   TROPHS 7  --      Recent Labs     12/27/22  1537 12/27/22  1617 12/28/22  0218 12/28/22  0533 12/28/22  0737 12/28/22  1155   PROT  --  7.7  --   --   --   --    LABALBU  --  4.3  --   --   --   --    AST  --  22  --   --   --   --    ALT  --  22  --   --   --   --    ALKPHOS  --  77  --   --   --   --    BILITOT  --  0.5  --   --   --   --    POCGLU 193*  --  266* 160* 136* 232*       Lab Results   Component Value Date    INR 1.1 12/28/2022    INR 1.4 12/27/2022    INR 0.9 09/16/2020    PROTIME 14.1 12/28/2022    PROTIME 16.9 (H) 12/27/2022    PROTIME 9.4 09/16/2020       Lab Results   Component Value Date/Time    SPECIAL NOT REPORTED 04/17/2012 02:00 PM     Lab Results   Component Value Date/Time    CULTURE KLEBSIELLA PNEUMONIAE >523370 CFU/ML (A) 04/17/2012 02:00 PM    CULTURE  04/17/2012 02:00 PM     [employer] [address] [facility] 3 [phone]       Lab Results   Component Value Date/Time    FIO2 NOT REPORTED 09/16/2020 08:21 PM       Radiology:    CT Head W/O Contrast    Result Date: 12/27/2022  Atrophy without acute intracranial abnormality. Remote bilateral parietal infarcts. Findings were sent to [facility] at 4:06 pm on 12/27/2022. XR CHEST PORTABLE    Result Date: 12/27/2022  No acute cardiopulmonary disease     CTA HEAD NECK W CONTRAST    Result Date: 12/27/2022  1.  No significant stenosis or occlusion of the cervical vasculature. 2. No large vessel occlusion intracranially. 3. 70% stenosis in the bilateral subclavian arteries. MRI BRAIN WO CONTRAST    Result Date: 12/28/2022  No acute ischemia. Bilateral parietal lobe encephalomalacia greater on the right. Mild chronic microvascular disease within the periventricular white matter. All radiological studies reviewed                Code Status:  Full Code    Electronically signed by Michael Charles MD on 12/28/2022 at 3:57 PM     Copy sent to Dr. Jose Carlos Flores MD    This note was created with the assistance of a speech-recognition program.  Although the intention is to generate a document that actually reflects the content of the visit, no guarantees can be provided that every mistake has been identified and corrected by editing. Note was updated later by me after  physical examination and  completion of the assessment.

## 2022-12-28 NOTE — CONSULTS
NEUROLOGY CONSULT NOTE      Requesting Physician: Sherlyn Gaona MD    Reason for Consult:  Evaluate for strokelike symptoms. History of Present Illness:  Elizabeth Marie is a 78 y.o. female  with h/o HLD, HTN, breast cancer, and pancreatitis who was admitted to [address] [place] [address] on 12/27/2022 with presentation of weakness. Patient reports that she was very shopping when she noted a jolt go through her right arm associated with right arm weakness and nausea and unsteadiness. She indicated history of prior CVA 3 years ago with associated right arm deficits with some residual.  She denies any specific precipitating event or factors. She is currently on Eliquis therapy. Telestroke consult in ED: Dr. Don Breaux: 2  CT head: No acute intracranial abnormalities with remote bilateral parietal infarcts. CTA head/neck: No LVO; no significant intracranial stenosis; 70% stenosis bilateral subclavian arteries. Thrombolytic therapy: Not applicable since patient on anticoagulation at time of presentation with low NIHSS. MRI brain: 12/28/2022 study showing no acute ischemia with bilateral parietal lobe encephalomalacia greater on the right from prior stroke.     Past Medical History:        Diagnosis Date    Breast cancer ([address]     Depression     HTN (hypertension)     Hyperlipidemia     Ovarian cyst, bilateral     Pancreatitis     Renal stone            Procedure Laterality Date    APPENDECTOMY      BREAST LUMPECTOMY      bilateral breasts    BREAST SURGERY      INGUINAL HERNIA REPAIR      LYMPHADENECTOMY Bilateral     TUNNELED VENOUS PORT PLACEMENT         Social History:  Social History     Tobacco Use   Smoking Status Never   Smokeless Tobacco Never     Social History     Substance and Sexual Activity   Alcohol Use Yes    Comment: rare, USED TO DRINK 2 BOTTLES OF WINE/WK QUIT LAST FRI     Social History     Substance and Sexual Activity   Drug Use No     Single    Family History:       Problem Relation Age of Onset    Coronary Art Dis Mother     Heart Disease Mother     Coronary Art Dis Father     Diabetes Father     High Blood Pressure Father     Coronary Art Dis Sister        Review of Systems:  Constitutional:  Negative for chills, diaphoresis and fever. HENT:  Negative for congestion, ear pain and facial swelling. Eyes:  Negative for pain, discharge and visual disturbance. Respiratory:  Negative for chest tightness and shortness of breath. Cardiovascular:  Negative for chest pain and palpitations. Gastrointestinal:  Positive for nausea. Negative for abdominal distention and abdominal pain. Genitourinary:  Negative for difficulty urinating and flank pain. Musculoskeletal:  Negative for back pain. Right arm weakness   Skin:  Negative for wound. Neurological:  Positive for weakness. Negative for dizziness, light-headedness and headaches. Allergies:     Allergies   Allergen Reactions    Doxycycline      Other reaction(s): Vomiting    Reglan [Metoclopramide]      Made her feel like she wanted to climb out of her skin    Tramadol Nausea And Vomiting    Compazine [Prochlorperazine Maleate]     Prochlorperazine Edisylate     Vicodin [Hydrocodone-Acetaminophen]         Current Medications:   glucose chewable tablet 16 g, PRN  dextrose bolus 10% 125 mL, PRN   Or  dextrose bolus 10% 250 mL, PRN  glucagon (rDNA) injection 1 mg, PRN  dextrose 10 % infusion, Continuous PRN  sodium chloride flush 0.9 % injection 5-40 mL, 2 times per day  sodium chloride flush 0.9 % injection 10 mL, PRN  0.9 % sodium chloride infusion, PRN  potassium chloride (KLOR-CON M) extended release tablet 40 mEq, PRN   Or  potassium bicarb-citric acid (EFFER-K) effervescent tablet 40 mEq, PRN   Or  potassium chloride 10 mEq/100 mL IVPB (Peripheral Line), PRN  ondansetron (ZOFRAN-ODT) disintegrating tablet 4 mg, Q8H PRN   Or  ondansetron (ZOFRAN) injection 4 mg, Q6H PRN  magnesium hydroxide (MILK OF MAGNESIA) 400 MG/5ML suspension 30 mL, Daily PRN  famotidine (PEPCID) tablet 20 mg, Daily  acetaminophen (TYLENOL) tablet 650 mg, Q6H PRN   Or  acetaminophen (TYLENOL) suppository 650 mg, Q6H PRN  insulin lispro (HUMALOG) injection vial 0-8 Units, Q4H  insulin glargine (LANTUS) injection vial 58 Units, Daily  magnesium sulfate 2000 mg in 50 mL IVPB premix, PRN  allopurinol (ZYLOPRIM) tablet 100 mg, Daily  aspirin chewable tablet 81 mg, Daily  brimonidine (ALPHAGAN) 0.2 % ophthalmic solution 1 drop, BID  colchicine (COLCRYS) tablet 0.6 mg, BID PRN  levothyroxine (SYNTHROID) tablet 100 mcg, Daily  lipase-protease-amylas (ZENPEP 15,000) delayed release capsule 15,000 Units, TID WC  Pancrelipase (Lip-Prot-Amyl) (ZENPEP) 60804-04283 units delayed release capsule 20,000 Units, TID WC  metoprolol tartrate (LOPRESSOR) tablet 25 mg, BID  pregabalin (LYRICA) capsule 225 mg, Daily  rivaroxaban (XARELTO) tablet 20 mg, Daily  rosuvastatin (CRESTOR) tablet 40 mg, QPM  insulin lispro (HUMALOG) injection vial 0-4 Units, Nightly  0.9 % sodium chloride bolus, Once         Physical Exam:  /87   Pulse 80   Temp 97.3 °F (36.3 °C) (Temporal)   Resp 12   Ht 4' 11\" (1.499 m)   Wt 121 lb (54.9 kg)   SpO2 96%   BMI 24.44 kg/m²  I Body mass index is 24.44 kg/m². I   Wt Readings from Last 1 Encounters:   12/28/22 121 lb (54.9 kg)          HEENT: Normocephalic, atraumatic, no lesions or abnormalities noted. Neck:  supple with full ROM; no masses, nodes or bruits; no cervical tenderness on palpation. Lungs:  clear to auscultation  bilaterally     CV: RRR without gallops or murmurs     Extremities: no c/c; lymphedema in left arm. Neurologic Exam   Mental Status:  Patient was alert, responsive, oriented, appropriate, answering questions, and following commands. Speech was fluent with normal sensorium and cognition.    Cranial Nerves: Pupils were equal round and reactive to light;    Visual fields were full on confrontation;  Funduscopic exam was normal; no pappilledema   Extraocular movements were intact; no nystagmus; Intact facial sensation;    Symmetric facial movements with good lip and eye closure bilaterally; Hearing was intact; Mccloud was midline;    Normal palatal elevation with midline uvula  Trapezius strength of 5/5. Tongue was midline with no atrophy or fasciculations  Motor Exam:  Strength was 5/5 throughout; normal tone and bulk; no atrophy or fasiculations noted. Sensory Exam:  Normal sensation to light touch, pin-prick, vibration, and temperature; No sensory extinction. Cerebella Exam:  Intact finger-nose-finger, rapidly alternating movements, fine motor movements; No cerebellar rebound or drift; No tremors. Gait:  Gait was not tested. Reflexes: normal and symmetric bilaterally; Babinski is negative     Labs:    CBC:   Recent Labs     12/27/22  1617 12/28/22 0318   WBC 5.7 4.6   HGB 13.5 12.5    222   MCV 94.2 95.5   MCH 31.2 31.1   MCHC 33.1 32.6   RDW 12.2 12.2     CMP:  Recent Labs     12/27/22 1617 12/28/22 0318    136   K 3.5* 3.4*    99   CO2 26 25   BUN 12 11   CREATININE 0.72 0.67   LABGLOM >60 >60   GLUCOSE 243* 258*   CALCIUM 9.9 9.3     Liver:   Recent Labs     12/27/22 1617   AST 22   ALT 22   ALKPHOS 77   PROT 7.7   LABALBU 4.3   BILITOT 0.5     INR:   Recent Labs     12/27/22 1617 12/28/22 0318   PROTIME 16.9* 14.1   INR 1.4 1.1       ToxicologyNo results for input(s): PHENYTOIN, CARBTOT, PHENOBARB, VALPROATE, LAMOTRIG in the last 72 hours. Invalid input(s):  KEPPRA  No results for input(s): AMPMETHURSCR, BARBTQTU, BDZQTU, CANNABQUANT, COCMETQTU, OPIAU, PCPQUANT in the last 72 hours.     Radiology:  CT Head W/O Contrast    Result Date: 12/27/2022  EXAMINATION: CT OF THE HEAD WITHOUT CONTRAST  12/27/2022 3:46 pm TECHNIQUE: CT of the head was performed without the administration of intravenous contrast. Automated exposure control, iterative reconstruction, and/or weight based adjustment of the mA/kV was utilized to reduce the radiation dose to as low as reasonably achievable. COMPARISON: CT brain performed 03/09/2022. HISTORY: ORDERING SYSTEM PROVIDED HISTORY: right arm weakness rule out stroke TECHNOLOGIST PROVIDED HISTORY: right arm weakness rule out stroke Decision Support Exception - unselect if not a suspected or confirmed emergency medical condition->Emergency Medical Condition (MA) Reason for Exam: Right sided arm weakness, headache. LKW 2h ago. H/o stroke with rt sided deficits FINDINGS: BRAIN/VENTRICLES: There is no acute hemorrhage, mass effect, or midline shift. There are remote parietal infarcts bilaterally. The ventricles are symmetric. The infratentorial structures are unremarkable. ORBITS: The visualized portion of the orbits demonstrate no acute abnormality. SINUSES: The visualized paranasal sinuses and mastoid air cells demonstrate no acute abnormality. SOFT TISSUES/SKULL:  No acute abnormality of the visualized skull or soft tissues. Atrophy without acute intracranial abnormality. Remote bilateral parietal infarcts. Findings were sent to [facility] at 3:47 pm on 12/27/2022. XR CHEST PORTABLE    Result Date: 12/27/2022  EXAMINATION: ONE XRAY VIEW OF THE CHEST 12/27/2022 2:16 pm COMPARISON: 09/16/2020 HISTORY: ORDERING SYSTEM PROVIDED HISTORY: stroke symptoms TECHNOLOGIST PROVIDED HISTORY: stroke symptoms Reason for Exam: Stroke symptoms FINDINGS: Stable moderate hiatal hernia. .The cardiac size is normal. No acute infiltrates or pleural effusions are seen. Pulmonary vascularity appears normal. There is mild ectasia of the thoracic aorta. There are degenerative changes in the spine. Posttraumatic deformity of the proximal left humerus. No acute bony abnormalities. The hilar structures are normal. Surgical clips in the left axilla. Left-sided loop recorder.      No acute cardiopulmonary disease     CTA HEAD NECK W CONTRAST    Result Date: 12/27/2022  EXAMINATION: CTA OF THE HEAD AND NECK WITH CONTRAST 12/27/2022 4:59 pm: TECHNIQUE: CTA of the head and neck was performed with the administration of intravenous contrast. Multiplanar reformatted images are provided for review. MIP images are provided for review. Stenosis of the internal carotid arteries measured using NASCET criteria. Automated exposure control, iterative reconstruction, and/or weight based adjustment of the mA/kV was utilized to reduce the radiation dose to as low as reasonably achievable. This scan was analyzed using Viz. ai contact LVO. Identification of suspected findings is not for diagnostic use beyond notification. Viz LVO is limited to analysis of imaging data and should not be used in-lieu of full patient evaluation or relied upon to make or confirm diagnosis. And 02/12/2021 COMPARISON: Same-day CT head HISTORY: ORDERING SYSTEM PROVIDED HISTORY: rule out stroke TECHNOLOGIST PROVIDED HISTORY: rule out stroke Decision Support Exception - unselect if not a suspected or confirmed emergency medical condition->Emergency Medical Condition (MA) Reason for Exam: Right arm weakness. H/O 2 previous CVA's, 2 years ago and 3.5 years ago. H/O breast CA. FINDINGS: CTA NECK: AORTIC ARCH/ARCH VESSELS: No dissection or arterial injury. There is approximately 70% stenosis in the right subclavian artery. There is greater than 70% stenosis in the left subclavian artery. Almas Medin CAROTID ARTERIES: No dissection, arterial injury, or hemodynamically significant stenosis by NASCET criteria. VERTEBRAL ARTERIES: No dissection, arterial injury, or significant stenosis. SOFT TISSUES: The lung apices are clear. No cervical or superior mediastinal lymphadenopathy. The larynx and pharynx are unremarkable. No acute abnormality of the salivary and thyroid glands. BONES: No acute osseous abnormality. CTA HEAD: ANTERIOR CIRCULATION: No significant stenosis of the intracranial internal carotid, anterior cerebral, or middle cerebral arteries.  No aneurysm. There is a hypoplastic right A1 segment. POSTERIOR CIRCULATION: No significant stenosis of the vertebral, basilar, or posterior cerebral arteries. No aneurysm. OTHER: No dural venous sinus thrombosis on this non-dedicated study. BRAIN: No mass effect or midline shift. No extra-axial fluid collection. The gray-white differentiation is maintained. 1. No significant stenosis or occlusion of the cervical vasculature. 2. No large vessel occlusion intracranially. 3. 70% stenosis in the bilateral subclavian arteries. MRI BRAIN WO CONTRAST    Result Date: 12/28/2022  EXAMINATION: MRI OF THE BRAIN WITHOUT CONTRAST, 12/28/2022 1:48 pm TECHNIQUE: Multiplanar multisequence MRI of the brain was performed without the administration of intravenous contrast. COMPARISON: None HISTORY: ORDERING SYSTEM PROVIDED HISTORY:  Rule out stroke TECHNOLOGIST PROVIDED HISTORY: Rule out stroke Decision Support Exception - unselect if not a suspected or confirmed emergency medical condition->Emergency Medical Condition (MA) Reason for Exam:  C/O right arm heaviness, weakness, and nausea. FINDINGS: INTRACRANIAL STRUCTURES/VENTRICLES: There is no acute infarct. No mass effect or midline shift. No evidence of an acute intracranial hemorrhage. The ventricles and sulci are normal in size and configuration. The sellar/suprasellar regions appear unremarkable. The normal signal voids within the major intracranial vessels appear maintained. Encephalomalacia is identified within both parietal lobes greater on the right. Mild chronic microvascular disease is identified within the periventricular white matter of both cerebral hemispheres. Mild involutional changes are noted within the brain. ORBITS: The visualized portion of the orbits demonstrate no acute abnormality. SINUSES: The visualized paranasal sinuses and mastoid air cells demonstrate no acute abnormality.  BONES/SOFT TISSUES: The bone marrow signal intensity appears normal. The soft tissues demonstrate no acute abnormality. No acute ischemia. Bilateral parietal lobe encephalomalacia greater on the right. Mild chronic microvascular disease within the periventricular white matter. The patient's records from referring provider and available information in the EHR was reviewed. Impression:  TIA  Cerebrovascular Disease with h/o stroke in bilateral parietal lobes. Stroke risk factors: prior CVA; HTN, HLD, age    Patient presenting with brief weakness and similar symptoms as before in the vascular distribution of her prior stroke. Symptoms had resolved by the time of her arrival to the ED. Patient now back to baseline with negative workup. MRI was negative for any evidence of acute abnormalities. Currently on max therapy with Xarelto and baby aspirin that appears to be working with no recommendation for change at this time. Principal Problem:    Stroke-like symptom  Resolved Problems:    * No resolved hospital problems. *      Recommendations:                                            Continue on Xarelto and Aspirin  Continue on Crestor as before. Routine exercise therapy was strongly encouraged with 20-30 mins of low impact aerobic exercise. Please re-consult if further need. It was my pleasure to evaluate Saida Dias today. Please call with questions.       Electronically signed by Calos Boone MD on 12/28/2022 at 6:57 PM

## 2022-12-28 NOTE — PLAN OF CARE
Problem: Chronic Conditions and Co-morbidities  Goal: Patient's chronic conditions and co-morbidity symptoms are monitored and maintained or improved  12/28/2022 1801 by Jarred Lofton RN  Outcome: Progressing  12/28/2022 0610 by Malika Robbins RN  Outcome: Progressing  Flowsheets (Taken 12/28/2022 0200)  Care Plan - Patient's Chronic Conditions and Co-Morbidity Symptoms are Monitored and Maintained or Improved: Monitor and assess patient's chronic conditions and comorbid symptoms for stability, deterioration, or improvement     Problem: Discharge Planning  Goal: Discharge to home or other facility with appropriate resources  12/28/2022 1801 by Jarred Lofton RN  Outcome: Progressing  12/28/2022 0610 by Malika Robbins RN  Outcome: Progressing  Flowsheets (Taken 12/28/2022 0200)  Discharge to home or other facility with appropriate resources:   Identify barriers to discharge with patient and caregiver   Arrange for needed discharge resources and transportation as appropriate   Identify discharge learning needs (meds, wound care, etc)     Problem: Safety - Adult  Goal: Free from fall injury  12/28/2022 1801 by Jarred Lofton RN  Outcome: Progressing  12/28/2022 0610 by Malika Robbins RN  Outcome: Progressing

## 2022-12-28 NOTE — CARE COORDINATION
12/28/22 1107   Service Assessment   Patient Orientation Alert and Oriented;Person;Place;Situation;Self   Cognition Alert   History Provided By Patient   Primary Caregiver Self   Support Systems Spouse/Significant Other;Children   Patient's Healthcare Decision Maker is: Legal Next of Kin   PCP Verified by CM Yes   Last Visit to PCP Within last 3 months   Prior Functional Level Independent in ADLs/IADLs   Current Functional Level Independent in ADLs/IADLs   Can patient return to prior living arrangement Yes   Ability to make needs known: Good   Family able to assist with home care needs: Yes   Would you like for me to discuss the discharge plan with any other family members/significant others, and if so, who? No   Social/Functional History   Lives With Alone  (dina lives on 1st floor of duplex)   Home Layout One level   Home Access Stairs to enter with rails   Entrance Stairs - Number of Steps 1 flight. Lives in a duplex. Bathroom Shower/Tub Tub/Shower unit   Bathroom Toilet Standard   Bathroom Accessibility Accessible   Receives Help From Family; Neighbor   ADL Assistance Independent   Homemaking Assistance Independent   Ambulation Assistance Independent   Transfer Assistance Independent   Active  Yes   Mode of Transportation Car   Occupation Retired   Discharge Planning   Type of [address] Prior To Admission None   Potential Assistance Needed N/A   DME Ordered? No   Potential Assistance Purchasing Medications No   Type of Home Care Services None   Patient expects to be discharged to: Apartment   Follow Up Appointment: Best Day/Time  Monday AM   One/Two Story Residence One story   History of falls? 0   Services At/After Discharge   Transition of Care Consult (CM Consult) N/A   Services At/After Discharge None   [employer] Information Provided?  No   Mode of Transport at Discharge Other (see comment)  (dina)   Confirm Follow Up Transport Family   Condition of Participation: Discharge Planning   The Plan for Transition of Care is related to the following treatment goals: Home independently. The Patient and/or Patient Representative was provided with a Choice of Provider? Patient   The Patient and/Or Patient Representative agree with the Discharge Plan? Yes   Freedom of Choice list was provided with basic dialogue that supports the patient's individualized plan of care/goals, treatment preferences, and shares the quality data associated with the providers? Yes   Plan is home independently. Declines any skilled needs. No DME needs. Has transport home. [facility]. Waiting on MRI results.

## 2022-12-28 NOTE — ED NOTES
ED to inpatient nurses report     Chief Complaint   Patient presents with    Cerebrovascular Accident      Present to ED from Pt to er with c/o right arm heaviness. Pt states she was shopping and had episode of weakness and nausea. Pt states she felt like she had a \"jolt\" go through her right arm. Pt denies weakness to other limbs. Pt denies problems with speech. Pt states she has had previous stroke and has some deficit to right arm from the previous stroke. Pt a&ox3. Skin warm and dry. Speech clear. Pt answering questions appropriately and following commands. LOC: a&ox4  Vital signs   Vitals:    12/27/22 1539   BP: (!) 134/96   Pulse: 96   Resp: 12   Temp: 98.8 °F (37.1 °C)   TempSrc: Oral   SpO2: 96%   Weight: 125 lb (56.7 kg)   Height: 4' 11\" (1.499 m)      Oxygen Baseline room air    Current needs required none      LDAs:   Peripheral IV 12/27/22 Left;Upper Forearm (Active)   Site Assessment Clean, dry & intact 12/27/22 1616     Mobility: ambulatory   Fall Risk:  no  Pending ED orders: mri  Present condition: stable  Code Status: full  Consults: IP CONSULT TO PHARMACY  PHARMACY TO CHANGE BASE FLUIDS  IP CONSULT TO INTERNAL MEDICINE  IP CONSULT TO NEUROLOGY  [x]  Hospitalist  Completed  [] yes [] no Who:   []  Medicine  Completed  [] yes [] No Who:   []  Cardiology  Completed  [] yes [] No Who:   []  GI   Completed  [] yes [] No Who:   [x]  Neurology  Completed  [] yes [] No Who:   []  Nephrology Completed  [] yes [] No Who:    []  Vascular  Completed  [] yes [] No Who:   []  Ortho  Completed  [] yes [] No Who:     []  Surgery  Completed  [] yes [] No Who:    []  Urology  Completed  [] yes [] No Who:    []  CT Surgery Completed  [] yes [] No Who:   []  Podiatry  Completed  [] yes [] No Who:    []  Other    Completed  [] yes [] No Who:  Interventions: mag, aspirin.  Zofran, tylenol  Important Events: none        Electronically signed by Michael Killian RN on 12/27/2022 at 7:01 PM     Moira Gannon RN  12/27/22 1903

## 2022-12-29 VITALS
BODY MASS INDEX: 25.4 KG/M2 | OXYGEN SATURATION: 96 % | HEART RATE: 84 BPM | HEIGHT: 59 IN | WEIGHT: 126 LBS | DIASTOLIC BLOOD PRESSURE: 94 MMHG | TEMPERATURE: 98.4 F | RESPIRATION RATE: 15 BRPM | SYSTOLIC BLOOD PRESSURE: 123 MMHG

## 2022-12-29 LAB
ABSOLUTE EOS #: 0.28 K/UL (ref 0–0.44)
ABSOLUTE IMMATURE GRANULOCYTE: 0.01 K/UL (ref 0–0.3)
ABSOLUTE LYMPH #: 2.44 K/UL (ref 1.1–3.7)
ABSOLUTE MONO #: 0.43 K/UL (ref 0.1–1.2)
ANION GAP SERPL CALCULATED.3IONS-SCNC: 11 MMOL/L (ref 9–17)
BASOPHILS # BLD: 1 % (ref 0–2)
BASOPHILS ABSOLUTE: 0.04 K/UL (ref 0–0.2)
BUN BLDV-MCNC: 16 MG/DL (ref 8–23)
BUN/CREAT BLD: 28 (ref 9–20)
CALCIUM SERPL-MCNC: 9.3 MG/DL (ref 8.6–10.4)
CHLORIDE BLD-SCNC: 105 MMOL/L (ref 98–107)
CO2: 26 MMOL/L (ref 20–31)
CREAT SERPL-MCNC: 0.58 MG/DL (ref 0.5–0.9)
EOSINOPHILS RELATIVE PERCENT: 6 % (ref 1–4)
ESTIMATED AVERAGE GLUCOSE: 249 MG/DL
GFR SERPL CREATININE-BSD FRML MDRD: >60 ML/MIN/1.73M2
GLUCOSE BLD-MCNC: 135 MG/DL (ref 65–105)
GLUCOSE BLD-MCNC: 154 MG/DL (ref 65–105)
GLUCOSE BLD-MCNC: 209 MG/DL (ref 65–105)
GLUCOSE BLD-MCNC: 244 MG/DL (ref 65–105)
GLUCOSE BLD-MCNC: 245 MG/DL (ref 65–105)
GLUCOSE BLD-MCNC: 367 MG/DL (ref 65–105)
GLUCOSE BLD-MCNC: 84 MG/DL (ref 70–99)
GLUCOSE BLD-MCNC: 88 MG/DL (ref 65–105)
HBA1C MFR BLD: 10.3 % (ref 4–6)
HCT VFR BLD CALC: 37.4 % (ref 36.3–47.1)
HEMOGLOBIN: 11.9 G/DL (ref 11.9–15.1)
IMMATURE GRANULOCYTES: 0 %
LV EF: 65 %
LVEF MODALITY: NORMAL
LYMPHOCYTES # BLD: 48 % (ref 24–43)
MCH RBC QN AUTO: 30.3 PG (ref 25.2–33.5)
MCHC RBC AUTO-ENTMCNC: 31.8 G/DL (ref 28.4–34.8)
MCV RBC AUTO: 95.2 FL (ref 82.6–102.9)
MONOCYTES # BLD: 9 % (ref 3–12)
NRBC AUTOMATED: 0 PER 100 WBC
PDW BLD-RTO: 12.1 % (ref 11.8–14.4)
PLATELET # BLD: 226 K/UL (ref 138–453)
PMV BLD AUTO: 10.1 FL (ref 8.1–13.5)
POTASSIUM SERPL-SCNC: 3.6 MMOL/L (ref 3.7–5.3)
RBC # BLD: 3.93 M/UL (ref 3.95–5.11)
SEG NEUTROPHILS: 36 % (ref 36–65)
SEGMENTED NEUTROPHILS ABSOLUTE COUNT: 1.77 K/UL (ref 1.5–8.1)
SODIUM BLD-SCNC: 142 MMOL/L (ref 135–144)
WBC # BLD: 5 K/UL (ref 3.5–11.3)

## 2022-12-29 PROCEDURE — 6370000000 HC RX 637 (ALT 250 FOR IP): Performed by: NURSE PRACTITIONER

## 2022-12-29 PROCEDURE — 82947 ASSAY GLUCOSE BLOOD QUANT: CPT

## 2022-12-29 PROCEDURE — 36415 COLL VENOUS BLD VENIPUNCTURE: CPT

## 2022-12-29 PROCEDURE — 93306 TTE W/DOPPLER COMPLETE: CPT

## 2022-12-29 PROCEDURE — 2580000003 HC RX 258: Performed by: NURSE PRACTITIONER

## 2022-12-29 PROCEDURE — 80048 BASIC METABOLIC PNL TOTAL CA: CPT

## 2022-12-29 PROCEDURE — 85025 COMPLETE CBC W/AUTO DIFF WBC: CPT

## 2022-12-29 PROCEDURE — 6370000000 HC RX 637 (ALT 250 FOR IP): Performed by: FAMILY MEDICINE

## 2022-12-29 RX ADMIN — RIVAROXABAN 20 MG: 20 TABLET, FILM COATED ORAL at 17:42

## 2022-12-29 RX ADMIN — PREGABALIN 225 MG: 75 CAPSULE ORAL at 09:28

## 2022-12-29 RX ADMIN — SODIUM CHLORIDE, PRESERVATIVE FREE 10 ML: 5 INJECTION INTRAVENOUS at 09:44

## 2022-12-29 RX ADMIN — INSULIN LISPRO 8 UNITS: 100 INJECTION, SOLUTION INTRAVENOUS; SUBCUTANEOUS at 17:42

## 2022-12-29 RX ADMIN — METOPROLOL TARTRATE 12.5 MG: 25 TABLET, FILM COATED ORAL at 09:28

## 2022-12-29 RX ADMIN — INSULIN GLARGINE 58 UNITS: 100 INJECTION, SOLUTION SUBCUTANEOUS at 09:34

## 2022-12-29 RX ADMIN — INSULIN LISPRO 2 UNITS: 100 INJECTION, SOLUTION INTRAVENOUS; SUBCUTANEOUS at 13:36

## 2022-12-29 RX ADMIN — ALLOPURINOL 100 MG: 100 TABLET ORAL at 09:28

## 2022-12-29 RX ADMIN — BRIMONIDINE TARTRATE 1 DROP: 2 SOLUTION OPHTHALMIC at 09:41

## 2022-12-29 RX ADMIN — ACETAMINOPHEN 650 MG: 325 TABLET ORAL at 09:41

## 2022-12-29 RX ADMIN — ASPIRIN 81 MG: 81 TABLET, CHEWABLE ORAL at 09:28

## 2022-12-29 RX ADMIN — PANCRELIPASE LIPASE, PANCRELIPASE PROTEASE, PANCRELIPASE AMYLASE 15000 UNITS: 15000; 47000; 63000 CAPSULE, DELAYED RELEASE ORAL at 17:42

## 2022-12-29 RX ADMIN — FAMOTIDINE 20 MG: 20 TABLET, FILM COATED ORAL at 09:28

## 2022-12-29 RX ADMIN — PANCRELIPASE LIPASE, PANCRELIPASE PROTEASE, PANCRELIPASE AMYLASE 15000 UNITS: 15000; 47000; 63000 CAPSULE, DELAYED RELEASE ORAL at 09:41

## 2022-12-29 RX ADMIN — INSULIN LISPRO 2 UNITS: 100 INJECTION, SOLUTION INTRAVENOUS; SUBCUTANEOUS at 06:04

## 2022-12-29 RX ADMIN — LEVOTHYROXINE SODIUM 100 MCG: 0.1 TABLET ORAL at 06:00

## 2022-12-29 RX ADMIN — ROSUVASTATIN 40 MG: 40 TABLET, FILM COATED ORAL at 17:42

## 2022-12-29 ASSESSMENT — PAIN SCALES - GENERAL: PAINLEVEL_OUTOF10: 2

## 2022-12-29 ASSESSMENT — PAIN DESCRIPTION - LOCATION: LOCATION: HEAD

## 2022-12-29 NOTE — DISCHARGE SUMMARY
4 [facility].,    Adult Hospitalist      Patient ID: Kirstin Han  MRN: [id number]     Acct:  [de-identified]       Patient's PCP: Jina Hernandez MD    Admit Date: 12/27/2022     Discharge Date:   12/29/22    Admitting Physician: Lilian Del Rio MD    Discharge Physician: Man Jerez MD     CONSULTANTS: Patient Care Team:  Jian Hernandez MD as PCP - General (Family Medicine)  Ameena Carrion (Inactive) (Internal Medicine)  Maira Mistry MD as Surgeon (Radiation Oncology)    PROCEDURES PERFORMED:     Active Discharge Diagnoses:  Right-sided weakness-rule out TIA  History of CVA with residual right-sided weakness 3 years ago  Hypertension  Hyperlipidemia  History of breast cancer  Major depression      Primary Problem  Stroke-like symptom    Hospital Course:     72-year-old-year-old lady with past medical history of hypertension, hyperlipidemia, breast cancer, depression presented to ER complaining of right upper extremity weakness. Per her description she was at the grocery store where she had sudden onset of feeling nauseous, unsteady with heaviness in her right arm. Denies any changes in her speech, vision or syncope. Patient has a previous history of stroke with right-sided residual weakness 3 years ago. Patient is on Eliquis. Patient denies any chest pain, cough, cold, change urination, elevated rash. On presentation potassium was 3.4. CT brain showed remote bilateral parietal infarcts. CTA head and neck shows 70% stenosis in the bilateral subclavian arteries . Patient admitted for further management. Patient was worked up with echocardiogram, MRI brain which did not show any acute changes. Neurology evaluated the patient. Recommended to continue aspirin, Xarelto and Crestor.     At the time of discharge patient was hemodynamically stable and asymptomatic       The plan was discussed in detail with patient who agreed with the plan and verbalized understanding .    The patient was seen and examined on day of discharge and this discharge summary is in conjunction with any daily progress note from day of discharge. Hospital Data:    Labs:    Hematology:  Recent Labs     12/27/22  1617 12/28/22 0318 12/29/22  0335   WBC 5.7 4.6 5.0   RBC 4.33 4.02 3.93*   HGB 13.5 12.5 11.9   HCT 40.8 38.4 37.4   MCV 94.2 95.5 95.2   MCH 31.2 31.1 30.3   MCHC 33.1 32.6 31.8   RDW 12.2 12.2 12.1    222 226   MPV 10.6 10.2 10.1   INR 1.4 1.1  --      Chemistry:  Recent Labs     12/27/22  1617 12/28/22 0318 12/29/22  0335    136 142   K 3.5* 3.4* 3.6*    99 105   CO2 26 25 26   GLUCOSE 243* 258* 84   BUN 12 11 16   CREATININE 0.72 0.67 0.58   MG 1.4* 1.8  --    ANIONGAP 15 12 11   LABGLOM >60 >60 >60   CALCIUM 9.9 9.3 9.3   TROPHS 7  --   --      Recent Labs     12/27/22  1617 12/28/22  0218 12/28/22  1607 12/28/22  1634 12/29/22  0036 12/29/22  0427 12/29/22  0558 12/29/22  0831 12/29/22  1109 12/29/22  1542   PROT 7.7  --   --   --   --   --   --   --   --   --    LABALBU 4.3  --   --   --   --   --   --   --   --   --    LABA1C  --    < > 10.4*  --   --   --   --   --   --   --    AST 22  --   --   --   --   --   --   --   --   --    ALT 22  --   --   --   --   --   --   --   --   --    ALKPHOS 77  --   --   --   --   --   --   --   --   --    BILITOT 0.5  --   --   --   --   --   --   --   --   --    CHOL  --   --  94  --   --   --   --   --   --   --    HDL  --   --  39*  --   --   --   --   --   --   --    LDLCHOLESTEROL  --   --  29  --   --   --   --   --   --   --    CHOLHDLRATIO  --   --  2.4  --   --   --   --   --   --   --    TRIG  --   --  132  --   --   --   --   --   --   --    POCGLU  --    < >  --    < > 135* 88 209* 154* 245* 367*    < > = values in this interval not displayed.      Lab Results   Component Value Date    INR 1.1 12/28/2022    INR 1.4 12/27/2022    INR 0.9 09/16/2020    PROTIME 14.1 12/28/2022    PROTIME 16.9 (H) 12/27/2022    PROTIME 9.4 2020     Lab Results   Component Value Date/Time    SPECIAL NOT REPORTED 2012 02:00 PM     Lab Results   Component Value Date/Time    CULTURE KLEBSIELLA PNEUMONIAE >845680 CFU/ML (A) 2012 02:00 PM    CULTURE  2012 02:00 PM     [facility] [address] [facility]  [phone]       Lab Results   Component Value Date/Time    FIO2 NOT REPORTED 2020 08:21 PM       Radiology:    CT Head W/O Contrast    Result Date: 2022  Atrophy without acute intracranial abnormality. Remote bilateral parietal infarcts. Findings were sent to [facility] at 2:94 pm on 2022. XR CHEST PORTABLE    Result Date: 2022  No acute cardiopulmonary disease     CTA HEAD NECK W CONTRAST    Result Date: 2022  1. No significant stenosis or occlusion of the cervical vasculature. 2. No large vessel occlusion intracranially. 3. 70% stenosis in the bilateral subclavian arteries. MRI BRAIN WO CONTRAST    Result Date: 2022  No acute ischemia. Bilateral parietal lobe encephalomalacia greater on the right. Mild chronic microvascular disease within the periventricular white matter.          All radiological studies reviewed      Reviews of Symptoms:    A 10 point system is reviewed and  negative except described in hospital course    Physical Exam:    Vitals:  BP (!) 120/92   Pulse 86   Temp 98.4 °F (36.9 °C) (Temporal)   Resp 14   Ht 4' 11\" (1.499 m)   Wt 126 lb (57.2 kg)   SpO2 96%   BMI 25.45 kg/m²   Temp (24hrs), Av °F (36.7 °C), Min:97.3 °F (36.3 °C), Max:98.6 °F (37 °C)      General appearance - alert, well appearing, and in no acute distress  Mental status - oriented to person, place, and time with normal affect  Head - normocephalic and atraumatic  Eyes - pupils equal and reactive, extraocular eye movements intact, conjunctiva clear  Ears - hearing appears to be intact  Nose - no drainage noted  Mouth - mucous membranes moist  Neck - supple, no carotid bruits, thyroid not palpable  Chest - clear to auscultation, normal effort  Heart - normal rate, regular rhythm, no murmur  Abdomen - soft, nontender, nondistended, bowel sounds present all four quadrants, no masses, hepatomegaly or splenomegaly  Neurological - normal speech, no focal findings or movement disorder noted, cranial nerves II through XII grossly intact  Extremities - peripheral pulses palpable, no pedal edema or calf pain with palpation  Skin - no gross lesions, rashes, or induration noted      Consults:  IP CONSULT TO PHARMACY  PHARMACY TO CHANGE BASE FLUIDS  IP CONSULT TO INTERNAL MEDICINE  IP CONSULT TO NEUROLOGY  IP CONSULT TO NEUROLOGY    Disposition: Home    Discharged Condition: Stable    Follow Up: No follow-up provider specified. Lab Frequency Next Occurrence   Initiate Oxygen Therapy Protocol PRN    POCT glucose 4 TIMES DAILY BEFORE MEALS & AT BEDTIME    POCT glucose - If patient is NPO, TPN, or continuous tube feed and on HumaLOG NOW THEN EVERY 4 HOURS    HYPOGLYCEMIA TREATMENT: blood glucose LESS THAN 70 mg/dL and patient ALERT and TOLERATING PO PRN    HYPOGLYCEMIA TREATMENT: blood glucose LESS THAN 70 mg/dL and patient NOT ALERT or NPO PRN    POCT Glucose PRN    Up with assistance PRN    Intake and output EVERY 8 HOURS    Initiate Oxygen Therapy Protocol PRN    Pulse Oximetry Spot Check PRN    Basic Metabolic Panel w/ Reflex to MG Daily (Lab)    CBC auto differential Daily (Lab)    POCT glucose 4 TIMES DAILY BEFORE MEALS & AT BEDTIME    POCT glucose - If patient is NPO, TPN, or continuous tube feed and on HumaLOG NOW THEN EVERY 4 HOURS    HYPOGLYCEMIA TREATMENT: blood glucose LESS THAN 70 mg/dL and patient ALERT and TOLERATING PO PRN    HYPOGLYCEMIA TREATMENT: blood glucose LESS THAN 70 mg/dL and patient NOT ALERT or NPO PRN    POCT Glucose PRN          Diet: ADULT DIET;  Regular    Discharge Medications:      Medication List        CONTINUE taking these medications      Accu-Chek Rhianna Plus strip  Generic drug: blood glucose test strips     allopurinol 100 MG tablet  Commonly known as: ZYLOPRIM     aspirin 81 MG tablet     B-D UF III MINI PEN NEEDLES 31G X 5 MM Misc  Generic drug: Insulin Pen Needle     Blood Pressure Kit  Check blood pressure daily. brimonidine 0.2 % ophthalmic solution  Commonly known as: ALPHAGAN     colchicine 0.6 MG tablet  Commonly known as: COLCRYS     HumaLOG KwikPen 100 UNIT/ML Sopn  Generic drug: insulin lispro (1 Unit Dial)     Lancing Device Misc     levothyroxine 100 MCG tablet  Commonly known as: SYNTHROID     lipase-protease-amylase 39746-50802 units delayed release capsule  Commonly known as: CREON     metoprolol tartrate 25 MG tablet  Commonly known as: LOPRESSOR     multivitamin tablet  Take 1 tablet by mouth daily. pantoprazole 40 MG tablet  Commonly known as: PROTONIX     pregabalin 225 MG capsule  Commonly known as: LYRICA     rosuvastatin 40 MG tablet  Commonly known as: CRESTOR     Vitamin D 25 MCG (1000 UT) Tabs tablet  Commonly known as: CHOLECALCIFEROL     Xarelto 20 MG Tabs tablet  Generic drug: rivaroxaban            STOP taking these medications      Tresiba FlexTouch 200 UNIT/ML Sopn  Generic drug: Insulin Degludec              Code Status:  Full Code    Time Spent on discharge is  35 mins in patient examination, evaluation, counseling as well as medication reconciliation, prescriptions for required medications, discharge plan and follow up. Electronically signed by Shannon Garcia MD on 12/29/2022 at 4:46 PM     Thank you Dr. Emilee Casanova MD for the opportunity to be involved in this patient's care. This note was created with the assistance of a speech-recognition program.  Although the intention is to generate a document that actually reflects the content of the visit, no guarantees can be provided that every mistake has been identified and corrected by editing.      Note was updated later by me after  physical examination and completion of the assessment.

## 2022-12-29 NOTE — PLAN OF CARE
Problem: Chronic Conditions and Co-morbidities  Goal: Patient's chronic conditions and co-morbidity symptoms are monitored and maintained or improved  12/29/2022 0121 by Suma Vila RN  Outcome: Progressing  Flowsheets (Taken 12/28/2022 0200 by Su Mo RN)  Care Plan - Patient's Chronic Conditions and Co-Morbidity Symptoms are Monitored and Maintained or Improved: Monitor and assess patient's chronic conditions and comorbid symptoms for stability, deterioration, or improvement  12/28/2022 1801 by Aracely Gomes RN  Outcome: Progressing     Problem: Discharge Planning  Goal: Discharge to home or other facility with appropriate resources  12/29/2022 0121 by Suma Vila RN  Outcome: Progressing  Flowsheets (Taken 12/28/2022 0200 by Su Mo RN)  Discharge to home or other facility with appropriate resources:   Identify barriers to discharge with patient and caregiver   Arrange for needed discharge resources and transportation as appropriate   Identify discharge learning needs (meds, wound care, etc)  12/28/2022 1801 by Aracely Gomes RN  Outcome: Progressing     Problem: Safety - Adult  Goal: Free from fall injury  12/29/2022 0121 by Suma Vila RN  Outcome: Progressing  12/28/2022 1801 by Aracely Gomes RN  Outcome: Progressing     Problem: Skin/Tissue Integrity - Adult  Goal: Skin integrity remains intact  Outcome: Progressing     Problem: Musculoskeletal - Adult  Goal: Return mobility to safest level of function  Outcome: Progressing

## 2022-12-30 NOTE — PROGRESS NOTES
Discharge Note:      All discharge instructions given at this time as well as all patient belongings returned to patient. Pt denies any further questions regarding discharge at this time. Pt given also given discharge packet with unit letter, discharge instructions/restrictions and medication handouts regarding all discharge medications and side effects. Pt denies any further issues at this time. Pt wheeled out to front discharge doors at this time. Pt left premises without any issues in private vehicle with spouse at this time.

## 2022-12-30 NOTE — FLOWSHEET NOTE
12/29/22 1844   Treatment Team Notification   Reason for Communication Review case   Team Member Name Dr Raquel Quezada Provider   Method of Communication Secure Message   Response Other (Comment)     Notified of pending d/c, MD gallagher with d/c from neuro s/p.

## 2023-01-08 NOTE — PROGRESS NOTES
Physician Progress Note      PATIENT:               Jessica Don  CSN #:                  [id number]  :                       1955  ADMIT DATE:       2022 3:31 PM  [address] [place] DATE:        2022 7:44 PM  RESPONDING  PROVIDER #:        Vandana De Guzman MD          QUERY TEXT:    Pt admitted with R arm weakness. Pt noted to have TIA on neuro consult note. Also noted to have stenosis in bilateral subclavian arteries on CT imaging. If   possible, please document in progress note the etiology, of TIA:    The medical record reflects the following:  Risk Factors: HX CVA with residual R arm weakness 3 yrs ago. Clinical Indicators: CTA HEAD NECK--> 70% stenosis in the bilateral subclavian   arteries; CT HEAD--> Atrophy without acute intracranial abnormality. Remote   bilateral parietal infarcts; MRI BRAIN--> Bilateral parietal lobe   encephalomalacia greater on the right. Mild chronic microvascular disease   within the periventricular white matter; per Neuro consult--> TIA,   Cerebrovascular Disease with h/o stroke in bilateral parietal lobes. Treatment: Continue PO Xarelto, PO Aspirin, PO Crestor; Neuro consult, CTA   HEAD NECK, CT HEAD, MRI BRAIN, monitoring, hospital admission  Options provided:  -- TIA due to stenosis of bilateral subclavian arteries  -- TIA due to, please specify cause. -- Other - I will add my own diagnosis  -- Disagree - Not applicable / Not valid  -- Disagree - Clinically unable to determine / Unknown  -- Refer to Clinical Documentation Reviewer    PROVIDER RESPONSE TEXT:    Provider is clinically unable to determine a response to this query.     Query created by: Vickie Carpetner on 2023 9:26 AM      Electronically signed by:  Vandana De Guzman MD 2023 5:27 PM

## 2024-03-03 ENCOUNTER — APPOINTMENT (OUTPATIENT)
Dept: CT IMAGING | Age: 69
End: 2024-03-03
Payer: MEDICARE

## 2024-03-03 ENCOUNTER — HOSPITAL ENCOUNTER (EMERGENCY)
Age: 69
Discharge: HOME | End: 2024-03-03
Attending: EMERGENCY MEDICINE
Payer: MEDICARE

## 2024-03-03 ENCOUNTER — APPOINTMENT (OUTPATIENT)
Dept: GENERAL RADIOLOGY | Age: 69
End: 2024-03-03
Payer: MEDICARE

## 2024-03-03 VITALS
WEIGHT: 106 LBS | BODY MASS INDEX: 21.37 KG/M2 | TEMPERATURE: 97.6 F | SYSTOLIC BLOOD PRESSURE: 144 MMHG | OXYGEN SATURATION: 100 % | RESPIRATION RATE: 18 BRPM | HEART RATE: 91 BPM | HEIGHT: 59 IN | DIASTOLIC BLOOD PRESSURE: 92 MMHG

## 2024-03-03 DIAGNOSIS — K86.89 DILATION OF PANCREATIC DUCT: ICD-10-CM

## 2024-03-03 DIAGNOSIS — R74.8 ELEVATED LIVER ENZYMES: ICD-10-CM

## 2024-03-03 DIAGNOSIS — S09.90XA CLOSED HEAD INJURY, INITIAL ENCOUNTER: Primary | ICD-10-CM
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
## 2024-03-03 LAB
ALBUMIN SERPL-MCNC: 3.7 G/DL (ref 3.5–5.2)
ALP SERPL-CCNC: 73 U/L (ref 35–104)
ALT SERPL-CCNC: 84 U/L (ref 5–33)
ANION GAP SERPL CALCULATED.3IONS-SCNC: 13 MMOL/L (ref 9–17)
AST SERPL-CCNC: 86 U/L
BASOPHILS # BLD: 0.03 K/UL (ref 0–0.2)
BASOPHILS NFR BLD: 1 % (ref 0–2)
BILIRUB DIRECT SERPL-MCNC: 0.1 MG/DL
BILIRUB INDIRECT SERPL-MCNC: 0.3 MG/DL (ref 0–1)
BILIRUB SERPL-MCNC: 0.4 MG/DL (ref 0.3–1.2)
BILIRUB UR QL STRIP: NEGATIVE
BUN SERPL-MCNC: 9 MG/DL (ref 8–23)
BUN/CREAT SERPL: 13 (ref 9–20)
CALCIUM SERPL-MCNC: 8.5 MG/DL (ref 8.6–10.4)
CHLORIDE SERPL-SCNC: 106 MMOL/L (ref 98–107)
CLARITY UR: CLEAR
CO2 SERPL-SCNC: 22 MMOL/L (ref 20–31)
COLOR UR: YELLOW
CREAT SERPL-MCNC: 0.7 MG/DL (ref 0.5–0.9)
EOSINOPHIL # BLD: 0.04 K/UL (ref 0–0.44)
EOSINOPHILS RELATIVE PERCENT: 1 % (ref 1–4)
ERYTHROCYTE [DISTWIDTH] IN BLOOD BY AUTOMATED COUNT: 12.2 % (ref 11.8–14.4)
GFR SERPL CREATININE-BSD FRML MDRD: >60 ML/MIN/1.73M2
GLUCOSE SERPL-MCNC: 136 MG/DL (ref 70–99)
GLUCOSE UR STRIP-MCNC: NEGATIVE MG/DL
HCT VFR BLD AUTO: 39.7 % (ref 36.3–47.1)
HGB BLD-MCNC: 12.9 G/DL (ref 11.9–15.1)
HGB UR QL STRIP.AUTO: NEGATIVE
IMM GRANULOCYTES # BLD AUTO: 0.01 K/UL (ref 0–0.3)
IMM GRANULOCYTES NFR BLD: 0 %
INR PPP: 1.3
KETONES UR STRIP-MCNC: NEGATIVE MG/DL
LEUKOCYTE ESTERASE UR QL STRIP: NEGATIVE
LIPASE SERPL-CCNC: 9 U/L (ref 13–60)
LYMPHOCYTES NFR BLD: 1.76 K/UL (ref 1.1–3.7)
LYMPHOCYTES RELATIVE PERCENT: 36 % (ref 24–43)
MAGNESIUM SERPL-MCNC: 1.7 MG/DL (ref 1.6–2.6)
MCH RBC QN AUTO: 31.6 PG (ref 25.2–33.5)
MCHC RBC AUTO-ENTMCNC: 32.5 G/DL (ref 28.4–34.8)
MCV RBC AUTO: 97.3 FL (ref 82.6–102.9)
MONOCYTES NFR BLD: 0.41 K/UL (ref 0.1–1.2)
MONOCYTES NFR BLD: 9 % (ref 3–12)
NEUTROPHILS NFR BLD: 53 % (ref 36–65)
NEUTS SEG NFR BLD: 2.6 K/UL (ref 1.5–8.1)
NITRITE UR QL STRIP: NEGATIVE
NRBC BLD-RTO: 0 PER 100 WBC
PARTIAL THROMBOPLASTIN TIME: 39.3 SEC (ref 23.9–33.8)
PH UR STRIP: 6.5 [PH] (ref 5–8)
PHOSPHATE SERPL-MCNC: 2.4 MG/DL (ref 2.6–4.5)
PLATELET # BLD AUTO: 187 K/UL (ref 138–453)
PMV BLD AUTO: 10.5 FL (ref 8.1–13.5)
POTASSIUM SERPL-SCNC: 3.5 MMOL/L (ref 3.7–5.3)
PROT SERPL-MCNC: 6.9 G/DL (ref 6.4–8.3)
PROT UR STRIP-MCNC: NEGATIVE MG/DL
PROTHROMBIN TIME: 15.7 SEC (ref 11.5–14.2)
RBC # BLD AUTO: 4.08 M/UL (ref 3.95–5.11)
SODIUM SERPL-SCNC: 141 MMOL/L (ref 135–144)
SP GR UR STRIP: 1.02 (ref 1–1.03)
TROPONIN I SERPL HS-MCNC: 11 NG/L (ref 0–14)
UROBILINOGEN UR STRIP-ACNC: NORMAL EU/DL (ref 0–1)
WBC OTHER # BLD: 4.9 K/UL (ref 3.5–11.3)

## 2024-03-03 PROCEDURE — 71260 CT THORAX DX C+: CPT

## 2024-03-03 PROCEDURE — 6360000004 HC RX CONTRAST MEDICATION: Performed by: EMERGENCY MEDICINE

## 2024-03-03 PROCEDURE — 80076 HEPATIC FUNCTION PANEL: CPT

## 2024-03-03 PROCEDURE — 83735 ASSAY OF MAGNESIUM: CPT

## 2024-03-03 PROCEDURE — 2580000003 HC RX 258: Performed by: EMERGENCY MEDICINE

## 2024-03-03 PROCEDURE — 96374 THER/PROPH/DIAG INJ IV PUSH: CPT

## 2024-03-03 PROCEDURE — 84484 ASSAY OF TROPONIN QUANT: CPT

## 2024-03-03 PROCEDURE — 85025 COMPLETE CBC W/AUTO DIFF WBC: CPT

## 2024-03-03 PROCEDURE — 80048 BASIC METABOLIC PNL TOTAL CA: CPT

## 2024-03-03 PROCEDURE — 83690 ASSAY OF LIPASE: CPT

## 2024-03-03 PROCEDURE — 85730 THROMBOPLASTIN TIME PARTIAL: CPT

## 2024-03-03 PROCEDURE — 81003 URINALYSIS AUTO W/O SCOPE: CPT

## 2024-03-03 PROCEDURE — 36415 COLL VENOUS BLD VENIPUNCTURE: CPT

## 2024-03-03 PROCEDURE — 85610 PROTHROMBIN TIME: CPT

## 2024-03-03 PROCEDURE — 72125 CT NECK SPINE W/O DYE: CPT

## 2024-03-03 PROCEDURE — 70450 CT HEAD/BRAIN W/O DYE: CPT

## 2024-03-03 PROCEDURE — 71045 X-RAY EXAM CHEST 1 VIEW: CPT

## 2024-03-03 PROCEDURE — 93005 ELECTROCARDIOGRAM TRACING: CPT | Performed by: EMERGENCY MEDICINE

## 2024-03-03 PROCEDURE — 99285 EMERGENCY DEPT VISIT HI MDM: CPT

## 2024-03-03 PROCEDURE — 6360000002 HC RX W HCPCS: Performed by: EMERGENCY MEDICINE

## 2024-03-03 PROCEDURE — 84100 ASSAY OF PHOSPHORUS: CPT

## 2024-03-03 RX ORDER — CYCLOBENZAPRINE HCL 10 MG
10 TABLET ORAL 3 TIMES DAILY PRN
Qty: 21 TABLET | Refills: 0 | Status: SHIPPED | OUTPATIENT
Start: 2024-03-03 | End: 2024-03-13

## 2024-03-03 RX ORDER — 0.9 % SODIUM CHLORIDE 0.9 %
80 INTRAVENOUS SOLUTION INTRAVENOUS ONCE
Status: DISCONTINUED | OUTPATIENT
Start: 2024-03-03 | End: 2024-03-03 | Stop reason: HOSPADM

## 2024-03-03 RX ORDER — MORPHINE SULFATE 2 MG/ML
2 INJECTION, SOLUTION INTRAMUSCULAR; INTRAVENOUS ONCE
Status: COMPLETED | OUTPATIENT
Start: 2024-03-03 | End: 2024-03-03

## 2024-03-03 RX ORDER — SODIUM CHLORIDE 0.9 % (FLUSH) 0.9 %
10 SYRINGE (ML) INJECTION PRN
Status: DISCONTINUED | OUTPATIENT
Start: 2024-03-03 | End: 2024-03-03 | Stop reason: HOSPADM

## 2024-03-03 RX ADMIN — Medication 100 ML: at 16:14

## 2024-03-03 RX ADMIN — MORPHINE SULFATE 2 MG: 2 INJECTION, SOLUTION INTRAMUSCULAR; INTRAVENOUS at 14:53

## 2024-03-03 RX ADMIN — SODIUM CHLORIDE, PRESERVATIVE FREE 10 ML: 5 INJECTION INTRAVENOUS at 16:14

## 2024-03-03 RX ADMIN — IOPAMIDOL 75 ML: 755 INJECTION, SOLUTION INTRAVENOUS at 16:13

## 2024-03-03 ASSESSMENT — HEART SCORE: ECG: 0

## 2024-03-03 ASSESSMENT — PAIN - FUNCTIONAL ASSESSMENT: PAIN_FUNCTIONAL_ASSESSMENT: 0-10

## 2024-03-03 ASSESSMENT — PAIN SCALES - GENERAL
PAINLEVEL_OUTOF10: 7
PAINLEVEL_OUTOF10: 5

## 2024-03-03 NOTE — ED PROVIDER NOTES
dilation.  Cardiac workup in the ER did not display positive signs of acute cardiac ischemia.  EKG was reviewed and interpreted by myself, the ED physician.  The patient did feel comfortable going home.  Patient was instructed to follow-up with the primary care physician within 2 days and to return to the ER immediately if symptoms worsen or change.  Patient understands and agrees with the plan.        CRITICAL CARE:       PROCEDURES:    Procedures      DATA FOR LAB AND RADIOLOGY TESTS ORDERED BELOW ARE REVIEWED BY THE ED CLINICIAN:    RADIOLOGY: All x-rays, CT, MRI, and formal ultrasound images (except ED bedside ultrasound) are read by the radiologist, see reports below, unless otherwise noted in MDM or here.  Reports below are reviewed by myself.  CT CERVICAL SPINE WO CONTRAST   Final Result   No acute cervical spine fracture or traumatic malalignment.         CT HEAD WO CONTRAST   Final Result   No CT evidence of acute intracranial hemorrhage or territorial infarct.      Remote bilateral parietal lobe infarcts.         CT CHEST ABDOMEN PELVIS W CONTRAST Additional Contrast? None   Final Result   1.  No evidence of acute traumatic abnormality involving the chest, abdomen,   or pelvis.      2.  There is severe pancreatic ductal dilation and parenchymal atrophy with   extensive pancreatic calcifications compatible with chronic pancreatitis.   Given concurrent mild intrahepatic and extrahepatic biliary ductal dilation,   a central obstructing mass should be excluded.      3.  Moderate hiatal hernia.         XR CHEST PORTABLE   Final Result   No acute abnormality.             LABS: Lab orders shown below, the results are reviewed by myself, and all abnormals are listed below.  Labs Reviewed   APTT - Abnormal; Notable for the following components:       Result Value    APTT 39.3 (*)     All other components within normal limits   PROTIME-INR - Abnormal; Notable for the following components:    Protime 15.7 (*)     All

## 2024-03-03 NOTE — ED TRIAGE NOTES
Pt to ED for evaluation of injuries s/p fall yesterday. Pt reports she was knocked down by her dogs yesterday around 1300. Pt c/o upper bilateral posterior shoulder/back pain, neck pain with certain movements, and upper abdominal pain. Pt reports she did hit her head, denies LOC. Pt reports she takes blood thinners. Pt alert and oriented X4, to ER per self.

## 2024-03-04 LAB
EKG ATRIAL RATE: 86 BPM
EKG P AXIS: 50 DEGREES
EKG P-R INTERVAL: 158 MS
EKG Q-T INTERVAL: 392 MS
EKG QRS DURATION: 86 MS
EKG QTC CALCULATION (BAZETT): 469 MS
EKG R AXIS: -13 DEGREES
EKG T AXIS: 69 DEGREES
EKG VENTRICULAR RATE: 86 BPM

## 2025-02-15 ENCOUNTER — HOSPITAL ENCOUNTER (EMERGENCY)
Age: 70
Discharge: HOME | End: 2025-02-15
Attending: EMERGENCY MEDICINE
Payer: MEDICARE

## 2025-02-15 ENCOUNTER — APPOINTMENT (OUTPATIENT)
Dept: GENERAL RADIOLOGY | Age: 70
End: 2025-02-15
Payer: MEDICARE

## 2025-02-15 VITALS
DIASTOLIC BLOOD PRESSURE: 85 MMHG | RESPIRATION RATE: 18 BRPM | HEART RATE: 78 BPM | OXYGEN SATURATION: 98 % | SYSTOLIC BLOOD PRESSURE: 128 MMHG | TEMPERATURE: 97.7 F

## 2025-02-15 DIAGNOSIS — S80.02XA CONTUSION OF LEFT KNEE, INITIAL ENCOUNTER: Primary | ICD-10-CM

## 2025-02-15 DIAGNOSIS — S70.02XA CONTUSION OF LEFT HIP, INITIAL ENCOUNTER: ICD-10-CM

## 2025-02-15 PROCEDURE — 6370000000 HC RX 637 (ALT 250 FOR IP): Performed by: EMERGENCY MEDICINE

## 2025-02-15 PROCEDURE — 73502 X-RAY EXAM HIP UNI 2-3 VIEWS: CPT

## 2025-02-15 PROCEDURE — 99283 EMERGENCY DEPT VISIT LOW MDM: CPT

## 2025-02-15 PROCEDURE — 73552 X-RAY EXAM OF FEMUR 2/>: CPT

## 2025-02-15 PROCEDURE — 73562 X-RAY EXAM OF KNEE 3: CPT

## 2025-02-15 RX ORDER — OXYCODONE AND ACETAMINOPHEN 5; 325 MG/1; MG/1
1 TABLET ORAL EVERY 6 HOURS PRN
Qty: 12 TABLET | Refills: 0 | Status: SHIPPED | OUTPATIENT
Start: 2025-02-15 | End: 2025-02-18

## 2025-02-15 RX ORDER — ACETAMINOPHEN 325 MG/1
650 TABLET ORAL ONCE
Status: COMPLETED | OUTPATIENT
Start: 2025-02-15 | End: 2025-02-15

## 2025-02-15 RX ADMIN — ACETAMINOPHEN 650 MG: 325 TABLET ORAL at 15:05

## 2025-02-15 ASSESSMENT — ENCOUNTER SYMPTOMS
EYE DISCHARGE: 0
FACIAL SWELLING: 0
EYE PAIN: 0
ABDOMINAL PAIN: 0
CHEST TIGHTNESS: 0
SHORTNESS OF BREATH: 0
ABDOMINAL DISTENTION: 0
BACK PAIN: 0

## 2025-02-15 ASSESSMENT — PAIN SCALES - GENERAL
PAINLEVEL_OUTOF10: 10
PAINLEVEL_OUTOF10: 10

## 2025-02-15 ASSESSMENT — PAIN - FUNCTIONAL ASSESSMENT: PAIN_FUNCTIONAL_ASSESSMENT: 0-10

## 2025-02-15 NOTE — ED PROVIDER NOTES
EMERGENCY DEPARTMENT ENCOUNTER    Pt Name: Kerri Ramos  MRN: [id number]  Birthdate 1955  Date of evaluation: 2/15/25  CHIEF COMPLAINT       Chief Complaint   Patient presents with    Fall     Pt states she fell down four steps.Pt denies LOC or hitting head. Pt states hx of osteoporosis.      HISTORY OF PRESENT ILLNESS   HPI   The patient is a 69-year-old female who presented to the emergency department from home secondary to fall.  Patient fell down 4 steps taking her dog out landing on her left knee and hip she not hit her head or any loss of conscious.  Since then she has had increased pain in her left knee and hip.  She is able to ambulate but limps.  She does not use a wheelchair or cane.  No previous history of injury or surgical intervention to her left knee or hip.  Patient rates the pain 10 out of 10 on pain scale.  She took Tylenol earlier today.  Patient had chest pain, shortness of breath, nausea, vomiting, fevers or chills      REVIEW OF SYSTEMS     Review of Systems   Constitutional:  Negative for chills, diaphoresis and fever.   HENT:  Negative for congestion, ear pain and facial swelling.    Eyes:  Negative for pain, discharge and visual disturbance.   Respiratory:  Negative for chest tightness and shortness of breath.    Cardiovascular:  Negative for chest pain and palpitations.   Gastrointestinal:  Negative for abdominal distention and abdominal pain.   Genitourinary:  Negative for difficulty urinating and flank pain.   Musculoskeletal:  Negative for back pain.        Left knee pain, left hip pain   Skin:  Negative for wound.   Neurological:  Negative for dizziness, light-headedness and headaches.     PASTMEDICAL HISTORY     Past Medical History:   Diagnosis Date    Breast cancer (HCC)     Depression     HTN (hypertension)     Hyperlipidemia     Ovarian cyst, bilateral     Pancreatitis     Renal stone      Past Problem List  Patient Active Problem List   Diagnosis Code    Ovarian cyst,

## (undated) PROCEDURE — 4A03X5D MEASUREMENT OF ARTERIAL FLOW, INTRACRANIAL, EXTERNAL APPROACH: ICD-10-PCS